# Patient Record
Sex: MALE | Race: WHITE | Employment: FULL TIME | URBAN - METROPOLITAN AREA
[De-identification: names, ages, dates, MRNs, and addresses within clinical notes are randomized per-mention and may not be internally consistent; named-entity substitution may affect disease eponyms.]

---

## 2021-05-24 ENCOUNTER — APPOINTMENT (OUTPATIENT)
Dept: GENERAL RADIOLOGY | Age: 56
End: 2021-05-24
Attending: EMERGENCY MEDICINE
Payer: COMMERCIAL

## 2021-05-24 ENCOUNTER — APPOINTMENT (OUTPATIENT)
Dept: NON INVASIVE DIAGNOSTICS | Age: 56
End: 2021-05-24
Attending: FAMILY MEDICINE
Payer: COMMERCIAL

## 2021-05-24 ENCOUNTER — HOSPITAL ENCOUNTER (OUTPATIENT)
Age: 56
Setting detail: OBSERVATION
Discharge: HOME | End: 2021-05-25
Attending: EMERGENCY MEDICINE | Admitting: FAMILY MEDICINE
Payer: COMMERCIAL

## 2021-05-24 DIAGNOSIS — R09.02 HYPOXIA: ICD-10-CM

## 2021-05-24 DIAGNOSIS — I50.21 ACUTE SYSTOLIC (CONGESTIVE) HEART FAILURE (HCC): Primary | ICD-10-CM

## 2021-05-24 PROBLEM — I50.9 CHF EXACERBATION (HCC): Status: ACTIVE | Noted: 2021-05-24

## 2021-05-24 LAB
ALBUMIN SERPL-MCNC: 3.2 G/DL (ref 3.5–5)
ALBUMIN/GLOB SERPL: 1 {RATIO} (ref 1.1–2.2)
ALP SERPL-CCNC: 66 U/L (ref 45–117)
ALT SERPL-CCNC: 35 U/L (ref 12–78)
ANION GAP SERPL CALC-SCNC: 3 MMOL/L (ref 5–15)
AST SERPL-CCNC: 28 U/L (ref 15–37)
BASOPHILS # BLD: 0.1 K/UL (ref 0–0.1)
BASOPHILS NFR BLD: 1 % (ref 0–1)
BILIRUB SERPL-MCNC: 0.7 MG/DL (ref 0.2–1)
BNP SERPL-MCNC: 1968 PG/ML
BUN SERPL-MCNC: 18 MG/DL (ref 6–20)
BUN/CREAT SERPL: 21 (ref 12–20)
CALCIUM SERPL-MCNC: 8.1 MG/DL (ref 8.5–10.1)
CHLORIDE SERPL-SCNC: 104 MMOL/L (ref 97–108)
CO2 SERPL-SCNC: 33 MMOL/L (ref 21–32)
COMMENT, HOLDF: NORMAL
CREAT SERPL-MCNC: 0.87 MG/DL (ref 0.7–1.3)
DIFFERENTIAL METHOD BLD: ABNORMAL
ECHO AO ROOT DIAM: 3.18 CM
ECHO AV AREA PEAK VELOCITY: 1.92 CM2
ECHO AV AREA/BSA PEAK VELOCITY: 1 CM2/M2
ECHO AV PEAK GRADIENT: 4.18 MMHG
ECHO AV PEAK VELOCITY: 102.27 CM/S
ECHO EST RA PRESSURE: 3 MMHG
ECHO LA AREA 4C: 15.49 CM2
ECHO LA MAJOR AXIS: 2.5 CM
ECHO LA MINOR AXIS: 1.33 CM
ECHO LA VOL 2C: 28.65 ML (ref 18–58)
ECHO LA VOL 4C: 43.53 ML (ref 18–58)
ECHO LA VOL BP: 37.93 ML (ref 18–58)
ECHO LA VOL/BSA BIPLANE: 20.18 ML/M2 (ref 16–28)
ECHO LA VOLUME INDEX A2C: 15.24 ML/M2 (ref 16–28)
ECHO LA VOLUME INDEX A4C: 23.15 ML/M2 (ref 16–28)
ECHO LV E' LATERAL VELOCITY: 7.74 CENTIMETER/SECOND
ECHO LV E' SEPTAL VELOCITY: 7.17 CENTIMETER/SECOND
ECHO LV INTERNAL DIMENSION DIASTOLIC: 3.75 CM (ref 4.2–5.9)
ECHO LV INTERNAL DIMENSION SYSTOLIC: 2.65 CM
ECHO LV IVSD: 0.95 CM (ref 0.6–1)
ECHO LV MASS 2D: 100.5 G (ref 88–224)
ECHO LV MASS INDEX 2D: 53.5 G/M2 (ref 49–115)
ECHO LV POSTERIOR WALL DIASTOLIC: 0.87 CM (ref 0.6–1)
ECHO LVOT DIAM: 1.76 CM
ECHO LVOT PEAK GRADIENT: 2.6 MMHG
ECHO LVOT PEAK VELOCITY: 80.67 CM/S
ECHO MV A VELOCITY: 64.11 CENTIMETER/SECOND
ECHO MV AREA PHT: 3.46 CM2
ECHO MV E DECELERATION TIME (DT): 219.51 MS
ECHO MV E VELOCITY: 44.68 CENTIMETER/SECOND
ECHO MV PRESSURE HALF TIME (PHT): 63.66 MS
ECHO PV PEAK INSTANTANEOUS GRADIENT SYSTOLIC: 1.35 MMHG
ECHO PV REGURGITANT MAX VELOCITY: 58.12 CM/S
ECHO RIGHT VENTRICULAR SYSTOLIC PRESSURE (RVSP): 31.65 MMHG
ECHO RV INTERNAL DIMENSION: 3.39 CM
ECHO RV TAPSE: 1.56 CM (ref 1.5–2)
ECHO TV REGURGITANT MAX VELOCITY: 267.62 CM/S
ECHO TV REGURGITANT PEAK GRADIENT: 28.65 MMHG
EOSINOPHIL # BLD: 0.3 K/UL (ref 0–0.4)
EOSINOPHIL NFR BLD: 4 % (ref 0–7)
ERYTHROCYTE [DISTWIDTH] IN BLOOD BY AUTOMATED COUNT: 13.5 % (ref 11.5–14.5)
GLOBULIN SER CALC-MCNC: 3.3 G/DL (ref 2–4)
GLUCOSE SERPL-MCNC: 138 MG/DL (ref 65–100)
HCT VFR BLD AUTO: 46.8 % (ref 36.6–50.3)
HGB BLD-MCNC: 15.2 G/DL (ref 12.1–17)
IMM GRANULOCYTES # BLD AUTO: 0 K/UL (ref 0–0.04)
IMM GRANULOCYTES NFR BLD AUTO: 0 % (ref 0–0.5)
LA VOL DISK BP: 35.18 ML (ref 18–58)
LYMPHOCYTES # BLD: 0.9 K/UL (ref 0.8–3.5)
LYMPHOCYTES NFR BLD: 12 % (ref 12–49)
MCH RBC QN AUTO: 30.8 PG (ref 26–34)
MCHC RBC AUTO-ENTMCNC: 32.5 G/DL (ref 30–36.5)
MCV RBC AUTO: 94.9 FL (ref 80–99)
MONOCYTES # BLD: 0.5 K/UL (ref 0–1)
MONOCYTES NFR BLD: 7 % (ref 5–13)
NEUTS SEG # BLD: 5.7 K/UL (ref 1.8–8)
NEUTS SEG NFR BLD: 76 % (ref 32–75)
NRBC # BLD: 0 K/UL (ref 0–0.01)
NRBC BLD-RTO: 0 PER 100 WBC
PLATELET # BLD AUTO: 285 K/UL (ref 150–400)
PMV BLD AUTO: 9.3 FL (ref 8.9–12.9)
POTASSIUM SERPL-SCNC: 4.4 MMOL/L (ref 3.5–5.1)
PROT SERPL-MCNC: 6.5 G/DL (ref 6.4–8.2)
RBC # BLD AUTO: 4.93 M/UL (ref 4.1–5.7)
SAMPLES BEING HELD,HOLD: NORMAL
SODIUM SERPL-SCNC: 140 MMOL/L (ref 136–145)
TROPONIN I SERPL-MCNC: <0.05 NG/ML
TROPONIN I SERPL-MCNC: <0.05 NG/ML
WBC # BLD AUTO: 7.4 K/UL (ref 4.1–11.1)

## 2021-05-24 PROCEDURE — 96376 TX/PRO/DX INJ SAME DRUG ADON: CPT

## 2021-05-24 PROCEDURE — 99222 1ST HOSP IP/OBS MODERATE 55: CPT | Performed by: SPECIALIST

## 2021-05-24 PROCEDURE — 65660000000 HC RM CCU STEPDOWN

## 2021-05-24 PROCEDURE — 85025 COMPLETE CBC W/AUTO DIFF WBC: CPT

## 2021-05-24 PROCEDURE — 99285 EMERGENCY DEPT VISIT HI MDM: CPT

## 2021-05-24 PROCEDURE — 84484 ASSAY OF TROPONIN QUANT: CPT

## 2021-05-24 PROCEDURE — 74011250637 HC RX REV CODE- 250/637: Performed by: EMERGENCY MEDICINE

## 2021-05-24 PROCEDURE — 99218 HC RM OBSERVATION: CPT

## 2021-05-24 PROCEDURE — 83880 ASSAY OF NATRIURETIC PEPTIDE: CPT

## 2021-05-24 PROCEDURE — 74011250636 HC RX REV CODE- 250/636: Performed by: EMERGENCY MEDICINE

## 2021-05-24 PROCEDURE — 71046 X-RAY EXAM CHEST 2 VIEWS: CPT

## 2021-05-24 PROCEDURE — 93306 TTE W/DOPPLER COMPLETE: CPT

## 2021-05-24 PROCEDURE — 74011250636 HC RX REV CODE- 250/636: Performed by: NURSE PRACTITIONER

## 2021-05-24 PROCEDURE — 74011250637 HC RX REV CODE- 250/637: Performed by: FAMILY MEDICINE

## 2021-05-24 PROCEDURE — 80053 COMPREHEN METABOLIC PANEL: CPT

## 2021-05-24 PROCEDURE — 93005 ELECTROCARDIOGRAM TRACING: CPT

## 2021-05-24 PROCEDURE — 96374 THER/PROPH/DIAG INJ IV PUSH: CPT

## 2021-05-24 PROCEDURE — 36415 COLL VENOUS BLD VENIPUNCTURE: CPT

## 2021-05-24 RX ORDER — FUROSEMIDE 10 MG/ML
40 INJECTION INTRAMUSCULAR; INTRAVENOUS 2 TIMES DAILY
Status: DISCONTINUED | OUTPATIENT
Start: 2021-05-24 | End: 2021-05-25 | Stop reason: HOSPADM

## 2021-05-24 RX ORDER — ASPIRIN 325 MG
325 TABLET ORAL
Status: COMPLETED | OUTPATIENT
Start: 2021-05-24 | End: 2021-05-24

## 2021-05-24 RX ORDER — ONDANSETRON 2 MG/ML
4 INJECTION INTRAMUSCULAR; INTRAVENOUS
Status: DISCONTINUED | OUTPATIENT
Start: 2021-05-24 | End: 2021-05-25 | Stop reason: HOSPADM

## 2021-05-24 RX ORDER — LISINOPRIL 5 MG/1
10 TABLET ORAL DAILY
Status: DISCONTINUED | OUTPATIENT
Start: 2021-05-25 | End: 2021-05-25 | Stop reason: HOSPADM

## 2021-05-24 RX ORDER — ACETAMINOPHEN 650 MG/1
650 SUPPOSITORY RECTAL
Status: DISCONTINUED | OUTPATIENT
Start: 2021-05-24 | End: 2021-05-25 | Stop reason: HOSPADM

## 2021-05-24 RX ORDER — CARVEDILOL 6.25 MG/1
25 TABLET ORAL 2 TIMES DAILY WITH MEALS
Status: DISCONTINUED | OUTPATIENT
Start: 2021-05-24 | End: 2021-05-25

## 2021-05-24 RX ORDER — SODIUM CHLORIDE 0.9 % (FLUSH) 0.9 %
5-40 SYRINGE (ML) INJECTION AS NEEDED
Status: DISCONTINUED | OUTPATIENT
Start: 2021-05-24 | End: 2021-05-25 | Stop reason: HOSPADM

## 2021-05-24 RX ORDER — PROMETHAZINE HYDROCHLORIDE 25 MG/1
12.5 TABLET ORAL
Status: DISCONTINUED | OUTPATIENT
Start: 2021-05-24 | End: 2021-05-25 | Stop reason: HOSPADM

## 2021-05-24 RX ORDER — FUROSEMIDE 10 MG/ML
40 INJECTION INTRAMUSCULAR; INTRAVENOUS DAILY
Status: DISCONTINUED | OUTPATIENT
Start: 2021-05-25 | End: 2021-05-24

## 2021-05-24 RX ORDER — FUROSEMIDE 10 MG/ML
40 INJECTION INTRAMUSCULAR; INTRAVENOUS
Status: COMPLETED | OUTPATIENT
Start: 2021-05-24 | End: 2021-05-24

## 2021-05-24 RX ORDER — CARVEDILOL 25 MG/1
25 TABLET ORAL 2 TIMES DAILY WITH MEALS
Status: ON HOLD | COMMUNITY
End: 2021-05-25 | Stop reason: SDUPTHER

## 2021-05-24 RX ORDER — ENOXAPARIN SODIUM 100 MG/ML
40 INJECTION SUBCUTANEOUS DAILY
Status: DISCONTINUED | OUTPATIENT
Start: 2021-05-25 | End: 2021-05-25 | Stop reason: HOSPADM

## 2021-05-24 RX ORDER — ERGOCALCIFEROL 1.25 MG/1
50000 CAPSULE ORAL
Status: DISCONTINUED | OUTPATIENT
Start: 2021-05-27 | End: 2021-05-25 | Stop reason: HOSPADM

## 2021-05-24 RX ORDER — ACETAMINOPHEN 325 MG/1
650 TABLET ORAL
Status: DISCONTINUED | OUTPATIENT
Start: 2021-05-24 | End: 2021-05-25 | Stop reason: HOSPADM

## 2021-05-24 RX ORDER — POLYETHYLENE GLYCOL 3350 17 G/17G
17 POWDER, FOR SOLUTION ORAL DAILY PRN
Status: DISCONTINUED | OUTPATIENT
Start: 2021-05-24 | End: 2021-05-25 | Stop reason: HOSPADM

## 2021-05-24 RX ORDER — LISINOPRIL 10 MG/1
10 TABLET ORAL DAILY
COMMUNITY
End: 2021-05-25

## 2021-05-24 RX ORDER — SODIUM CHLORIDE 0.9 % (FLUSH) 0.9 %
5-40 SYRINGE (ML) INJECTION EVERY 8 HOURS
Status: DISCONTINUED | OUTPATIENT
Start: 2021-05-24 | End: 2021-05-25 | Stop reason: HOSPADM

## 2021-05-24 RX ORDER — ERGOCALCIFEROL 1.25 MG/1
50000 CAPSULE ORAL
COMMUNITY
End: 2021-12-22

## 2021-05-24 RX ADMIN — FUROSEMIDE 40 MG: 10 INJECTION, SOLUTION INTRAMUSCULAR; INTRAVENOUS at 10:14

## 2021-05-24 RX ADMIN — FUROSEMIDE 40 MG: 10 INJECTION, SOLUTION INTRAMUSCULAR; INTRAVENOUS at 16:21

## 2021-05-24 RX ADMIN — Medication 10 ML: at 16:23

## 2021-05-24 RX ADMIN — NITROGLYCERIN 1 INCH: 20 OINTMENT TOPICAL at 09:00

## 2021-05-24 RX ADMIN — CARVEDILOL 25 MG: 6.25 TABLET, FILM COATED ORAL at 16:21

## 2021-05-24 RX ADMIN — ASPIRIN 325 MG ORAL TABLET 325 MG: 325 PILL ORAL at 08:59

## 2021-05-24 NOTE — PROGRESS NOTES
5/24/2021  11:40 AM  CM completed assessment w/ pt in person, CM wore mask and goggles at all times. Charted demographics verified, pt lives w/ spouse and 2 foster sons  Yaya Law (LINDA) [phone] in 2 story home, there are 2 entry steps and 13 interior steps to bed/bath. At baseline pt reports to be ambulatory, iADLS, drives, wife can assist.    Reason for Admission: Emergent for  Acute CHF exacerbation, Hypoxia  Pt is a 51 yo  male who presents to [facility] ED c/o Lt sided chest discomfort, fatigue  and SOB since Mid-April  PMHx: CHF                   RUR Score:    N/A at time of admission, Low Risk of Readmission                 Plan for utilizing home health:    No history, pt is not homebound  DME: None, pt does not have home O2    Rx: [employer], pt uses [address], reports no difficulty affording his medications   PCP: First and Last name:  Antonella Cortes MD     Name of Practice:    Are you a current patient: Yes/No: Yes   Approximate date of last visit: 45 days   Can you participate in a virtual visit with your PCP: YES                    Current Advanced Directive/Advance Care Plan: Full Code  HCDM spouse Yaya Law [phone]  Healthcare Decision Maker:   Click here to complete [employer] including selection of the Healthcare Decision Maker Relationship (ie \"Primary\")                             Transition of Care Plan:                    RUR N/A, pt is Low Risk of Readmission  1. Hospital admission for medical management   2. Cardiology consult  3. CM to follow through for treatment/response  4. ACP planning, pt declined to complete on this admission  5. DC when stable to home  6. Outpatient f/u PCP, cardiology  7. Wife Jada Murillo (C) [phone] to transport    Care Management Interventions  PCP Verified by CM:  Yes Shorty Brar MD, last visit 45 days)  Palliative Care Criteria Met (RRAT>21 & CHF Dx)?: No  Mode of Transport at Discharge: Self (spouse)  Physical Therapy Consult: No  Occupational Therapy Consult: No  Speech Therapy Consult: No  Current Support Network: Lives with Spouse, Own Home (pt lives w/ spouse in pvt residence, at baseline pt is ambulatory, iADLS, drives)  Confirm Follow Up Transport: Self  Discharge Location  Discharge Placement: Home with outpatient services  RUTHIE Marquez

## 2021-05-24 NOTE — PROGRESS NOTES
Records received from [address] [place] [address] Drive   Dr Akiko Chase  Echo 2016 ef 40-45%  Ef improved w GDMT  Echo 2017 EF 55%  Normal nuclear stress test  2017    Brother  from CHF in his 45s   Father AA s/p repair at age 75 yo

## 2021-05-24 NOTE — PROGRESS NOTES
1410 TRANSFER - IN REPORT:    Verbal report received from Royal Vargas RN (name) on Samantha Gaines  being received from ED 11 (unit) for routine progression of care      Report consisted of patients Situation, Background, Assessment and   Recommendations(SBAR). Information from the following report(s) SBAR, Kardex, ED Summary, Intake/Output, MAR, Recent Results and Cardiac Rhythm NSR was reviewed with the receiving nurse. Opportunity for questions and clarification was provided. Assessment completed upon patients arrival to unit and care assumed. 1525 Pt arrived to unit. Dual skin performed with Sarika Bird RN and WDL. Pt refused 2L O2 with O2 saturation of 88% on room air. Pt refused to wear hospital gown and performed CHG bath on own. Pt refused urinal, wants to get up to restroom and use hat.     1900 Bedside shift change report given to Braden Frye RN (oncoming nurse) by Micheline Wyatt RN (offgoing nurse). Report included the following information SBAR, Kardex, ED Summary, Intake/Output, MAR, Recent Results and Cardiac Rhythm NSR. This patient was assisted with Intentional Toileting every 2 hours during this shift. Documentation of ambulation and output reflected on Flowsheet.

## 2021-05-24 NOTE — CONSULTS
[facility] [address] [phone]  Mobile [phone]    Cardiology Consult Note    Date of  Admission: 2021  8:31 AM  PCP- MD Andrew Saucedo MD  :  1965   MRN:  [id number]       Reason for consult: CHF  Admission Diagnosis: CHF exacerbation ([facility] [I50.9]    Amber France is a 50 y.o. male admitted for CHF exacerbation ([facility] [I50.9]. Consult requested by Sharmaine Closs, MD       Subjective:   CHF exacerbation [facility]) [I50.9]        Impression Plan/Recommendation   1. Acute CHF  2. Pulmonary edema on xray   3. Hypertension   4. Hx of cardiomyopathy               · PTA ACE/coreg  · Echo, reported echo   · Agree w IV lasix, needs aggressive diuresis    · Records from [facility]  · D/c nitro paste  · Can increase lisinopril if needed   · Serial cardiac enzymes, 1st trop is negative/ ecg NSR non ischemic                Subjective:  Amber France is a 50 y.o. male I am seeing for CHF. Pmhx includes cardiomyopathy and  hypertension. Reports he has not been feeling well, \"wonky\" for the last month. C/o HERRERA/SOB, LE edema and fatigue. Denies chest pain, syncope, orthopnea. His brother had a idiopathic CM- he has been tested by a cardiologist is [facility] and he does not have this. EF 55% (2018), and reported negative stress test in 2016. He has been on a bb and acei for prophylaxis. Family hx: brother - idiopathic cardiomyopathy/CHF-  at age 45%. Social hx: denies tobacco or ETOH. No past medical history on file. No past surgical history on file.      Hospital Medications:  Current Facility-Administered Medications   Medication Dose Route Frequency    sodium chloride (NS) flush 5-40 mL  5-40 mL IntraVENous Q8H    sodium chloride (NS) flush 5-40 mL  5-40 mL IntraVENous PRN    acetaminophen (TYLENOL) tablet 650 mg  650 mg Oral Q6H PRN    Or    acetaminophen (TYLENOL) suppository 650 mg  650 mg Rectal Q6H PRN    polyethylene glycol (MIRALAX) packet 17 g  17 g Oral DAILY PRN    promethazine (PHENERGAN) tablet 12.5 mg  12.5 mg Oral Q6H PRN    Or    ondansetron (ZOFRAN) injection 4 mg  4 mg IntraVENous Q6H PRN    [START ON 5/25/2021] enoxaparin (LOVENOX) injection 40 mg  40 mg SubCUTAneous DAILY    [START ON 5/25/2021] furosemide (LASIX) injection 40 mg  40 mg IntraVENous DAILY    carvediloL (COREG) tablet 25 mg  25 mg Oral BID WITH MEALS    ergocalciferol capsule 50,000 Units  50,000 Units Oral Q7D    [START ON 5/25/2021] lisinopriL (PRINIVIL, ZESTRIL) tablet 10 mg  10 mg Oral DAILY     Current Outpatient Medications   Medication Sig    carvediloL (Coreg) 25 mg tablet Take 25 mg by mouth two (2) times daily (with meals).  lisinopriL (PRINIVIL, ZESTRIL) 10 mg tablet Take 10 mg by mouth daily.  ergocalciferol (ERGOCALCIFEROL) 1,250 mcg (50,000 unit) capsule Take 50,000 Units by mouth every seven (7) days. No current facility-administered medications on file prior to encounter. Current Outpatient Medications on File Prior to Encounter   Medication Sig Dispense Refill    carvediloL (Coreg) 25 mg tablet Take 25 mg by mouth two (2) times daily (with meals).  lisinopriL (PRINIVIL, ZESTRIL) 10 mg tablet Take 10 mg by mouth daily.  ergocalciferol (ERGOCALCIFEROL) 1,250 mcg (50,000 unit) capsule Take 50,000 Units by mouth every seven (7) days. No Known Allergies          Review of Symptoms:  All other systems reviewed are negative except as above. Physical Exam    Visit Vitals  BP (!) 144/113   Pulse 87   Temp 97.9 °F (36.6 °C)   Resp 26   Ht 5' 6\" (1.676 m)   Wt 173 lb 8 oz (78.7 kg)   SpO2 93%   BMI 28.00 kg/m²     General Appearance:  Well developed, well nourished,alert and oriented x 3, and individual in no acute distress.    Ears/Nose/Mouth/Throat:   Hearing grossly normal.Normal oral mucosa,no scleral icterus     Neck: Supple + JVD Chest:   Lungs -uday diminished/distant BS in bases   Cardiovascular:  Regular rate and rhythm, 2/6 Murmur. Abdomen:   Rounded/firm. Extremities: +1 edema bilaterally. Pulses detected, no varicosities   Skin: Warm and dry. No bruising  Neuro  Moves all extermities and neurologically intact                                                       Cardiographics    Telemetry: NSR  ECG: NSR    Cardiac testing      Recent Labs     05/24/21  0855   TROIQ <0.05       Recent Labs     05/24/21  0855      K 4.4   CO2 33*   BUN 18   CREA 0.87   *   WBC 7.4   HGB 15.2   HCT 46.8          I have discussed the diagnosis with the patient and the intended plan as seen in the above orders. Questions were answered concerning future plans. I have discussed medication side effects and warnings with the patient as well. Ramon Handley is in agreement to the plan listed above and wishes to proceed. he  was instructed not to smoke, eat heart healthy diet  and to exercise. Thank you for this consult.     Abebe Gould NP

## 2021-05-24 NOTE — H&P
[address] Adult  Hospitalist Group    History & Physical    Date of service: 2021    Patient name: Gael Castro  MRN: [id number]  YOB: 1965  Age: 50 y.o. Primary care provider:  Antonella Cortes MD     Source of Information: patient, medical records                                Chief complain: Shortness of breath    History of present illness  Gael Castro is a 50 y.o. male who presented with left sided chest discomfort since this morning and generally not feeling well. The chest pain is more pressure like and does not radiate. He has no other complaints. He has been having mild SOB on exertion for the last month or so, which he seems to believe is due to dehydration. He had an echo done in [facility] several years ago and reports and EF of 55% and had a negative stress test around that time as well. His brother  suddenly of idiopathic cardiomyopathy. He seems fed up with giving multiple providers the same story, states he doesn't like going to doctors or hospitals. No past medical history on file. No past surgical history on file. Prior to Admission medications    Medication Sig Start Date End Date Taking? Authorizing Provider   carvediloL (Coreg) 25 mg tablet Take 25 mg by mouth two (2) times daily (with meals). Yes Provider, Historical   lisinopriL (PRINIVIL, ZESTRIL) 10 mg tablet Take 10 mg by mouth daily. Yes Provider, Historical   ergocalciferol (ERGOCALCIFEROL) 1,250 mcg (50,000 unit) capsule Take 50,000 Units by mouth every seven (7) days. Yes Provider, Historical     No Known Allergies   No family history on file. Social history    Social History     Tobacco Use   Smoking Status Not on file       Social History     Substance and Sexual Activity   Alcohol Use Not on file       Code status  Code status discussed with the patient/caregivers.   Full Code    Review of systems    A comprehensive review of systems was negative except for that written in the History of Present Illness. Physical Examination   Visit Vitals  BP (!) 144/113   Pulse 87   Temp 97.9 °F (36.6 °C)   Resp 26   Ht 5' 6\" (1.676 m)   Wt 78.7 kg (173 lb 8 oz)   SpO2 93%   BMI 28.00 kg/m²      O2 Flow Rate (L/min): 2 l/min   O2 Device: None (Room air)    General:  Alert, cooperative, no distress   Head:  Normocephalic, without obvious abnormality, atraumatic   Eyes:  Conjunctivae/corneas clear. PERRL, EOMs intact   Head/Neck: Nares normal. No nasal drainage or sinus tenderness  Lips, mucosa, and tongue normal   Teeth and gums normal  Clear oropharynx  Trachea midline  No palpable adenopathy  No thyroid enlargement, tenderness     Lungs:   Symmetrical chest expansion and respiratory effort  Clear to auscultation bilaterally       Heart:  Regular rhythm   Sounds normal; no murmur, rub or gallop   Abdomen:   Soft, no tenderness  Bowel sounds normal  No masses or hepatosplenomegaly     Back: No CVA tenderness   Extremities: Extremities normal, atraumatic  No cyanosis or edema     Skin: No rashes or ulcers   Musculo-      skeletal: Gait not tested  Normal symmetry, ROM, strength and tone   Neuro: Cranial nerves grossly normal     Psych: Alert, oriented x3  Normal affect, judgement and insight     Data Review    24 Hour Results:  Recent Results (from the past 24 hour(s))   CBC WITH AUTOMATED DIFF    Collection Time: 05/24/21  8:55 AM   Result Value Ref Range    WBC 7.4 4.1 - 11.1 K/uL    RBC 4.93 4.10 - 5.70 M/uL    HGB 15.2 12.1 - 17.0 g/dL    HCT 46.8 36.6 - 50.3 %    MCV 94.9 80.0 - 99.0 FL    MCH 30.8 26.0 - 34.0 PG    MCHC 32.5 30.0 - 36.5 g/dL    RDW 13.5 11.5 - 14.5 %    PLATELET 424 574 - 344 K/uL    MPV 9.3 8.9 - 12.9 FL    NRBC 0.0 0  WBC    ABSOLUTE NRBC 0.00 0.00 - 0.01 K/uL    NEUTROPHILS 76 (H) 32 - 75 %    LYMPHOCYTES 12 12 - 49 %    MONOCYTES 7 5 - 13 %    EOSINOPHILS 4 0 - 7 %    BASOPHILS 1 0 - 1 %    IMMATURE GRANULOCYTES 0 0.0 - 0.5 %    ABS.  NEUTROPHILS 5.7 1.8 - 8.0 K/UL    ABS. LYMPHOCYTES 0.9 0.8 - 3.5 K/UL    ABS. MONOCYTES 0.5 0.0 - 1.0 K/UL    ABS. EOSINOPHILS 0.3 0.0 - 0.4 K/UL    ABS. BASOPHILS 0.1 0.0 - 0.1 K/UL    ABS. IMM. GRANS. 0.0 0.00 - 0.04 K/UL    DF AUTOMATED     METABOLIC PANEL, COMPREHENSIVE    Collection Time: 05/24/21  8:55 AM   Result Value Ref Range    Sodium 140 136 - 145 mmol/L    Potassium 4.4 3.5 - 5.1 mmol/L    Chloride 104 97 - 108 mmol/L    CO2 33 (H) 21 - 32 mmol/L    Anion gap 3 (L) 5 - 15 mmol/L    Glucose 138 (H) 65 - 100 mg/dL    BUN 18 6 - 20 MG/DL    Creatinine 0.87 0.70 - 1.30 MG/DL    BUN/Creatinine ratio 21 (H) 12 - 20      GFR est AA >60 >60 ml/min/1.73m2    GFR est non-AA >60 >60 ml/min/1.73m2    Calcium 8.1 (L) 8.5 - 10.1 MG/DL    Bilirubin, total 0.7 0.2 - 1.0 MG/DL    ALT (SGPT) 35 12 - 78 U/L    AST (SGOT) 28 15 - 37 U/L    Alk. phosphatase 66 45 - 117 U/L    Protein, total 6.5 6.4 - 8.2 g/dL    Albumin 3.2 (L) 3.5 - 5.0 g/dL    Globulin 3.3 2.0 - 4.0 g/dL    A-G Ratio 1.0 (L) 1.1 - 2.2     TROPONIN I    Collection Time: 05/24/21  8:55 AM   Result Value Ref Range    Troponin-I, Qt. <0.05 <0.05 ng/mL   NT-PRO BNP    Collection Time: 05/24/21  8:55 AM   Result Value Ref Range    NT pro-BNP 1,968 (H) <125 PG/ML   SAMPLES BEING HELD    Collection Time: 05/24/21  8:55 AM   Result Value Ref Range    SAMPLES BEING HELD 1RED,1SST     COMMENT        Add-on orders for these samples will be processed based on acceptable specimen integrity and analyte stability, which may vary by analyte. Recent Labs     05/24/21  0855   WBC 7.4   HGB 15.2   HCT 46.8        Recent Labs     05/24/21  0855      K 4.4      CO2 33*   *   BUN 18   CREA 0.87   CA 8.1*   ALB 3.2*   TBILI 0.7   ALT 35       Imaging  XR CHEST PA LAT    Result Date: 5/24/2021  Exam:  2 view chest Indication: Chest pain PA and lateral views demonstrate normal heart size.  There are small bilateral pleural effusions with underlying atelectasis and mild edema. Dextroconvex scoliosis of the thoracic spine is noted with stabilization tania. Small bilateral pleural effusions with underlying atelectasis and mild edema. Assessment and Plan     Acute on chronic CHF exacerbation  -Received a dose of Lasix in the ED  -Mildly hypoxic, requiring 2 L but patient refusing to keep it on  -Check echo and continue daily furosemide, ACE, beta-blocker  -Consult cardiology    Acute hypoxic respiratory failure  -Due to above  -Wean as tolerated to maintain sats above 90. Chest pain  -Trend enzymes  -Cardiology consulted as above    Hypertension  -Continue ACE and beta-blocker    Diet: Cardiac  Activity: As tolerated  DVT prophylaxis: Lovenox  Isolation precautions: None       Signed by:  oJdy Cook MD    May 24, 2021 at 11:08 AM

## 2021-05-24 NOTE — ED NOTES
TRANSFER - OUT REPORT:    Verbal report given to ZACKARY Roca(name) on Sotero Castro  being transferred to 3rd floor(unit) for routine progression of care       Report consisted of patients Situation, Background, Assessment and   Recommendations(SBAR). Information from the following report(s) SBAR, ED Summary, Intake/Output, MAR, Recent Results and Cardiac Rhythm NSR was reviewed with the receiving nurse. Lines:   Peripheral IV 05/24/21 Right Wrist (Active)   Site Assessment Clean, dry, & intact 05/24/21 0853   Phlebitis Assessment 0 05/24/21 0853   Infiltration Assessment 0 05/24/21 0853   Dressing Status Clean, dry, & intact 05/24/21 0853   Dressing Type Transparent 05/24/21 0853   Hub Color/Line Status Pink;Flushed 05/24/21 0853   Action Taken Blood drawn 05/24/21 0362        Opportunity for questions and clarification was provided.       Patient transported with:   Monitor  Registered Nurse   Visit Vitals  BP (!) 121/94   Pulse 92   Temp 98.2 °F (36.8 °C)   Resp 23   Ht 5' 6\" (1.676 m)   Wt 78.5 kg (173 lb)   SpO2 95%   BMI 27.92 kg/m²

## 2021-05-24 NOTE — ED PROVIDER NOTES
Mr. Silvio Ratliff is a 52yo male who presents to the ER with complaints \"I want a feeling in my chest.\"  He said that the symptoms started when he got up at 6:00 this morning. His symptoms have been present but have been improving the morning. He describes it as a tightness in left side of his chest.  He said that it radiated minimally to his right lateral chest.  No radiation to his neck or his arms. He said that he has a history of baseline shortness of breath. He was diagnosed with a cardiomyopathy. He said that his last EF was 55%. He said that over the last month or so, he is getting more short of breath when he exerts himself. He has spoken to his primary care doctor about this and he has seen GI. He denies any lightheadedness or dizziness today. No nausea or vomiting. He denies any other complaints. No past medical history on file. No past surgical history on file. No family history on file. Social History     Socioeconomic History    Marital status: Not on file     Spouse name: Not on file    Number of children: Not on file    Years of education: Not on file    Highest education level: Not on file   Occupational History    Not on file   Tobacco Use    Smoking status: Not on file   Substance and Sexual Activity    Alcohol use: Not on file    Drug use: Not on file    Sexual activity: Not on file   Other Topics Concern    Not on file   Social History Narrative    Not on file     Social Determinants of Health     Financial Resource Strain:     Difficulty of Paying Living Expenses:    Food Insecurity:     Worried About Running Out of Food in the Last Year:     [address] in the Last Year:    Transportation Needs:     Lack of Transportation (Medical):      Lack of Transportation (Non-Medical):    Physical Activity:     Days of Exercise per Week:     Minutes of Exercise per Session:    Stress:     Feeling of Stress :    Social Connections:     Frequency of Communication with Friends and Family:     Frequency of Social Gatherings with Friends and Family:     Attends Religion Services:     Active Member of Clubs or Organizations:     Attends Club or Organization Meetings:     Marital Status:    Intimate Partner Violence:     Fear of Current or Ex-Partner:     Emotionally Abused:     Physically Abused:     Sexually Abused: ALLERGIES: Patient has no known allergies. Review of Systems   Constitutional: Negative for chills and fever. HENT: Negative for rhinorrhea and sore throat. Respiratory: Positive for chest tightness. Negative for cough and shortness of breath. Cardiovascular: Negative for chest pain. Gastrointestinal: Negative for abdominal pain, diarrhea, nausea and vomiting. Genitourinary: Negative for dysuria and hematuria. Musculoskeletal: Negative for arthralgias and myalgias. Skin: Negative for pallor and rash. Neurological: Negative for dizziness, weakness and light-headedness. All other systems reviewed and are negative. Vitals:    05/24/21 0826 05/24/21 0847   BP: (!) 143/106    Pulse: 88    Resp: 18    Temp: 97.9 °F (36.6 °C)    SpO2: 93% 93%   Weight: 78.7 kg (173 lb 8 oz)    Height: 5' 6\" (1.676 m)             Physical Exam     Vital signs reviewed. Nursing notes reviewed.     Const:  No acute distress, well developed, well nourished  Head:  Atraumatic, normocephalic  Eyes:  PERRL, conjunctiva normal, no scleral icterus  Neck:  Supple, trachea midline  Cardiovascular: Regular rate  Resp:  No resp distress, no increased work of breathing  Abd:  Soft, non-tender, non-distended  MSK:  No pedal edema, normal ROM  Neuro:  Alert and oriented x3, no cranial nerve defect  Skin:  Warm, dry, intact  Psych: normal mood and affect, behavior is normal, judgement and thought content is normal          MDM  Number of Diagnoses or Management Options     Amount and/or Complexity of Data Reviewed  Clinical lab tests: ordered and reviewed  Tests in the radiology section of CPT®: ordered and reviewed  Review and summarize past medical records: yes    Patient Progress  Patient progress: stable          Total critical care time spent exclusive of procedures:  35 min. Perfect Serve Consult for Admission  10:15 AM    ED Room Number: NK31/63  Patient Name and age:  Ghada Lab 50 y.o.  male  Working Diagnosis:   1. Acute systolic (congestive) heart failure ([address]    2. Hypoxia        COVID-19 Suspicion:  no  Sepsis present:  no  Reassessment needed: no  Code Status:  Full Code  Readmission: no  Isolation Requirements:  no  Recommended Level of Care:  telemetry  Department:[facility] ED - [phone]      Mr. Alyssia Tovar is a 52yo male who presents to the ER with chest pain and HERRERA. He states that his SOB has been worsening over the last few weeks. Xray shows edema and effusions. He is hypoxic in the ER, and he was placed on 2L NC. Pt.  To be evaluated for admission by the hospitalist.        Procedures

## 2021-05-24 NOTE — ED NOTES
Patient is asleep. O2 saturation dropped to 89%. Patient is placed on 2L of O2 via nasal canula. Dr. Abad Many notified.

## 2021-05-24 NOTE — ED TRIAGE NOTES
Patient arrives with complaint of left sided chest \"discomfort\", fatigue, intermittent SOB. States chest discomfort began this morning. Fatigue and SOB present since mid-April. Denies cough, fever, N/V/D. Hx of cardiomyopathy.

## 2021-05-24 NOTE — PROGRESS NOTES
[facility]: MED RECONCILIATION      Information obtained from: Patient in ER 11      Allergies: Patient has no known allergies. Prior to Admission Medications:     Medication Documentation Review Audit       Reviewed by SAVAGE FreyD (Pharmacist) on 05/24/21 at 1344      Medication Sig Documenting Provider Last Dose Status Taking?   carvediloL (Coreg) 25 mg tablet Take 25 mg by mouth two (2) times daily (with meals). Provider, Historical 5/24/2021 AM Active Yes   ergocalciferol (ERGOCALCIFEROL) 1,250 mcg (50,000 unit) capsule Take 50,000 Units by mouth every seven (7) days. Provider, Historical 5/20/2021 Active Yes   lisinopriL (PRINIVIL, ZESTRIL) 10 mg tablet Take 10 mg by mouth daily.  Provider, Historical 5/24/2021 AM Active Yes                      [facility], PHARMD   Contact: [phone]

## 2021-05-24 NOTE — PROGRESS NOTES
1900 Shift change:     Bedside and Verbal shift change report given to [address] [place] [address] (oncoming nurse) by Tushar Chaidez (offgoing nurse). Report included the following information SBAR, Kardex, Intake/Output, MAR, and Recent Results. Shift Summary: This patient was assisted with Intentional Toileting every 2 hours during this shift. Documentation of ambulation and output reflected on Flowsheet. 0730 Shift change:     Bedside and Verbal shift change report given to Emily Figueroa (oncoming nurse) by [address] [place] [address] (offgoing nurse). Report included the following information SBAR, Kardex, Intake/Output, MAR, and Recent Results.

## 2021-05-25 ENCOUNTER — APPOINTMENT (OUTPATIENT)
Dept: GENERAL RADIOLOGY | Age: 56
End: 2021-05-25
Attending: FAMILY MEDICINE
Payer: COMMERCIAL

## 2021-05-25 ENCOUNTER — APPOINTMENT (OUTPATIENT)
Dept: ULTRASOUND IMAGING | Age: 56
End: 2021-05-25
Attending: NURSE PRACTITIONER
Payer: COMMERCIAL

## 2021-05-25 VITALS
OXYGEN SATURATION: 93 % | HEART RATE: 99 BPM | DIASTOLIC BLOOD PRESSURE: 76 MMHG | RESPIRATION RATE: 18 BRPM | SYSTOLIC BLOOD PRESSURE: 103 MMHG | WEIGHT: 173 LBS | TEMPERATURE: 97.9 F | BODY MASS INDEX: 27.8 KG/M2 | HEIGHT: 66 IN

## 2021-05-25 LAB
ANION GAP SERPL CALC-SCNC: 4 MMOL/L (ref 5–15)
ATRIAL RATE: 82 BPM
BNP SERPL-MCNC: 1393 PG/ML
BUN SERPL-MCNC: 23 MG/DL (ref 6–20)
BUN/CREAT SERPL: 20 (ref 12–20)
CALCIUM SERPL-MCNC: 7.8 MG/DL (ref 8.5–10.1)
CALCULATED P AXIS, ECG09: 39 DEGREES
CALCULATED R AXIS, ECG10: 82 DEGREES
CALCULATED T AXIS, ECG11: 24 DEGREES
CHLORIDE SERPL-SCNC: 96 MMOL/L (ref 97–108)
CO2 SERPL-SCNC: 39 MMOL/L (ref 21–32)
CREAT SERPL-MCNC: 1.14 MG/DL (ref 0.7–1.3)
DIAGNOSIS, 93000: NORMAL
ERYTHROCYTE [DISTWIDTH] IN BLOOD BY AUTOMATED COUNT: 13.3 % (ref 11.5–14.5)
GLUCOSE SERPL-MCNC: 104 MG/DL (ref 65–100)
HCT VFR BLD AUTO: 48.2 % (ref 36.6–50.3)
HGB BLD-MCNC: 15.5 G/DL (ref 12.1–17)
MCH RBC QN AUTO: 31.1 PG (ref 26–34)
MCHC RBC AUTO-ENTMCNC: 32.2 G/DL (ref 30–36.5)
MCV RBC AUTO: 96.6 FL (ref 80–99)
NRBC # BLD: 0 K/UL (ref 0–0.01)
NRBC BLD-RTO: 0 PER 100 WBC
P-R INTERVAL, ECG05: 202 MS
PLATELET # BLD AUTO: 280 K/UL (ref 150–400)
PMV BLD AUTO: 9.7 FL (ref 8.9–12.9)
POTASSIUM SERPL-SCNC: 3.6 MMOL/L (ref 3.5–5.1)
Q-T INTERVAL, ECG07: 372 MS
QRS DURATION, ECG06: 80 MS
QTC CALCULATION (BEZET), ECG08: 434 MS
RBC # BLD AUTO: 4.99 M/UL (ref 4.1–5.7)
SODIUM SERPL-SCNC: 139 MMOL/L (ref 136–145)
TROPONIN I SERPL-MCNC: <0.05 NG/ML
VENTRICULAR RATE, ECG03: 82 BPM
WBC # BLD AUTO: 8.8 K/UL (ref 4.1–11.1)

## 2021-05-25 PROCEDURE — 96375 TX/PRO/DX INJ NEW DRUG ADDON: CPT

## 2021-05-25 PROCEDURE — 76705 ECHO EXAM OF ABDOMEN: CPT

## 2021-05-25 PROCEDURE — 71046 X-RAY EXAM CHEST 2 VIEWS: CPT

## 2021-05-25 PROCEDURE — 36415 COLL VENOUS BLD VENIPUNCTURE: CPT

## 2021-05-25 PROCEDURE — 83880 ASSAY OF NATRIURETIC PEPTIDE: CPT

## 2021-05-25 PROCEDURE — 74011250637 HC RX REV CODE- 250/637: Performed by: NURSE PRACTITIONER

## 2021-05-25 PROCEDURE — 74011250636 HC RX REV CODE- 250/636: Performed by: SPECIALIST

## 2021-05-25 PROCEDURE — 99232 SBSQ HOSP IP/OBS MODERATE 35: CPT | Performed by: SPECIALIST

## 2021-05-25 PROCEDURE — 80048 BASIC METABOLIC PNL TOTAL CA: CPT

## 2021-05-25 PROCEDURE — 96372 THER/PROPH/DIAG INJ SC/IM: CPT

## 2021-05-25 PROCEDURE — 99218 HC RM OBSERVATION: CPT

## 2021-05-25 PROCEDURE — 74011250636 HC RX REV CODE- 250/636: Performed by: FAMILY MEDICINE

## 2021-05-25 PROCEDURE — 85027 COMPLETE CBC AUTOMATED: CPT

## 2021-05-25 PROCEDURE — 84484 ASSAY OF TROPONIN QUANT: CPT

## 2021-05-25 RX ORDER — DOBUTAMINE HYDROCHLORIDE 200 MG/100ML
2.5 INJECTION INTRAVENOUS CONTINUOUS
Status: DISCONTINUED | OUTPATIENT
Start: 2021-05-25 | End: 2021-05-25

## 2021-05-25 RX ORDER — CARVEDILOL 3.12 MG/1
25 TABLET ORAL 2 TIMES DAILY WITH MEALS
Qty: 60 TABLET | Refills: 0 | Status: SHIPPED | OUTPATIENT
Start: 2021-05-25 | End: 2021-12-22

## 2021-05-25 RX ORDER — POTASSIUM CHLORIDE 750 MG/1
40 TABLET, FILM COATED, EXTENDED RELEASE ORAL
Status: COMPLETED | OUTPATIENT
Start: 2021-05-25 | End: 2021-05-25

## 2021-05-25 RX ORDER — CARVEDILOL 6.25 MG/1
12.5 TABLET ORAL 2 TIMES DAILY WITH MEALS
Status: DISCONTINUED | OUTPATIENT
Start: 2021-05-25 | End: 2021-05-25 | Stop reason: HOSPADM

## 2021-05-25 RX ADMIN — DOBUTAMINE HYDROCHLORIDE 2.5 MCG/KG/MIN: 200 INJECTION INTRAVENOUS at 05:51

## 2021-05-25 RX ADMIN — POTASSIUM CHLORIDE 40 MEQ: 750 TABLET, EXTENDED RELEASE ORAL at 15:22

## 2021-05-25 RX ADMIN — ENOXAPARIN SODIUM 40 MG: 40 INJECTION SUBCUTANEOUS at 07:54

## 2021-05-25 RX ADMIN — SODIUM CHLORIDE 250 ML: 9 INJECTION, SOLUTION INTRAVENOUS at 07:36

## 2021-05-25 RX ADMIN — Medication 10 ML: at 05:51

## 2021-05-25 RX ADMIN — Medication 10 ML: at 15:22

## 2021-05-25 NOTE — DISCHARGE INSTRUCTIONS
Patient Education          Resume COREG tomorrow morning at low dose- hold on starting if systolic blood pressure (top number) is less than 110. Monitor BP daily and call office for further instructions if blood pressure is greater than 140 consistently      Avoiding Triggers With Heart Failure: Care Instructions  Your Care Instructions     Triggers are anything that make your heart failure flare up. A flare-up is also called \"sudden heart failure\" or \"acute heart failure. \" When you have a flare-up, fluid builds up in your lungs, and you have problems breathing. You might need to go to the hospital. By watching for changes in your condition and avoiding triggers, you can prevent heart failure flare-ups. Follow-up care is a key part of your treatment and safety. Be sure to make and go to all appointments, and call your doctor if you are having problems. It's also a good idea to know your test results and keep a list of the medicines you take. How can you care for yourself at home? Watch for changes in your weight and condition  · Weigh yourself without clothing at the same time each day. Record your weight. Call your doctor if you have sudden weight gain, such as more than 2 to 3 pounds in a day or 5 pounds in a week. (Your doctor may suggest a different range of weight gain.) A sudden weight gain may mean that your heart failure is getting worse. · Keep a daily record of your symptoms. Write down any changes in how you feel, such as new shortness of breath, cough, or problems eating. Also record if your ankles are more swollen than usual and if you feel more tired than usual. Note anything that you ate or did that could have triggered these changes. Limit sodium  Sodium causes your body to hold on to extra water. This may cause your heart failure symptoms to get worse. People get most of their sodium from processed foods. Fast food and restaurant meals also tend to be very high in sodium.   · Your doctor may suggest that you limit sodium. Your doctor can tell you how much sodium is right for you. This includes limiting sodium in cooked and packaged foods. · Read food labels on cans and food packages. They tell you how much sodium you get in one serving. Check the serving size. If you eat more than one serving, you are getting more sodium. · Be aware that sodium can come in forms other than salt, including monosodium glutamate (MSG), sodium citrate, and sodium bicarbonate (baking soda). MSG is often added to Asian food. You can sometimes ask for food without MSG or salt. · Slowly reducing salt will help you adjust to the taste. Take the salt shaker off the table. · Flavor your food with garlic, lemon juice, onion, vinegar, herbs, and spices instead of salt. Do not use soy sauce, steak sauce, onion salt, garlic salt, mustard, or ketchup on your food, unless it is labeled \"low-sodium\" or \"low-salt. \"  · Make your own salad dressings, sauces, and ketchup without adding salt. · Use fresh or frozen ingredients, instead of canned ones, whenever you can. Choose low-sodium canned goods. · Eat less processed food and food from restaurants, including fast food. Exercise as directed  Moderate, regular exercise is very good for your heart. It improves your blood flow and helps control your weight. But too much exercise can stress your heart and cause a heart failure flare-up. · Check with your doctor before you start an exercise program.  · Walking is an easy way to get exercise. Start out slowly. Gradually increase the length and pace of your walk. Swimming, riding a bike, and using a treadmill are also good forms of exercise. · When you exercise, watch for signs that your heart is working too hard. You are pushing yourself too hard if you cannot talk while you are exercising. If you become short of breath or dizzy or have chest pain, stop, sit down, and rest.  · Do not exercise when you do not feel well.   Take medicines correctly  · Take your medicines exactly as prescribed. Call your doctor if you think you are having a problem with your medicine. · Make a list of all the medicines you take. Include those prescribed to you by other doctors and any over-the-counter medicines, vitamins, or supplements you take. Take this list with you when you go to any doctor. · Take your medicines at the same time every day. It may help you to post a list of all the medicines you take every day and what time of day you take them. · Make taking your medicine as simple as you can. Plan times to take your medicines when you are doing other things, such as eating a meal or getting ready for bed. This will make it easier to remember to take your medicines. · Get organized. Use helpful tools, such as daily or weekly pill containers. When should you call for help? Call 911 if you have symptoms of sudden heart failure such as:    · You have severe trouble breathing.     · You cough up pink, foamy mucus.     · You have a new irregular or rapid heartbeat. Call your doctor now or seek immediate medical care if:    · You have new or increased shortness of breath.     · You are dizzy or lightheaded, or you feel like you may faint.     · You have sudden weight gain, such as more than 2 to 3 pounds in a day or 5 pounds in a week. (Your doctor may suggest a different range of weight gain.)     · You have increased swelling in your legs, ankles, or feet.     · You are suddenly so tired or weak that you cannot do your usual activities. Watch closely for changes in your health, and be sure to contact your doctor if you develop new symptoms. Where can you learn more? Go to http://www.gray.com/  Enter V089 in the search box to learn more about \"Avoiding Triggers With Heart Failure: Care Instructions. \"  Current as of: August 31, 2020               Content Version: 12.8  © 2764-4700 [employer], [employer]    Care instructions adapted under license by [employer] (which disclaims liability or warranty for this information). If you have questions about a medical condition or this instruction, always ask your healthcare professional. [address] any warranty or liability for your use of this information. [facility]/ED Visit Follow-Up Instructions/Information    You may have an in home follow up visit set up with [facility] or may wish to contact [facility] to set-up a visit:    What are we? [facility] is an in-home urgent care service staffed with emergency trained medical teams. We come to your home in a vehicle stocked with medical supplies and technology. An ER physician is always available if needed. When? As a part of your hospital follow-up, an appointment has been/ or can be set up for us to come see you. Usually, this will be 24-72 hours after you leave the hospital or as needed. [facility] is open 7am-9pm, 7 days a week, 365 days a year, including holidays. Why? We know that you cannot always get to your doctor after being in the hospital and that your doctor is not always available when you need them. Once your workup is complete, we'll call in your prescriptions, update your family doctor, and handle billing with your insurance so you can focus on feeling better, faster without leaving home. How much? We accept most major health insurance plans, including Medicaid, Medicare, and Medicare Advantage [facility], [employer], and [employer]. We also accept: credit, debit, health savings account (HSA), health reimbursement account (HRA) and flexible spending account (FSA) payments. [facility]'s prices compare to conventional urgent care facilities, but we bring the care to you. How to reach us? Getting care is easy- use our mobile nessa (Inter[employer]), website ([email or web address]) or call us [phone].

## 2021-05-25 NOTE — PROGRESS NOTES
Provided pastoral care visit to [facility] 5 patient. Did not include sacramental care.     Haider Ennis

## 2021-05-25 NOTE — PROGRESS NOTES
Daily Progress Note: 2021  Patricia Lagos MD    Assessment/Plan:   Acute on chronic CHF exacerbation  -Received a dose of Lasix in the ED  -Echo with EF 50%  -Hold furosemide and  ACE due to hypotension   -beta-blocker with parameters  -Consult cardiology     Acute hypoxic respiratory failure  -Due to above  -Wean as tolerated to maintain sats above 90.     Chest pain  -Trend enzymes  -Cardiology consulted as above     Hypertension now hypotensive  -Hold ACE   -beta-blocker with parameters          Problem List:  Problem List as of 2021 Never Reviewed        Codes Class Noted - Resolved    CHF exacerbation ([facility] [address] ICD-10-CM: I50.9  ICD-9-CM: 428.0  2021 - Present              HPI:   Nii Brown is a 50 y.o. male who presented with left sided chest discomfort since this morning and generally not feeling well. The chest pain is more pressure like and does not radiate. He has no other complaints. He has been having mild SOB on exertion for the last month or so, which he seems to believe is due to dehydration. He had an echo done in [facility] several years ago and reports and EF of 55% and had a negative stress test around that time as well. His brother  suddenly of idiopathic cardiomyopathy. He seems fed up with giving multiple providers the same story, states he doesn't like going to doctors or hospitals. (Dr Matty Alexander)    :BP has been low. He is sitting in chair and is asymptomatic. Was on a Dobutamine drip for a short period of time during the night but d/c as his EF was 50% on ECHO. Receiving bolus. Cards following. Review of Systems:   A comprehensive review of systems was negative except for that written in the HPI.     Objective:   Physical Exam:     Visit Vitals  BP (!) 82/60   Pulse 69   Temp 97.5 °F (36.4 °C)   Resp 20   Ht 5' 6\" (1.676 m)   Wt 173 lb (78.5 kg)   SpO2 98%   BMI 27.92 kg/m²    O2 Flow Rate (L/min): 2 l/min O2 Device: Nasal cannula    Temp (24hrs), Av.8 °F (36.6 °C), Min:97.5 °F (36.4 °C), Max:98.2 °F (36.8 °C)    No intake/output data recorded. 1901 -  0700  In: 240 [P.O.:240]  Out: 3000 [Urine:3000]    General:  Alert, cooperative, no distress, appears stated age. Head:  Normocephalic, without obvious abnormality, atraumatic. Eyes:  Conjunctivae/corneas clear. PERRL, EOMs intact. Nose: Nares normal. Septum midline. Mucosa normal. No drainage or sinus tenderness. Throat: Lips, mucosa, and tongue normal. Teeth and gums normal.   Neck: Supple, symmetrical, trachea midline, no adenopathy, thyroid: no enlargement/tenderness/nodules, no carotid bruit and no JVD. Back:   Symmetric, no curvature. ROM normal. No CVA tenderness. Lungs:   Clear to auscultation bilaterally. Chest wall:  No tenderness or deformity. Heart:  Regular rate and rhythm, S1, S2 normal, no murmur, click, rub or gallop. Abdomen:   Soft, non-tender. Bowel sounds normal. No masses,  No organomegaly. Extremities: Lower extremities with 2+ edema. No calf tenderness or cords. Pulses: 2+ and symmetric all extremities. Skin: Skin color, texture, turgor normal. No rashes or lesions   Neurologic: CNII-XII intact. Alert and oriented X 3. Fine motor of hands and fingers normal.   equal.  No cogwheeling or rigidity. Gait not tested at this time. Sensation grossly normal to touch. Gross motor of extremities normal.       Data Review:   CXR 21  IMPRESSION  Small bilateral pleural effusions with underlying atelectasis and  mild edema. Interpretation Summary- ECHO 21    · LV: Calculated LVEF is 50%. Normal cavity size and wall thickness. Low normal systolic function. · RV: Borderline low systolic function. Comparison Study Information    Prior Study    There is no prior study available for comparison   Echo Findings    Left Ventricle Normal cavity size and wall thickness. The calculated EF is 50%. Low normal systolic function.    Left Atrium Normal cavity size. Right Ventricle Normal cavity size. Borderline low systolic function. Right Atrium Normal cavity size. Aortic Valve Trileaflet valve structure and no stenosis. Mitral Valve Normal valve structure and no regurgitation. Tricuspid Valve Normal valve structure. Trace regurgitation. Pulmonic Valve Pulmonic valve not well visualized. Trace regurgitation. Aorta Normal aortic root. Pericardium No evidence of pericardial effusion. Recent Days:  Recent Labs     05/25/21  0339 05/24/21  0855   WBC 8.8 7.4   HGB 15.5 15.2   HCT 48.2 46.8    285     Recent Labs     05/25/21  0339 05/24/21  0855    140   K 3.6 4.4   CL 96* 104   CO2 39* 33*   * 138*   BUN 23* 18   CREA 1.14 0.87   CA 7.8* 8.1*   ALB  --  3.2*   TBILI  --  0.7   ALT  --  35     No results for input(s): PH, PCO2, PO2, HCO3, FIO2 in the last 72 hours. 24 Hour Results:  Recent Results (from the past 24 hour(s))   CBC WITH AUTOMATED DIFF    Collection Time: 05/24/21  8:55 AM   Result Value Ref Range    WBC 7.4 4.1 - 11.1 K/uL    RBC 4.93 4.10 - 5.70 M/uL    HGB 15.2 12.1 - 17.0 g/dL    HCT 46.8 36.6 - 50.3 %    MCV 94.9 80.0 - 99.0 FL    MCH 30.8 26.0 - 34.0 PG    MCHC 32.5 30.0 - 36.5 g/dL    RDW 13.5 11.5 - 14.5 %    PLATELET 823 368 - 109 K/uL    MPV 9.3 8.9 - 12.9 FL    NRBC 0.0 0  WBC    ABSOLUTE NRBC 0.00 0.00 - 0.01 K/uL    NEUTROPHILS 76 (H) 32 - 75 %    LYMPHOCYTES 12 12 - 49 %    MONOCYTES 7 5 - 13 %    EOSINOPHILS 4 0 - 7 %    BASOPHILS 1 0 - 1 %    IMMATURE GRANULOCYTES 0 0.0 - 0.5 %    ABS. NEUTROPHILS 5.7 1.8 - 8.0 K/UL    ABS. LYMPHOCYTES 0.9 0.8 - 3.5 K/UL    ABS. MONOCYTES 0.5 0.0 - 1.0 K/UL    ABS. EOSINOPHILS 0.3 0.0 - 0.4 K/UL    ABS. BASOPHILS 0.1 0.0 - 0.1 K/UL    ABS. IMM.  GRANS. 0.0 0.00 - 0.04 K/UL    DF AUTOMATED     METABOLIC PANEL, COMPREHENSIVE    Collection Time: 05/24/21  8:55 AM   Result Value Ref Range    Sodium 140 136 - 145 mmol/L    Potassium 4.4 3.5 - 5.1 mmol/L    Chloride 104 97 - 108 mmol/L    CO2 33 (H) 21 - 32 mmol/L    Anion gap 3 (L) 5 - 15 mmol/L    Glucose 138 (H) 65 - 100 mg/dL    BUN 18 6 - 20 MG/DL    Creatinine 0.87 0.70 - 1.30 MG/DL    BUN/Creatinine ratio 21 (H) 12 - 20      GFR est AA >60 >60 ml/min/1.73m2    GFR est non-AA >60 >60 ml/min/1.73m2    Calcium 8.1 (L) 8.5 - 10.1 MG/DL    Bilirubin, total 0.7 0.2 - 1.0 MG/DL    ALT (SGPT) 35 12 - 78 U/L    AST (SGOT) 28 15 - 37 U/L    Alk. phosphatase 66 45 - 117 U/L    Protein, total 6.5 6.4 - 8.2 g/dL    Albumin 3.2 (L) 3.5 - 5.0 g/dL    Globulin 3.3 2.0 - 4.0 g/dL    A-G Ratio 1.0 (L) 1.1 - 2.2     TROPONIN I    Collection Time: 05/24/21  8:55 AM   Result Value Ref Range    Troponin-I, Qt. <0.05 <0.05 ng/mL   NT-PRO BNP    Collection Time: 05/24/21  8:55 AM   Result Value Ref Range    NT pro-BNP 1,968 (H) <125 PG/ML   SAMPLES BEING HELD    Collection Time: 05/24/21  8:55 AM   Result Value Ref Range    SAMPLES BEING HELD 1RED,1SST     COMMENT        Add-on orders for these samples will be processed based on acceptable specimen integrity and analyte stability, which may vary by analyte.    ECHO ADULT COMPLETE    Collection Time: 05/24/21  2:19 PM   Result Value Ref Range    IVSd 0.95 0.60 - 1.00 cm    LVIDd 3.75 (A) 4.20 - 5.90 cm    LVIDs 2.65 cm    LVOT d 1.76 cm    LVPWd 0.87 0.60 - 1.00 cm    LVOT Peak Gradient 2.60 mmHg    LVOT Peak Velocity 80.67 cm/s    RVIDd 3.39 cm    RVSP 31.65 mmHg    Left Atrium Major Axis 2.50 cm    LA Volume 37.93 18.0 - 58.0 mL    LA Area 4C 15.49 cm2    LA Vol 2C 28.65 18.00 - 58.00 mL    LA Vol 4C 43.53 18.00 - 58.00 mL    LA Volume DISK BP 35.18 18.0 - 58.0 mL    Est. RA Pressure 3.00 mmHg    Aortic Valve Area by Continuity of Peak Velocity 1.92 cm2    AoV PG 4.18 mmHg    Aortic Valve Systolic Peak Velocity 607.44 cm/s    MV A Lonnie 64.11 centimeter/second    Mitral Valve E Wave Deceleration Time 219.51 ms    MV E Lonnie 44.68 centimeter/second    LV E' Lateral Velocity 7.74 centimeter/second    LV E' Septal Velocity 7.17 centimeter/second    Mitral Valve Pressure Half-time 63.66 ms    MVA (PHT) 3.46 cm2    Pulmonic Valve Systolic Peak Instantaneous Gradient 1.35 mmHg    Pulmonic Regurgitant End Max Velocity 58.12 cm/s    Tapse 1.56 1.50 - 2.00 cm    Triscuspid Valve Regurgitation Peak Gradient 28.65 mmHg    TR Max Velocity 267.62 cm/s    Ao Root D 3.18 cm    LV Mass .5 88.0 - 224.0 g    LV Mass AL Index 53.5 49.0 - 115.0 g/m2    Left Atrium Minor Axis 1.33 cm    LA Vol Index 20.18 16.00 - 28.00 ml/m2    LA Vol Index 15.24 16.00 - 28.00 ml/m2    LA Vol Index 23.15 16.00 - 28.00 ml/m2    DULCE MARIA/BSA Pk Lonnie 1.0 cm2/m2   TROPONIN I    Collection Time: 05/24/21  3:46 PM   Result Value Ref Range    Troponin-I, Qt. <0.05 <1.74 ng/mL   METABOLIC PANEL, BASIC    Collection Time: 05/25/21  3:39 AM   Result Value Ref Range    Sodium 139 136 - 145 mmol/L    Potassium 3.6 3.5 - 5.1 mmol/L    Chloride 96 (L) 97 - 108 mmol/L    CO2 39 (H) 21 - 32 mmol/L    Anion gap 4 (L) 5 - 15 mmol/L    Glucose 104 (H) 65 - 100 mg/dL    BUN 23 (H) 6 - 20 MG/DL    Creatinine 1.14 0.70 - 1.30 MG/DL    BUN/Creatinine ratio 20 12 - 20      GFR est AA >60 >60 ml/min/1.73m2    GFR est non-AA >60 >60 ml/min/1.73m2    Calcium 7.8 (L) 8.5 - 10.1 MG/DL   CBC W/O DIFF    Collection Time: 05/25/21  3:39 AM   Result Value Ref Range    WBC 8.8 4.1 - 11.1 K/uL    RBC 4.99 4. 10 - 5.70 M/uL    HGB 15.5 12.1 - 17.0 g/dL    HCT 48.2 36.6 - 50.3 %    MCV 96.6 80.0 - 99.0 FL    MCH 31.1 26.0 - 34.0 PG    MCHC 32.2 30.0 - 36.5 g/dL    RDW 13.3 11.5 - 14.5 %    PLATELET 831 647 - 901 K/uL    MPV 9.7 8.9 - 12.9 FL    NRBC 0.0 0  WBC    ABSOLUTE NRBC 0.00 0.00 - 0.01 K/uL   NT-PRO BNP    Collection Time: 05/25/21  3:39 AM   Result Value Ref Range    NT pro-BNP 1,393 (H) <125 PG/ML   TROPONIN I    Collection Time: 05/25/21  3:39 AM   Result Value Ref Range    Troponin-I, Qt. <0.05 <0.05 ng/mL       Medications reviewed  Current Facility-Administered Medications   Medication Dose Route Frequency    sodium chloride 0.9 % bolus infusion 250 mL  250 mL IntraVENous ONCE    carvediloL (COREG) tablet 12.5 mg  12.5 mg Oral BID WITH MEALS    sodium chloride (NS) flush 5-40 mL  5-40 mL IntraVENous Q8H    sodium chloride (NS) flush 5-40 mL  5-40 mL IntraVENous PRN    acetaminophen (TYLENOL) tablet 650 mg  650 mg Oral Q6H PRN    Or    acetaminophen (TYLENOL) suppository 650 mg  650 mg Rectal Q6H PRN    polyethylene glycol (MIRALAX) packet 17 g  17 g Oral DAILY PRN    promethazine (PHENERGAN) tablet 12.5 mg  12.5 mg Oral Q6H PRN    Or    ondansetron (ZOFRAN) injection 4 mg  4 mg IntraVENous Q6H PRN    enoxaparin (LOVENOX) injection 40 mg  40 mg SubCUTAneous DAILY    [START ON 5/27/2021] ergocalciferol capsule 50,000 Units  50,000 Units Oral Q7D    [Held by provider] lisinopriL (PRINIVIL, ZESTRIL) tablet 10 mg  10 mg Oral DAILY    [Held by provider] furosemide (LASIX) injection 40 mg  40 mg IntraVENous BID       Care Plan discussed with: Patient/Family and Nurse    Total time spent with patient and review of records: 30 minutes.     Tyler Simpson MD

## 2021-05-25 NOTE — PROGRESS NOTES
Shift Change:      Bedside and Verbal shift change report given to [address] Dr[address] Dilan Diaz (oncoming nurse) by Karlos Brock (offgoing nurse). Report included the following information SBAR, Kardex and Recent Results. Shift Summary: This patient was assisted with Intentional Toileting every 2 hours during this shift. Documentation of ambulation and output reflected on Flowsheet. 0736: BP 79/53, patient is asymptomatic. 250mL bolus started. Will recheck BP.  0849: BP 97/66, cardiology notified. 1547: patient is okay to DC per cardiology, MD notified. 1650: patient to be discharged. Wife Richie Camacho will pick patient up at 1800.      DISCHARGE AT 1800

## 2021-05-25 NOTE — PROGRESS NOTES
Liver US okay- reviewed with patient  Okay for d/c CV wise  Will plan on resuming coreg tomorrow at 3.125 mg bid, instructed patient to take BP prior to taking and hold if SBP <110  Advised him to follow up w PCP on Thursday prior to going out of town for BP and resumption of meds if needed.    He will follow up will follow up w cardiology when he returns for resumption of meds and setting up Op stress test   Future Appointments   Date Time Provider Dee Candelaria   6/9/2021  1:30 PM Kelsey Sherwood NP [facility] AMB   7/29/2021  3:40 PM Mirian Grimaldo MD [facility] AM[facility]

## 2021-05-25 NOTE — PROGRESS NOTES
Dobutamine drip ordered and started per MD orders    0700-will discontinue dobutamine drip per MD orders- will place order for 250ml bolus

## 2021-05-25 NOTE — PROGRESS NOTES
Spiritual Care Assessment/Progress Note  [address]      NAME: Malcolm Marx      MRN: [id number]  AGE: 50 y.o.  SEX: male  Evangelical Affiliation: Episcopalian   Language: English     5/25/2021     Total Time (in minutes): 5     Spiritual Assessment begun in SF 3 PROG CARE TELE 2 through conversation with:         [x]Patient        [] Family    [] Friend(s)        Reason for Consult: [facility]     Spiritual beliefs: (Please include comment if needed)     [x] Identifies with a patric tradition:   Episcopalian      [] Supported by a patric community:            [] Claims no spiritual orientation:           [] Seeking spiritual identity:                [] Adheres to an individual form of spirituality:           [] Not able to assess:                           Identified resources for coping:      [x] Prayer                               [x] Music                  [] Guided Imagery     [x] Family/friends                 [] Pet visits     [] Devotional reading                         [] Unknown     [] Other:                                             Interventions offered during this visit: (See comments for more details)    Patient Interventions: Affirmation of patric, Communion (Episcopalian), Initial/Spiritual assessment, patient floor, Prayer (actual), Prayer (assurance of)           Plan of Care:     [] Support spiritual and/or cultural needs    [] Support AMD and/or advance care planning process      [] Support grieving process   [] Coordinate Rites and/or Rituals    [] Coordination with community clergy   [x] No spiritual needs identified at this time   [] Detailed Plan of Care below (See Comments)  [] Make referral to Music Therapy  [] Make referral to Pet Therapy     [] Make referral to Addiction services  [] Make referral to [facility]  [] Make referral to Spiritual Care Partner  [] No future visits requested        [] Follow up upon further referrals     Comments: Mr. Oskar Zhu was dressed and sitting in his chair. He is hoping to go home today. He was visited by Fr. Mistry and declined communion. He said he was fine and didn't need anything at this moment.     MARU Conner, RN, ACSW, LCSW   Page:  [phone]

## 2021-05-25 NOTE — PROGRESS NOTES
Problem: Falls - Risk of  Goal: *Absence of Falls  Description: Document Onalee Gum Fall Risk and appropriate interventions in the flowsheet.   Outcome: Progressing Towards Goal  Note: Fall Risk Interventions:  Mobility Interventions: Bed/chair exit alarm, Patient to call before getting OOB         Medication Interventions: Bed/chair exit alarm, Patient to call before getting OOB, Teach patient to arise slowly

## 2021-05-25 NOTE — PROGRESS NOTES
Accessed d/t MEWS 5. Spoke with Primary RN, Jose Elias Lloyd. Patient received lasix today and is asymptomatic. No need to assess or escalation. Will continue to monitor. 0530: Per Primary RN, Patient placed on non-titratable Dobutamine Gtt. No escalation needed at this time.

## 2021-05-25 NOTE — PROGRESS NOTES
3:54 PM    Transition of Care Plan:                    RUR N/A, pt is Low Risk of Readmission  1. Hospital admission for medical management   2. Cardiology consult- outpatient follow up with Dr. Fadumo Rice  3. Outpatient f/u PCP  4. Wife Usha Chen (C) [phone] to transport  5.  CM will follow for dc needs

## 2021-05-25 NOTE — NURSE NAVIGATOR
Chart reviewed by Heart Failure Nurse Navigator. Heart Failure database completed. EF: 50% with low normal systolic function, borderline low RV systolic function    ACEi/ARB/ARNi: Lisinopril 10 mg daily currently held for low BP    BB: Carvedilol 12.5 mg BID currently held for low BP    Aldosterone Antagonist: Not indicated. Obstructive Sleep Apnea Screening:   STOP-BANG score:   Referred to Sleep Medicine:     CRT Not indicated. NYHA Functional Class **. Heart Failure Teach Back in Patient Education. Heart Failure Avoiding Triggers on Discharge Instructions. Cardiologist:  Dr. [employer] consulted. Attempt made to see patient for HF education but patient was not present in room. Written HF Education material left at bedside. Post discharge follow up phone call to be made within 48-72 hours of discharge.

## 2021-05-25 NOTE — PROGRESS NOTES
[facility] [address]  [facility] [employer] [phone]  Mobile [phone]    Cardiology Progree Note    Date of  Admission: 2021  8:31 AM  PCP- MD Archana Watkins MD  :  1965   MRN:  [id number]       Admission Diagnosis: CHF exacerbation ([facility] [address] [I50.9]    Malcolm Marx is a 50 y.o. male admitted for CHF exacerbation ([facility] [address] [I50.9]. Consult requested by Samir Jane MD       Subjective:   CHF exacerbation ([facility] [address] [I50.9]         Impression Plan/Recommendation   1. Acute CHF  2. Pulmonary edema on xray   3. Hypertension   4. Hx of cardiomyopathy                      · Holding diuretics- had a great response  - 3L, but now hypotensive. S/p 250 cc fluid bolus  · Resume medications as tolerated. · Still w LE edema and distended abdomen, BS better/on RA  · PTA ACE/coreg- holding d/t hypotension. · Echo shows lvef unchanged 50%  · Records from [facility] reviewed   · Serial cardiac enzymes negative        Will arrange follow up w Dr Yojana Varma                   Subjective:  Malcolm Marx is a 50 y.o. male. No complaints of denies palpitations, irregular heart beat, SOB, chest pain. No lightheadedness and dizziness. No past medical history on file. No past surgical history on file. Hospital Medications:    No current facility-administered medications on file prior to encounter. Current Outpatient Medications on File Prior to Encounter   Medication Sig Dispense Refill    carvediloL (Coreg) 25 mg tablet Take 25 mg by mouth two (2) times daily (with meals).  lisinopriL (PRINIVIL, ZESTRIL) 10 mg tablet Take 10 mg by mouth daily.  ergocalciferol (ERGOCALCIFEROL) 1,250 mcg (50,000 unit) capsule Take 50,000 Units by mouth every seven (7) days. No Known Allergies          Review of Symptoms:  All other systems reviewed are negative except as above.     Physical Exam    Visit Vitals  BP 97/66   Pulse 79   Temp 97.6 °F (36.4 °C)   Resp 18   Ht 5' 6\" (1.676 m)   Wt 173 lb (78.5 kg)   SpO2 98%   BMI 27.92 kg/m²     General Appearance:  Well developed, well nourished,alert and oriented x 3, and individual in no acute distress. Ears/Nose/Mouth/Throat:   Hearing grossly normal.Normal oral mucosa,no scleral icterus     Neck: Supple + JVD    Chest:   Lungs clear to auscultation bilaterally,no rales rhonchi or wheezing   Cardiovascular:  Regular rate and rhythm, no Murmur. Abdomen:   Rounded, firm. Extremities: +1-trace edema bilaterally. Pulses detected, no varicosities   Skin: Warm and dry. No bruising  Neuro  Moves all extermities and neurologically intact                                                       Cardiographics    Telemetry: NSR      Cardiac testing      Recent Labs     05/25/21  0339   TROIQ <0.05       Recent Labs     05/25/21  0339 05/24/21  0855    140   K 3.6 4.4   CO2 39* 33*   BUN 23* 18   CREA 1.14 0.87   * 138*   WBC 8.8 7.4   HGB 15.5 15.2   HCT 48.2 46.8    285       I have discussed the diagnosis with the patient and the intended plan as seen in the above orders. Questions were answered concerning future plans. I have discussed medication side effects and warnings with the patient as well. Adia Khanna is in agreement to the plan listed above and wishes to proceed.

## 2021-05-25 NOTE — DISCHARGE SUMMARY
Physician Discharge Summary     Patient ID:    Reji Ghosh  [id number]  88 y.o.  1965  Mirta Sunshine MD    Admit date: 5/24/2021  Discharge date and time: 5/25/2021  Admission Diagnoses: CHF exacerbation ([address] [I50.9]  Discharge Medications:   Current Discharge Medication List      CONTINUE these medications which have CHANGED    Details   carvediloL (Coreg) 3.125 mg tablet Take 8 Tablets by mouth two (2) times daily (with meals). Qty: 60 Tablet, Refills: 0         CONTINUE these medications which have NOT CHANGED    Details   ergocalciferol (ERGOCALCIFEROL) 1,250 mcg (50,000 unit) capsule Take 50,000 Units by mouth every seven (7) days. STOP taking these medications       lisinopriL (PRINIVIL, ZESTRIL) 10 mg tablet Comments:   Reason for Stopping: Follow up Care:    1. Mitra Sunshine MD with in 1 weeks  2. specialists as directed. Diet:  Cardiac Diet  Disposition:  Home.   Advanced Directive:  Discharge Exam:  [See today's progress note.]  CONSULTATIONS: Cardiology    Significant Diagnostic Studies:   Recent Labs     05/25/21  0339 05/24/21  0855   WBC 8.8 7.4   HGB 15.5 15.2   HCT 48.2 46.8    285     Recent Labs     05/25/21  0339 05/24/21  0855    140   K 3.6 4.4   CL 96* 104   CO2 39* 33*   BUN 23* 18   CREA 1.14 0.87   * 138*   CA 7.8* 8.1*     Recent Labs     05/24/21  0855   ALT 35   AP 66   TBILI 0.7   TP 6.5   ALB 3.2*   GLOB 3.3       HOSPITAL COURSE & TREATMENT RENDERED:   Acute on chronic CHF exacerbation  -Received a dose of Lasix in the ED  -Echo with EF 50%  -Hold furosemide and  ACE due to hypotension   -beta-blocker with parameters  -Consult cardiology     Acute hypoxic respiratory failure  -Due to above  -Wean as tolerated to maintain sats above 90.     Chest pain  -Trend enzymes  -Cardiology consulted as above     Hypertension now hypotensive  -Hold ACE   -beta-blocker with parameters                 HPI:   Genia mendez 55 y.o. male who presented with left sided chest discomfort since this morning and generally not feeling well. The chest pain is more pressure like and does not radiate. He has no other complaints. He has been having mild SOB on exertion for the last month or so, which he seems to believe is due to dehydration. He had an echo done in [facility] several years ago and reports and EF of 55% and had a negative stress test around that time as well. His brother  suddenly of idiopathic cardiomyopathy. He seems fed up with giving multiple providers the same story, states he doesn't like going to doctors or hospitals. (Dr Mckenna Dia)     :BP has been low. He is sitting in chair and is asymptomatic. Was on a Dobutamine drip for a short period of time during the night but d/c as his EF was 50% on ECHO. Receiving bolus. Cards following. Review of Systems:   A comprehensive review of systems was negative except for that written in the HPI.     Objective:   Physical Exam:      Visit Vitals  BP (!) 82/60   Pulse 69   Temp 97.5 °F (36.4 °C)   Resp 20   Ht 5' 6\" (1.676 m)   Wt 173 lb (78.5 kg)   SpO2 98%   BMI 27.92 kg/m²    O2 Flow Rate (L/min): 2 l/min O2 Device: Nasal cannula     Temp (24hrs), Av.8 °F (36.6 °C), Min:97.5 °F (36.4 °C), Max:98.2 °F (36.8 °C)    No intake/output data recorded.  1901 -  0700  In: 240 [P.O.:240]  Out: 3000 [Urine:3000]     General:  Alert, cooperative, no distress, appears stated age. Head:  Normocephalic, without obvious abnormality, atraumatic. Eyes:  Conjunctivae/corneas clear. PERRL, EOMs intact. Nose: Nares normal. Septum midline. Mucosa normal. No drainage or sinus tenderness. Throat: Lips, mucosa, and tongue normal. Teeth and gums normal.   Neck: Supple, symmetrical, trachea midline, no adenopathy, thyroid: no enlargement/tenderness/nodules, no carotid bruit and no JVD. Back:   Symmetric, no curvature. ROM normal. No CVA tenderness.    Lungs:   Clear to auscultation bilaterally. Chest wall:  No tenderness or deformity. Heart:  Regular rate and rhythm, S1, S2 normal, no murmur, click, rub or gallop. Abdomen:   Soft, non-tender. Bowel sounds normal. No masses,  No organomegaly. Extremities: Lower extremities with 2+ edema. No calf tenderness or cords. Pulses: 2+ and symmetric all extremities. Skin: Skin color, texture, turgor normal. No rashes or lesions   Neurologic: CNII-XII intact. Alert and oriented X 3. Fine motor of hands and fingers normal.   equal.  No cogwheeling or rigidity. Gait not tested at this time. Sensation grossly normal to touch. Gross motor of extremities normal.        Data Review:   CXR 5/24/21  IMPRESSION  Small bilateral pleural effusions with underlying atelectasis and  mild edema.     Interpretation Summary- ECHO 5/24/21     · LV: Calculated LVEF is 50%. Normal cavity size and wall thickness. Low normal systolic function. · RV: Borderline low systolic function.      Comparison Study Information     Prior Study     There is no prior study available for comparison   Echo Findings     Left Ventricle Normal cavity size and wall thickness. The calculated EF is 50%. Low normal systolic function. Left Atrium Normal cavity size. Right Ventricle Normal cavity size. Borderline low systolic function. Right Atrium Normal cavity size. Aortic Valve Trileaflet valve structure and no stenosis. Mitral Valve Normal valve structure and no regurgitation. Tricuspid Valve Normal valve structure. Trace regurgitation. Pulmonic Valve Pulmonic valve not well visualized. Trace regurgitation. Aorta Normal aortic root.    Pericardium No evidence of pericardial effusion.                Signed:  Neha Plascencia MD  5/25/2021  4:28 PM

## 2021-05-25 NOTE — PROGRESS NOTES
6889- patient BP 70/51- notified Dr. De Guzman Sessions- will recheck BP in one hour- will start dobutamine drip if BP is lower. Patient is asymptomatic. Will also hold all diuretics for now.

## 2021-05-27 NOTE — PROGRESS NOTES
Physician Progress Note      Wendy Whitley  CSN #:                  [id number]  :                       1965  ADMIT DATE:       2021 8:31 AM  DISCH DATE:        2021 6:15 PM  RESPONDING  PROVIDER #:        Good Bangura MD          [facility]    This patient admitted for \"Acute CHF\". If possible, can you please further specify the type of CHF and  please document in progress notes and discharge summary further specificity regarding the type and acuity of CHF:      The medical record reflects the following:  Risk Factors:  Known CHF< HTN  known hx. of cardiomyopathy,  Clinical Indicators: Pulm edema on CXR, Ech LVEF is 50%. Low normal systolic function. Borderline low systolic function. BNP 7217,  systolic bp as low as 68-98Q  Treatment: Cards, Echo, IV Lasix, needs aggressive diuresis Increase lisionpril if needed. Cardiac enzymes. Thank you  Sarah HENDRICKSN,RN, [address], [facility]  [phone]  Options provided:  -- Acute on Chronic Systolic CHF/HFrEF  -- Acute on Chronic Diastolic CHF/HFpEF  -- Acute on Chronic Systolic and Diastolic CHF  -- Acute Systolic CHF/HFrEF  -- Acute Diastolic CHF/HFpEF  -- Acute Systolic and Diastolic CHF  -- Other - I will add my own diagnosis  -- Disagree - Not applicable / Not valid  -- Disagree - Clinically unable to determine / Unknown  -- Refer to Clinical Documentation Reviewer    PROVIDER RESPONSE TEXT:    This patient is in acute on chronic systolic CHF/HFrEF.     Query created by: Maurice Petty on 2021 2:40 PM      Electronically signed by:  Good Bangura MD 2021 8:53 AM

## 2021-05-28 ENCOUNTER — TELEPHONE (OUTPATIENT)
Dept: CASE MANAGEMENT | Age: 56
End: 2021-05-28

## 2021-05-28 NOTE — TELEPHONE ENCOUNTER
Telephone call made to patient for purpose of care transition. He and his wife were both on phone. Confirmed medications with him as well as follow up appointments. He saw his PCP in follow up yesterday. He had no questions related to discharge. He confirms he has follow up information for Cardiology and specialists as appropriate.

## 2021-12-22 ENCOUNTER — APPOINTMENT (OUTPATIENT)
Dept: GENERAL RADIOLOGY | Age: 56
DRG: 286 | End: 2021-12-22
Attending: INTERNAL MEDICINE
Payer: COMMERCIAL

## 2021-12-22 ENCOUNTER — APPOINTMENT (OUTPATIENT)
Dept: CT IMAGING | Age: 56
DRG: 286 | End: 2021-12-22
Attending: EMERGENCY MEDICINE
Payer: COMMERCIAL

## 2021-12-22 ENCOUNTER — APPOINTMENT (OUTPATIENT)
Dept: GENERAL RADIOLOGY | Age: 56
DRG: 286 | End: 2021-12-22
Attending: EMERGENCY MEDICINE
Payer: COMMERCIAL

## 2021-12-22 ENCOUNTER — APPOINTMENT (OUTPATIENT)
Dept: VASCULAR SURGERY | Age: 56
DRG: 286 | End: 2021-12-22
Attending: INTERNAL MEDICINE
Payer: COMMERCIAL

## 2021-12-22 ENCOUNTER — HOSPITAL ENCOUNTER (INPATIENT)
Age: 56
LOS: 8 days | Discharge: HOME | DRG: 286 | End: 2021-12-30
Attending: EMERGENCY MEDICINE | Admitting: INTERNAL MEDICINE
Payer: COMMERCIAL

## 2021-12-22 DIAGNOSIS — J96.01 ACUTE RESPIRATORY FAILURE WITH HYPOXIA (HCC): Primary | ICD-10-CM

## 2021-12-22 DIAGNOSIS — I42.9 CARDIOMYOPATHY (HCC): ICD-10-CM

## 2021-12-22 DIAGNOSIS — I51.9 RIGHT VENTRICULAR DYSFUNCTION: ICD-10-CM

## 2021-12-22 DIAGNOSIS — R09.02 HYPOXIA: ICD-10-CM

## 2021-12-22 DIAGNOSIS — I50.9 ACUTE CONGESTIVE HEART FAILURE, UNSPECIFIED HEART FAILURE TYPE (HCC): ICD-10-CM

## 2021-12-22 LAB
ALBUMIN SERPL-MCNC: 3.1 G/DL (ref 3.5–5)
ALBUMIN/GLOB SERPL: 0.9 {RATIO} (ref 1.1–2.2)
ALP SERPL-CCNC: 69 U/L (ref 45–117)
ALT SERPL-CCNC: 38 U/L (ref 12–78)
ANION GAP SERPL CALC-SCNC: 4 MMOL/L (ref 5–15)
ARTERIAL PATENCY WRIST A: YES
AST SERPL-CCNC: 26 U/L (ref 15–37)
BASE EXCESS BLDA CALC-SCNC: 5.6 MMOL/L
BASOPHILS # BLD: 0 K/UL (ref 0–0.1)
BASOPHILS NFR BLD: 0 % (ref 0–1)
BDY SITE: ABNORMAL
BILIRUB SERPL-MCNC: 0.8 MG/DL (ref 0.2–1)
BNP SERPL-MCNC: 2844 PG/ML
BUN SERPL-MCNC: 25 MG/DL (ref 6–20)
BUN/CREAT SERPL: 21 (ref 12–20)
CALCIUM SERPL-MCNC: 8.1 MG/DL (ref 8.5–10.1)
CHLORIDE SERPL-SCNC: 99 MMOL/L (ref 97–108)
CO2 SERPL-SCNC: 38 MMOL/L (ref 21–32)
COMMENT, HOLDF: NORMAL
COVID-19 RAPID TEST, COVR: NOT DETECTED
CREAT SERPL-MCNC: 1.18 MG/DL (ref 0.7–1.3)
DIFFERENTIAL METHOD BLD: ABNORMAL
EOSINOPHIL # BLD: 0.1 K/UL (ref 0–0.4)
EOSINOPHIL NFR BLD: 1 % (ref 0–7)
ERYTHROCYTE [DISTWIDTH] IN BLOOD BY AUTOMATED COUNT: 14.7 % (ref 11.5–14.5)
FLUAV AG NPH QL IA: NEGATIVE
FLUBV AG NOSE QL IA: NEGATIVE
GLOBULIN SER CALC-MCNC: 3.5 G/DL (ref 2–4)
GLUCOSE SERPL-MCNC: 136 MG/DL (ref 65–100)
HCO3 BLDA-SCNC: 36 MMOL/L (ref 22–26)
HCT VFR BLD AUTO: 53.4 % (ref 36.6–50.3)
HGB BLD-MCNC: 17 G/DL (ref 12.1–17)
IMM GRANULOCYTES # BLD AUTO: 0 K/UL (ref 0–0.04)
IMM GRANULOCYTES NFR BLD AUTO: 0 % (ref 0–0.5)
LYMPHOCYTES # BLD: 1 K/UL (ref 0.8–3.5)
LYMPHOCYTES NFR BLD: 10 % (ref 12–49)
MCH RBC QN AUTO: 31.7 PG (ref 26–34)
MCHC RBC AUTO-ENTMCNC: 31.8 G/DL (ref 30–36.5)
MCV RBC AUTO: 99.4 FL (ref 80–99)
MONOCYTES # BLD: 0.6 K/UL (ref 0–1)
MONOCYTES NFR BLD: 6 % (ref 5–13)
NEUTS SEG # BLD: 7.9 K/UL (ref 1.8–8)
NEUTS SEG NFR BLD: 83 % (ref 32–75)
NRBC # BLD: 0 K/UL (ref 0–0.01)
NRBC BLD-RTO: 0 PER 100 WBC
PCO2 BLDA: 81 MMHG (ref 35–45)
PH BLDA: 7.26 [PH] (ref 7.35–7.45)
PLATELET # BLD AUTO: 279 K/UL (ref 150–400)
PMV BLD AUTO: 9.4 FL (ref 8.9–12.9)
PO2 BLDA: 49 MMHG (ref 80–100)
POTASSIUM SERPL-SCNC: 4.1 MMOL/L (ref 3.5–5.1)
PROCALCITONIN SERPL-MCNC: <0.05 NG/ML
PROT SERPL-MCNC: 6.6 G/DL (ref 6.4–8.2)
RBC # BLD AUTO: 5.37 M/UL (ref 4.1–5.7)
SAMPLES BEING HELD,HOLD: NORMAL
SAO2 % BLD: 77 % (ref 92–97)
SAO2% DEVICE SAO2% SENSOR NAME: ABNORMAL
SERVICE CMNT-IMP: ABNORMAL
SODIUM SERPL-SCNC: 141 MMOL/L (ref 136–145)
SOURCE, COVRS: NORMAL
SPECIMEN SITE: ABNORMAL
TROPONIN-HIGH SENSITIVITY: 11 NG/L (ref 0–76)
TROPONIN-HIGH SENSITIVITY: 9 NG/L (ref 0–76)
WBC # BLD AUTO: 9.7 K/UL (ref 4.1–11.1)

## 2021-12-22 PROCEDURE — 36415 COLL VENOUS BLD VENIPUNCTURE: CPT

## 2021-12-22 PROCEDURE — 84484 ASSAY OF TROPONIN QUANT: CPT

## 2021-12-22 PROCEDURE — 71275 CT ANGIOGRAPHY CHEST: CPT

## 2021-12-22 PROCEDURE — 71045 X-RAY EXAM CHEST 1 VIEW: CPT

## 2021-12-22 PROCEDURE — 87635 SARS-COV-2 COVID-19 AMP PRB: CPT

## 2021-12-22 PROCEDURE — 74011000250 HC RX REV CODE- 250: Performed by: NURSE PRACTITIONER

## 2021-12-22 PROCEDURE — 36600 WITHDRAWAL OF ARTERIAL BLOOD: CPT

## 2021-12-22 PROCEDURE — 74018 RADEX ABDOMEN 1 VIEW: CPT

## 2021-12-22 PROCEDURE — APPSS45 APP SPLIT SHARED TIME 31-45 MINUTES: Performed by: NURSE PRACTITIONER

## 2021-12-22 PROCEDURE — 93005 ELECTROCARDIOGRAM TRACING: CPT

## 2021-12-22 PROCEDURE — 82803 BLOOD GASES ANY COMBINATION: CPT

## 2021-12-22 PROCEDURE — 85025 COMPLETE CBC W/AUTO DIFF WBC: CPT

## 2021-12-22 PROCEDURE — 80053 COMPREHEN METABOLIC PANEL: CPT

## 2021-12-22 PROCEDURE — 83880 ASSAY OF NATRIURETIC PEPTIDE: CPT

## 2021-12-22 PROCEDURE — 74011250637 HC RX REV CODE- 250/637: Performed by: INTERNAL MEDICINE

## 2021-12-22 PROCEDURE — 99223 1ST HOSP IP/OBS HIGH 75: CPT | Performed by: STUDENT IN AN ORGANIZED HEALTH CARE EDUCATION/TRAINING PROGRAM

## 2021-12-22 PROCEDURE — 74011000636 HC RX REV CODE- 636: Performed by: RADIOLOGY

## 2021-12-22 PROCEDURE — 87804 INFLUENZA ASSAY W/OPTIC: CPT

## 2021-12-22 PROCEDURE — 84145 PROCALCITONIN (PCT): CPT

## 2021-12-22 PROCEDURE — 99283 EMERGENCY DEPT VISIT LOW MDM: CPT

## 2021-12-22 PROCEDURE — 93970 EXTREMITY STUDY: CPT

## 2021-12-22 PROCEDURE — 65270000029 HC RM PRIVATE

## 2021-12-22 RX ORDER — LISINOPRIL 5 MG/1
5 TABLET ORAL DAILY
Status: DISCONTINUED | OUTPATIENT
Start: 2021-12-23 | End: 2021-12-25

## 2021-12-22 RX ORDER — ENOXAPARIN SODIUM 100 MG/ML
40 INJECTION SUBCUTANEOUS DAILY
Status: DISCONTINUED | OUTPATIENT
Start: 2021-12-23 | End: 2021-12-30 | Stop reason: HOSPADM

## 2021-12-22 RX ORDER — POLYETHYLENE GLYCOL 3350 17 G/17G
17 POWDER, FOR SOLUTION ORAL DAILY PRN
Status: DISCONTINUED | OUTPATIENT
Start: 2021-12-22 | End: 2021-12-30 | Stop reason: HOSPADM

## 2021-12-22 RX ORDER — ONDANSETRON 4 MG/1
4 TABLET, ORALLY DISINTEGRATING ORAL
Status: DISCONTINUED | OUTPATIENT
Start: 2021-12-22 | End: 2021-12-30 | Stop reason: HOSPADM

## 2021-12-22 RX ORDER — THERA TABS 400 MCG
1 TAB ORAL DAILY
Status: ON HOLD | COMMUNITY
End: 2022-06-17

## 2021-12-22 RX ORDER — ACETAMINOPHEN 650 MG/1
650 SUPPOSITORY RECTAL
Status: DISCONTINUED | OUTPATIENT
Start: 2021-12-22 | End: 2021-12-30 | Stop reason: HOSPADM

## 2021-12-22 RX ORDER — ONDANSETRON 2 MG/ML
4 INJECTION INTRAMUSCULAR; INTRAVENOUS
Status: DISCONTINUED | OUTPATIENT
Start: 2021-12-22 | End: 2021-12-30 | Stop reason: HOSPADM

## 2021-12-22 RX ORDER — LISINOPRIL 5 MG/1
5 TABLET ORAL DAILY
COMMUNITY
End: 2021-12-30

## 2021-12-22 RX ORDER — SODIUM CHLORIDE 0.9 % (FLUSH) 0.9 %
5-40 SYRINGE (ML) INJECTION EVERY 8 HOURS
Status: DISCONTINUED | OUTPATIENT
Start: 2021-12-22 | End: 2021-12-30 | Stop reason: HOSPADM

## 2021-12-22 RX ORDER — ASCORBIC ACID 500 MG
250 TABLET ORAL DAILY
COMMUNITY

## 2021-12-22 RX ORDER — CARVEDILOL 25 MG/1
25 TABLET ORAL 2 TIMES DAILY WITH MEALS
COMMUNITY
End: 2021-12-30

## 2021-12-22 RX ORDER — ACETAMINOPHEN 325 MG/1
650 TABLET ORAL
Status: DISCONTINUED | OUTPATIENT
Start: 2021-12-22 | End: 2021-12-30 | Stop reason: HOSPADM

## 2021-12-22 RX ORDER — CARVEDILOL 6.25 MG/1
25 TABLET ORAL 2 TIMES DAILY WITH MEALS
Status: DISCONTINUED | OUTPATIENT
Start: 2021-12-22 | End: 2021-12-27

## 2021-12-22 RX ORDER — FUROSEMIDE 20 MG/1
20 TABLET ORAL
COMMUNITY
End: 2022-01-20 | Stop reason: SDUPTHER

## 2021-12-22 RX ORDER — BUMETANIDE 0.25 MG/ML
1 INJECTION INTRAMUSCULAR; INTRAVENOUS ONCE
Status: COMPLETED | OUTPATIENT
Start: 2021-12-22 | End: 2021-12-22

## 2021-12-22 RX ORDER — UREA 10 %
100 LOTION (ML) TOPICAL DAILY
COMMUNITY
End: 2022-03-01

## 2021-12-22 RX ORDER — SODIUM CHLORIDE 0.9 % (FLUSH) 0.9 %
5-40 SYRINGE (ML) INJECTION AS NEEDED
Status: DISCONTINUED | OUTPATIENT
Start: 2021-12-22 | End: 2021-12-30 | Stop reason: HOSPADM

## 2021-12-22 RX ADMIN — IOPAMIDOL 80 ML: 755 INJECTION, SOLUTION INTRAVENOUS at 14:42

## 2021-12-22 RX ADMIN — Medication 10 ML: at 22:15

## 2021-12-22 RX ADMIN — BUMETANIDE 1 MG: 0.25 INJECTION INTRAMUSCULAR; INTRAVENOUS at 16:47

## 2021-12-22 RX ADMIN — CARVEDILOL 25 MG: 12.5 TABLET, FILM COATED ORAL at 17:57

## 2021-12-22 NOTE — ED PROVIDER NOTES
Mr. Raisa Hester is a 52yo male who presents to the ER with complaints of fatigue and shortness of breath. He reports that he has a history of congestive heart failure. He has felt bad for the last 3 to 4 days. He went to see his primary care doctor today because his oxygen saturations at home more than normal.  His oxygen saturations normally range between 85 to 95% at home. His oxygen saturations were in the mid 70s last 4 days. He went to his primary care doctor's office and his oxygen saturations there were in the low 70s. Therefore, he was sent to the ER. He denies runny nose, sore throat, or cough. He said that he has been vaccinated for Covid. He has not received the booster. His last vaccination was over the summer. He denies any chest pain or abdominal pain. No changes with his urine or bowel movements. He denies any other complaints. No past medical history on file. No past surgical history on file. No family history on file. Social History     Socioeconomic History    Marital status:      Spouse name: Not on file    Number of children: Not on file    Years of education: Not on file    Highest education level: Not on file   Occupational History    Not on file   Tobacco Use    Smoking status: Not on file    Smokeless tobacco: Not on file   Substance and Sexual Activity    Alcohol use: Not on file    Drug use: Not on file    Sexual activity: Not on file   Other Topics Concern    Not on file   Social History Narrative    Not on file     Social Determinants of Health     Financial Resource Strain:     Difficulty of Paying Living Expenses: Not on file   Food Insecurity:     Worried About Running Out of Food in the Last Year: Not on file    Margarito of Food in the Last Year: Not on file   Transportation Needs:     Lack of Transportation (Medical): Not on file    Lack of Transportation (Non-Medical):  Not on file   Physical Activity:     Days of Exercise per Week: Not on file    Minutes of Exercise per Session: Not on file   Stress:     Feeling of Stress : Not on file   Social Connections:     Frequency of Communication with Friends and Family: Not on file    Frequency of Social Gatherings with Friends and Family: Not on file    Attends Shinto Services: Not on file    Active Member of Clubs or Organizations: Not on file    Attends Club or Organization Meetings: Not on file    Marital Status: Not on file   Intimate Partner Violence:     Fear of Current or Ex-Partner: Not on file    Emotionally Abused: Not on file    Physically Abused: Not on file    Sexually Abused: Not on file   Housing Stability:     Unable to Pay for Housing in the Last Year: Not on file    Number of [place] in the Last Year: Not on file    Unstable Housing in the Last Year: Not on file         ALLERGIES: Patient has no known allergies. Review of Systems   Constitutional: Positive for fatigue. Negative for chills and fever. HENT: Negative for rhinorrhea and sore throat. Respiratory: Positive for shortness of breath. Negative for cough. Cardiovascular: Negative for chest pain. Gastrointestinal: Negative for abdominal pain, diarrhea, nausea and vomiting. Genitourinary: Negative for dysuria and hematuria. Musculoskeletal: Negative for arthralgias and myalgias. Skin: Negative for pallor and rash. Neurological: Negative for dizziness, weakness and light-headedness. All other systems reviewed and are negative. Vitals:    12/22/21 1134   BP: 137/79   Pulse: 91   Resp: 20   Temp: 97.3 °F (36.3 °C)   SpO2: (!) 69%   Weight: 80.3 kg (177 lb)   Height: 5' 7\" (1.702 m)            Physical Exam     Vital signs reviewed. Nursing notes reviewed.     Const:  No acute distress, well developed, well nourished  Head:  Atraumatic, normocephalic  Eyes:  PERRL, conjunctiva normal, no scleral icterus  Neck:  Supple, trachea midline  Cardiovascular: Regular rate  Resp: Mild resp distress, mildly increased work of breathing  Abd:  Soft, non-tender, non-distended, no rebound, no guarding  MSK: Bilateral nonpitting pedal edema, normal ROM  Neuro:  Alert and oriented x3, no cranial nerve defect  Skin:  Warm, dry, intact  Psych: normal mood and affect, behavior is normal, judgement and thought content is normal          MDM  Number of Diagnoses or Management Options     Amount and/or Complexity of Data Reviewed  Clinical lab tests: ordered and reviewed  Tests in the radiology section of CPT®: ordered and reviewed  Review and summarize past medical records: yes    Patient Progress  Patient progress: stable         Procedures      Perfect Serve Consult for Admission  3:08 PM    ED Room Number: ER16/16  Patient Name and age:  Mika Lundberg 49 y.o.  male  Working Diagnosis:   1. Acute respiratory failure with hypoxia ([facility] [address]    2. Acute congestive heart failure, unspecified heart failure type ([facility] [address]        COVID-19 Suspicion:  no  Sepsis present:  no  Reassessment needed: no  Code Status:  Full Code  Readmission: no  Isolation Requirements:  no  Recommended Level of Care:  telemetry  Department:[facility] ED - [phone]  Other: sats in the 70s on room air, 90s on NC. Mr. Dory Mccain is a 50yo male who presents to the ER with complaints of SOB with oxygen sats in the 60s. Pt. With an elevated BNP but no avert edema on xray or CT. No PE.  Pt's sats improve with supplemental oxygen. Pt. To be evaluated for admission by the hospitalist.      Total critical care time spent exclusive of procedures:  35 min.

## 2021-12-22 NOTE — Clinical Note
Pt states he \"has no idea\" what we are doing for him today. Dr Ryne Schwartz phoned for informed consent.

## 2021-12-22 NOTE — PROGRESS NOTES
12/22/2021  4:13 PM  Case management note      Reason for Admission:  Acute respiratory failure    Patient came to ED for SOB and fatigue x 2 weeks. Patient is , drives and independent with ADL's. Patient has history of CHF. [facility] [address]  Follows a Dr. Edin Jeffrey cardiology in [place]. RUR Score:         low            Plan for utilizing home health:     Patient is independent and will most likely not qualify for home health services     PCP: First and Last name:  Indira Vasquez MD     Name of Practice:    Are you a current patient: Yes/No: yes   Approximate date of last visit: 12/22/2021   Can you participate in a virtual visit with your PCP:                     Current Advanced Directive/Advance Care Plan: Full Code      Healthcare Decision Maker:   Melly Gore spouse                              Transition of Care Plan:                1. Home with family assistance  2. PCP follow up        3. AD planning  4. CM to follow for discharge needs    Care Management Interventions  PCP Verified by CM:  Yes (Dr. Johnny Culver)  Support Systems: Spouse/Significant Other  Confirm Follow Up Transport: Family  The Plan for Transition of Care is Related to the Following Treatment Goals : acute respiratory failure  Discharge Location  Discharge Placement: Home with family assistance  Shahramrey Martingiacomo

## 2021-12-22 NOTE — Clinical Note
Right internal jugular vein. Accessed successfully. Micropuncture needle used. Using ultrasound guidance.  Number of attempts =  1.

## 2021-12-22 NOTE — Clinical Note
TRANSFER - OUT REPORT:     Verbal report given to: Maicol Mitchell. Report consisted of patient's Situation, Background, Assessment and   Recommendations(SBAR). Opportunity for questions and clarification was provided. Patient transported with a Registered Nurse. Patient transported to: [address] [place] [address], [facility].

## 2021-12-22 NOTE — ED TRIAGE NOTES
Patient presents with 2 weeks of shortness of breath-    Patient denies having a Covid-19 test recently- denies cough or fevers

## 2021-12-22 NOTE — PROGRESS NOTES
[facility]: MED RECONCILIATION    Comments/Recommendations:   Patient alert and oriented for medication reconciliation and knowledgeable regarding medications. Patient took AM medications today prior to hospital arrival.  Confirmed NKDA and [facility] [address] Medications added:     Lisinopril  Furosemide  Vitamin C  MVI  Vitamin B12    Medications removed:    Ergocalciferol     Medications adjusted:    none    Information obtained from: patient, Rx query    Allergies: Patient has no known allergies. Prior to Admission Medications:     Medication Documentation Review Audit       Reviewed by Yahaira Dean (Pharmacist) on 12/22/21 at 1547      Medication Sig Documenting Provider Last Dose Status Taking?   ascorbic acid, vitamin C, (Vitamin C) 500 mg tablet Take 500 mg by mouth daily. Provider, Historical 12/22/2021 AM Active Yes   carvediloL (COREG) 25 mg tablet Take 25 mg by mouth two (2) times daily (with meals). Provider, Historical 12/22/2021 AM Active Yes   cyanocobalamin (Vitamin B-12) 100 mcg tablet Take 100 mcg by mouth daily. Provider, Historical 12/22/2021 AM Active Yes   furosemide (Lasix) 20 mg tablet Take 20 mg by mouth daily as needed. Provider, Historical 12/22/2021 AM Active Yes           Med Note (YAHAIRA WISE   Wed Dec 22, 2021  3:47 PM) Takes most days   lisinopriL (PRINIVIL, ZESTRIL) 5 mg tablet Take 5 mg by mouth daily. Provider, Historical 12/22/2021 AM Active Yes   therapeutic multivitamin (THERAGRAN) tablet Take 1 Tablet by mouth daily.  Provider, Historical 12/22/2021 AM Active Yes                    Thank you,   Yahaira Wise, PharmD, BCPS   Contact: [phone]

## 2021-12-22 NOTE — PROGRESS NOTES
Patient stated that he was sent for low oxygen, I greeted the patient at the registration desk to assess his Oxygen. His oxygen levels ranged between 69% and 74%. I will place patient on 2L of oxygen via NC to make comfortable.

## 2021-12-22 NOTE — Clinical Note
TRANSFER - IN REPORT:     [facility] Report consisted of patient's Situation, Background, Assessment and   Recommendations(SBAR). Opportunity for questions and clarification was provided. Assessment completed upon patient's arrival to unit and care assumed. Patient transported with a Registered Nurse.

## 2021-12-22 NOTE — PROGRESS NOTES
Cardiology Initial Care Encounter    Patient: Rodrigue James MRN: [id number]     YOB: 1965  Age: 49 y.o. Sex: male      Admit Date: 12/22/2021       Assessment/Plan     1. Acute hypoxic resp failure: supplemental oxygen, ABG pending    2 A/C HF mildly reduced EF: start Bumex 1 mg IV now, ECHO. Resume coreg/ACE-I. NPO at midnight for stress test if resp status is improved. Daily weigghts, I.O.   3. HTN: resume OP meds   4. previous hx cardiomyopathy 2016 EF 40-45%         ATTENDING CARDIOLOGIST  The patient was personally examined and chart reviewed. All the elements of history and examination were personally performed and I agree with the plan as listed by advanced practice provider. Treatment plan was addressed with the patient. Subjective:  Progressive exertional dyspnea with hypoxia    Blood pressure 107/85, pulse 91, temperature 97.3 °F (36.3 °C), resp. rate 20, height 5' 7\" (1.702 m), weight 177 lb (80.3 kg), SpO2 93 %. Normal rate, regular rhythm, S1/S2  Lungs clear      A/P:  Acute on chronic hypoxic/hypercapnic respiratory failure  Biventricular dysfunction, right greater than left  Hx of Klippel-Feil Syndrome    Personally reviewed echocardiogram.  He has biventricular dysfunction right greater than left. Reports having severe hypotension after aggressive diuresis in the spring 2021. At this time I would not aggressively diurese. Would withhold any further diuresis at this time. Recommend pulmonary evaluation for his respiratory failure. Long-term he would benefit from cardiac MRI of his possible with his prior thoracic fusion rods. In the interim recommend repeat echocardiogram w/ bubble to study to exclude ASD/VSD as etiology of cyanosis and hypoxia. May need RHC w/ shunt run.       [x]    High complexity decision making was performed  [x]    Patient is at high-risk of decompensation with multiple organ involvement    Nakul Ramires, [address] [place] [address], DO  [facility] [address] [place] [address], [address] [place] [address], [id number] [place] [address] [place]                                              Office [phone],RNL [phone]             ELLIE Mensah is a 49 y.o.   male  with PMH of HF r EF, EF improved after GDMT, previously seen by Dr Moises Coffman in [place]. He was seen by [facility] in[facility] in May this year but never followed up as OP for stress test. Says he take salsix as need at home. Baselin weight 165#. Denies edema, Was found to be SOB at PCP office today and sent to the ED. The patient denies chest pain, dependent edema, diaphoresis, HERRERA, orthopnea, palpitations, PND,  claudication or syncope. No bleeding. The patient has been referred to cardiology for on going management of HF P EF. Review of Symptoms:  Constitutional: positive for fatigue  ENT: negative   Respiratory: positive for dyspnea on exertion  Gastrointestinal: negative for reflux symptoms  Genitourinary: no dysuria, hematuria, frequency   Musculoskeletal:negative for back pain  Neurological: positive for memory problems  Other systems reviewed and negative except as above. Previous cardiac hx  [facility]   Dr Moises Coffman  Echo 2016 ef 40-45%  Ef improved w GDMT  Echo 2017 EF 55%  Normal nuclear stress test  2017     Brother  from CHF in his 45s   Father AA s/p repair at age 75 yo     21    ECHO ADULT COMPLETE 2021    Interpretation Summary  · LV: Calculated LVEF is 50%. Normal cavity size and wall thickness. Low normal systolic function. · RV: Borderline low systolic function. Signed by: Sven Dick MD on 2021  2:39 PM        Risk factors:     Social History     Tobacco Use    Smoking status: Not on file    Smokeless tobacco: Not on file   Substance Use Topics    Alcohol use: Not on file     No family history on file.     Current Facility-Administered Medications   Medication Dose Route Frequency    sodium chloride (NS) flush 5-40 mL  5-40 mL IntraVENous Q8H    sodium chloride (NS) flush 5-40 mL  5-40 mL IntraVENous PRN    acetaminophen (TYLENOL) tablet 650 mg  650 mg Oral Q6H PRN    Or    acetaminophen (TYLENOL) suppository 650 mg  650 mg Rectal Q6H PRN    polyethylene glycol (MIRALAX) packet 17 g  17 g Oral DAILY PRN    ondansetron (ZOFRAN ODT) tablet 4 mg  4 mg Oral Q8H PRN    Or    ondansetron (ZOFRAN) injection 4 mg  4 mg IntraVENous Q6H PRN    [START ON 12/23/2021] enoxaparin (LOVENOX) injection 40 mg  40 mg SubCUTAneous DAILY    bumetanide (BUMEX) injection 1 mg  1 mg IntraVENous ONCE     Current Outpatient Medications   Medication Sig    carvediloL (COREG) 25 mg tablet Take 25 mg by mouth two (2) times daily (with meals).  furosemide (Lasix) 20 mg tablet Take 20 mg by mouth daily as needed.  lisinopriL (PRINIVIL, ZESTRIL) 5 mg tablet Take 5 mg by mouth daily.  ascorbic acid, vitamin C, (Vitamin C) 500 mg tablet Take 500 mg by mouth daily.  therapeutic multivitamin (THERAGRAN) tablet Take 1 Tablet by mouth daily.  cyanocobalamin (Vitamin B-12) 100 mcg tablet Take 100 mcg by mouth daily. Objective:     Vitals:    12/22/21 1134 12/22/21 1334 12/22/21 1556   BP: 137/79 111/85    Pulse: 91     Resp: 20     Temp: 97.3 °F (36.3 °C)     SpO2: (!) 69% 92% (!) 89%   Weight: 80.3 kg (177 lb)     Height: 5' 7\" (1.702 m)          Intake and Output:  Current Shift: No intake/output data recorded. Last three shifts: No intake/output data recorded. Gen: Well-developed, well-nourished, in no acute distress  Neck: Supple,No JVD, No Carotid Bruit,   Resp: No accessory muscle use, Clear breath sounds, diminished bases. Card: Regular Rate,  Rythm,  Normal S1, S2, No murmurs, rubs or gallop.    Abd:  Soft, non-tender, non-distended,BS+,   MSK: No cyanosis  Skin: No rashes    Neuro: moving all four extremities , follows commands appropriately  Psych:  Fair nsight, oriented to person, place , alert, Nml Affect  LE: 2+ LE  edema    EKG:   EKG Results     None            TELEMETRY: SR     Lab/Data Review:  pBNP 2844  Troponin 9   COVID rapid not detected      Signed By: Sherry Ibrahim NP     December 22, 2021

## 2021-12-22 NOTE — H&P
[facility]  [address] [facility], [address] [place] [address]  [phone]    Admission History and Physical      NAME:  Jenna Gamez   :   1965   MRN:  [id number]     PCP:  Tirso Solomon MD     Date/Time of service:  2021          Subjective:     CHIEF COMPLAINT: Dyspnea    HISTORY OF PRESENT ILLNESS:     Mr. Janna Aguilar is a 49 y.o. male with a past medical history of hypertension, CHF who is admitted with acute hypoxic respiratory failure. Mr. Janna Aguilar states that for the past few months he has had some shortness of breath; however, over the past few weeks he has had increased shortness of breath. He states he went to his PCP today due to concerns for low oxygen saturations at home; his PCP sent him to the ER. In the emergency room, his oxygen saturations were found to be in the mid 70s. He denies any associated chest pain, syncope, fevers or cough. He has some lower extremity swelling. He is unable to provide any relieving or exacerbating factors for his dyspnea. No Known Allergies    Prior to Admission medications    Medication Sig Start Date End Date Taking? Authorizing Provider   carvediloL (COREG) 25 mg tablet Take 25 mg by mouth two (2) times daily (with meals). Yes Provider, Historical   furosemide (Lasix) 20 mg tablet Take 20 mg by mouth daily as needed. Yes Provider, Historical   lisinopriL (PRINIVIL, ZESTRIL) 5 mg tablet Take 5 mg by mouth daily. Yes Provider, Historical   ascorbic acid, vitamin C, (Vitamin C) 500 mg tablet Take 500 mg by mouth daily. Yes Provider, Historical   therapeutic multivitamin (THERAGRAN) tablet Take 1 Tablet by mouth daily. Yes Provider, Historical   cyanocobalamin (Vitamin B-12) 100 mcg tablet Take 100 mcg by mouth daily.    Yes Provider, Historical       Past Medical History:   Diagnosis Date    Heart failure ([address]     Hypertension         Past Surgical History:   Procedure Laterality Date    HX ORTHOPAEDIC Social History     Tobacco Use    Smoking status: Never Smoker    Smokeless tobacco: Never Used   Substance Use Topics    Alcohol use: Yes     Comment: socially         Family History   Problem Relation Age of Onset    Heart Disease Brother         Review of Systems:  Per HPI    Gen:  Weight gain, weight loss, fever, chills, fatigue  Eyes:  Visual changes, pain,   ENT:  rhinorrhea, sore throat   CVS:  Palpitations, chest pain, dizziness, syncope, edema, orthopnea  Pulm:  Cough, dyspnea, sputum, hemoptysis, wheezing  GI:  Abdominal pain, nausea, emesis, diarrhea, constipation, GERD, melena  :  Hematuria, incontinence, dysuria  MS:  Pain, weakness, swelling, arthritis  Skin:  Rash, erythema, wound  Psych:   Insomnia, depression, anxiety, suicidal ideation  Endo:  Heat intolerance, cold intolerance, polyuria  Hem:  Enlarged nodes, bruising, bleeding, night sweats  Renal:   change in urine, flank pain  Neuro:  Numbness, tingling, tremors          Objective:      VITALS:    Vital signs reviewed; most recent are:    Visit Vitals  /85   Pulse 91   Temp 97.3 °F (36.3 °C)   Resp 20   Ht 5' 7\" (1.702 m)   Wt 80.3 kg (177 lb)   SpO2 93%   BMI 27.72 kg/m²     SpO2 Readings from Last 6 Encounters:   12/22/21 93%   05/25/21 93%    O2 Flow Rate (L/min): 2 l/min   No intake or output data in the 24 hours ending 12/22/21 1726     Exam:     Physical Exam:    Gen:  Well-developed, well-nourished, in no acute distress  HEENT:  Pink conjunctivae, PERRL, hearing intact to voice  Resp:  No accessory muscle use, clear breath sounds without wheezes rales or rhonchi  Card:  RRR, No murmurs, normal S1, S2, b/l peripheral edema  Abd:  Soft, non-tender, non-distended, normoactive bowel sounds are present  Musc:  No cyanosis or clubbing  Skin:  No rashes or ulcers, skin turgor is good  Neuro:  Cranial nerves 3-12 are grossly intact,  follows commands appropriately  Psych:  Oriented to person, place, and time, Alert with good insight      Labs:    Recent Labs     12/22/21  1312   WBC 9.7   HGB 17.0   HCT 53.4*        Recent Labs     12/22/21  1312      K 4.1   CL 99   CO2 38*   *   BUN 25*   CREA 1.18   CA 8.1*   ALB 3.1*   TBILI 0.8   ALT 38     No results found for: GLUCPOC  Recent Labs     12/22/21  1626   PH 7.26*   PCO2 81*   PO2 49*   HCO3 36*     No results for input(s): INR, INREXT, INREXT in the last 72 hours. Radiology and EKG reviewed:   CTA chest unremarkable for PE. Old Records reviewed in [facility]       Assessment/Plan:           Acute respiratory failure with hypoxia (HCC) (12/22/2021)/ dyspnea: Suspect due to acute on chronic heart failure. CTA chest negative for PE. Check respiratory viral panel. Check ABG. Check UDS. Currently on nasal cannula; wean as tolerated. Acute on chronic heart failure ([facility] (12/22/2021)/ Hx of cardiomyopathy: Unclear trigger. proBNP elevated to 844. trend troponins. Obtain echo. IV diuretics as tolerated. Consult cardiology. LE swelling: suspect due to heart failure. Check LE dopplers. Hypertension: Continue home Coreg and lisinopril.        Risk of deterioration: high      Total time spent with patient: 48 Minutes **I personally saw and examined the patient during this time period**                 Care Plan discussed with: Patient and Nursing Staff    Discussed:  Code Status and Care Plan    Prophylaxis:  Lovenox    Probable Disposition:  Home w/Family           ___________________________________________________    Attending Physician: Elise Zabala DO

## 2021-12-23 ENCOUNTER — APPOINTMENT (OUTPATIENT)
Dept: NON INVASIVE DIAGNOSTICS | Age: 56
DRG: 286 | End: 2021-12-23
Attending: STUDENT IN AN ORGANIZED HEALTH CARE EDUCATION/TRAINING PROGRAM
Payer: COMMERCIAL

## 2021-12-23 LAB
ALBUMIN SERPL-MCNC: 3 G/DL (ref 3.5–5)
ALBUMIN/GLOB SERPL: 0.9 {RATIO} (ref 1.1–2.2)
ALP SERPL-CCNC: 69 U/L (ref 45–117)
ALT SERPL-CCNC: 37 U/L (ref 12–78)
AMPHET UR QL SCN: NEGATIVE
ANION GAP SERPL CALC-SCNC: 3 MMOL/L (ref 5–15)
ARTERIAL PATENCY WRIST A: POSITIVE
AST SERPL-CCNC: 26 U/L (ref 15–37)
ATRIAL RATE: 91 BPM
B PERT DNA SPEC QL NAA+PROBE: NOT DETECTED
BARBITURATES UR QL SCN: NEGATIVE
BASE EXCESS BLD CALC-SCNC: 9.6 MMOL/L
BASOPHILS # BLD: 0 K/UL (ref 0–0.1)
BASOPHILS NFR BLD: 0 % (ref 0–1)
BDY SITE: ABNORMAL
BENZODIAZ UR QL: NEGATIVE
BILIRUB SERPL-MCNC: 0.7 MG/DL (ref 0.2–1)
BORDETELLA PARAPERTUSSIS PCR, BORPAR: NOT DETECTED
BUN SERPL-MCNC: 28 MG/DL (ref 6–20)
BUN/CREAT SERPL: 25 (ref 12–20)
C PNEUM DNA SPEC QL NAA+PROBE: NOT DETECTED
CALCIUM SERPL-MCNC: 7.8 MG/DL (ref 8.5–10.1)
CALCULATED P AXIS, ECG09: 55 DEGREES
CALCULATED R AXIS, ECG10: 111 DEGREES
CALCULATED T AXIS, ECG11: 16 DEGREES
CANNABINOIDS UR QL SCN: NEGATIVE
CHLORIDE SERPL-SCNC: 100 MMOL/L (ref 97–108)
CO2 SERPL-SCNC: 38 MMOL/L (ref 21–32)
COCAINE UR QL SCN: NEGATIVE
CREAT SERPL-MCNC: 1.12 MG/DL (ref 0.7–1.3)
DIAGNOSIS, 93000: NORMAL
DIFFERENTIAL METHOD BLD: ABNORMAL
DRUG SCRN COMMENT,DRGCM: NORMAL
ECHO AO ASC DIAM: 2.4 CM
ECHO AO ASCENDING AORTA INDEX: 1.25 CM/M2
ECHO AV AREA PEAK VELOCITY: 1.9 CM2
ECHO AV AREA PEAK VELOCITY: 2.1 CM2
ECHO AV AREA PEAK VELOCITY: 2.4 CM2
ECHO AV PEAK GRADIENT: 3 MMHG
ECHO AV PEAK VELOCITY: 0.9 M/S
ECHO AV VELOCITY RATIO: 0.67
ECHO LA DIAMETER INDEX: 1.41 CM/M2
ECHO LA DIAMETER: 2.7 CM
ECHO LA VOL 2C: 17 ML (ref 18–58)
ECHO LA VOL 4C: 16 ML (ref 18–58)
ECHO LA VOL BP: 18 ML (ref 18–58)
ECHO LA VOL BP: 18 ML (ref 18–58)
ECHO LA VOLUME AREA LENGTH: 21 ML
ECHO LA VOLUME INDEX A2C: 9 ML/M2 (ref 16–34)
ECHO LA VOLUME INDEX A4C: 8 ML/M2 (ref 16–34)
ECHO LA VOLUME INDEX AREA LENGTH: 11 ML/M2 (ref 16–34)
ECHO LV E' LATERAL VELOCITY: 7 CM/S
ECHO LV E' SEPTAL VELOCITY: 5 CM/S
ECHO LV FRACTIONAL SHORTENING: 26 % (ref 28–44)
ECHO LV INTERNAL DIMENSION DIASTOLE INDEX: 1.77 CM/M2
ECHO LV INTERNAL DIMENSION DIASTOLIC: 3.4 CM (ref 4.2–5.9)
ECHO LV INTERNAL DIMENSION SYSTOLIC INDEX: 1.3 CM/M2
ECHO LV INTERNAL DIMENSION SYSTOLIC: 2.5 CM
ECHO LV IVSD: 0.8 CM (ref 0.6–1)
ECHO LV MASS 2D: 71.9 G (ref 88–224)
ECHO LV MASS INDEX 2D: 37.4 G/M2 (ref 49–115)
ECHO LV POSTERIOR WALL DIASTOLIC: 0.8 CM (ref 0.6–1)
ECHO LV RELATIVE WALL THICKNESS RATIO: 0.47
ECHO LVOT AREA: 2.8 CM2
ECHO LVOT DIAM: 1.9 CM
ECHO LVOT PEAK GRADIENT: 2 MMHG
ECHO LVOT PEAK VELOCITY: 0.6 M/S
ECHO MV A VELOCITY: 0.46 M/S
ECHO MV E DECELERATION TIME (DT): 380.2 MS
ECHO MV E VELOCITY: 0.36 M/S
ECHO MV E/A RATIO: 0.78
ECHO MV E/E' LATERAL: 5.14
ECHO MV E/E' RATIO (AVERAGED): 6.17
ECHO MV E/E' SEPTAL: 7.2
ECHO PV MAX VELOCITY: 0.6 M/S
ECHO PV PEAK GRADIENT: 1 MMHG
ECHO RV FREE WALL PEAK S': 9 CM/S
ECHO RV INTERNAL DIMENSION: 5.1 CM
ECHO RV TAPSE: 1.2 CM (ref 1.5–2)
ECHO TV REGURGITANT MAX VELOCITY: 3.3 M/S
ECHO TV REGURGITANT PEAK GRADIENT: 44 MMHG
EOSINOPHIL # BLD: 0.1 K/UL (ref 0–0.4)
EOSINOPHIL NFR BLD: 1 % (ref 0–7)
ERYTHROCYTE [DISTWIDTH] IN BLOOD BY AUTOMATED COUNT: 14.8 % (ref 11.5–14.5)
FLUAV H1 2009 PAND RNA SPEC QL NAA+PROBE: NOT DETECTED
FLUAV H1 RNA SPEC QL NAA+PROBE: NOT DETECTED
FLUAV H3 RNA SPEC QL NAA+PROBE: NOT DETECTED
FLUAV SUBTYP SPEC NAA+PROBE: NOT DETECTED
FLUBV RNA SPEC QL NAA+PROBE: NOT DETECTED
GAS FLOW.O2 O2 DELIVERY SYS: ABNORMAL L/MIN
GLOBULIN SER CALC-MCNC: 3.4 G/DL (ref 2–4)
GLUCOSE SERPL-MCNC: 130 MG/DL (ref 65–100)
HADV DNA SPEC QL NAA+PROBE: NOT DETECTED
HCO3 BLD-SCNC: 44.3 MMOL/L (ref 22–26)
HCOV 229E RNA SPEC QL NAA+PROBE: NOT DETECTED
HCOV HKU1 RNA SPEC QL NAA+PROBE: NOT DETECTED
HCOV NL63 RNA SPEC QL NAA+PROBE: NOT DETECTED
HCOV OC43 RNA SPEC QL NAA+PROBE: NOT DETECTED
HCT VFR BLD AUTO: 57.3 % (ref 36.6–50.3)
HGB BLD-MCNC: 17.8 G/DL (ref 12.1–17)
HMPV RNA SPEC QL NAA+PROBE: NOT DETECTED
HPIV1 RNA SPEC QL NAA+PROBE: NOT DETECTED
HPIV2 RNA SPEC QL NAA+PROBE: NOT DETECTED
HPIV3 RNA SPEC QL NAA+PROBE: NOT DETECTED
HPIV4 RNA SPEC QL NAA+PROBE: NOT DETECTED
IMM GRANULOCYTES # BLD AUTO: 0.1 K/UL (ref 0–0.04)
IMM GRANULOCYTES NFR BLD AUTO: 0 % (ref 0–0.5)
LYMPHOCYTES # BLD: 0.9 K/UL (ref 0.8–3.5)
LYMPHOCYTES NFR BLD: 8 % (ref 12–49)
M PNEUMO DNA SPEC QL NAA+PROBE: NOT DETECTED
MCH RBC QN AUTO: 31.3 PG (ref 26–34)
MCHC RBC AUTO-ENTMCNC: 31.1 G/DL (ref 30–36.5)
MCV RBC AUTO: 100.7 FL (ref 80–99)
METHADONE UR QL: NEGATIVE
MONOCYTES # BLD: 0.7 K/UL (ref 0–1)
MONOCYTES NFR BLD: 6 % (ref 5–13)
NEUTS SEG # BLD: 10.1 K/UL (ref 1.8–8)
NEUTS SEG NFR BLD: 85 % (ref 32–75)
NRBC # BLD: 0 K/UL (ref 0–0.01)
NRBC BLD-RTO: 0 PER 100 WBC
O2/TOTAL GAS SETTING VFR VENT: 6 %
OPIATES UR QL: NEGATIVE
P-R INTERVAL, ECG05: 198 MS
PCO2 BLD: 110.9 MMHG (ref 35–45)
PCP UR QL: NEGATIVE
PH BLD: 7.21 [PH] (ref 7.35–7.45)
PLATELET # BLD AUTO: 272 K/UL (ref 150–400)
PMV BLD AUTO: 9.7 FL (ref 8.9–12.9)
PO2 BLD: 60 MMHG (ref 80–100)
POTASSIUM SERPL-SCNC: 4 MMOL/L (ref 3.5–5.1)
PROT SERPL-MCNC: 6.4 G/DL (ref 6.4–8.2)
Q-T INTERVAL, ECG07: 376 MS
QRS DURATION, ECG06: 86 MS
QTC CALCULATION (BEZET), ECG08: 462 MS
RBC # BLD AUTO: 5.69 M/UL (ref 4.1–5.7)
RSV RNA SPEC QL NAA+PROBE: NOT DETECTED
RV+EV RNA SPEC QL NAA+PROBE: NOT DETECTED
SAO2 % BLD: 81.7 % (ref 92–97)
SARS-COV-2 PCR, COVPCR: NOT DETECTED
SERVICE CMNT-IMP: ABNORMAL
SODIUM SERPL-SCNC: 141 MMOL/L (ref 136–145)
SPECIMEN TYPE: ABNORMAL
TROPONIN-HIGH SENSITIVITY: 11 NG/L (ref 0–76)
VENTRICULAR RATE, ECG03: 91 BPM
WBC # BLD AUTO: 12 K/UL (ref 4.1–11.1)

## 2021-12-23 PROCEDURE — 82803 BLOOD GASES ANY COMBINATION: CPT

## 2021-12-23 PROCEDURE — 97165 OT EVAL LOW COMPLEX 30 MIN: CPT | Performed by: OCCUPATIONAL THERAPIST

## 2021-12-23 PROCEDURE — 84484 ASSAY OF TROPONIN QUANT: CPT

## 2021-12-23 PROCEDURE — 36415 COLL VENOUS BLD VENIPUNCTURE: CPT

## 2021-12-23 PROCEDURE — 0202U NFCT DS 22 TRGT SARS-COV-2: CPT

## 2021-12-23 PROCEDURE — 74011250636 HC RX REV CODE- 250/636: Performed by: INTERNAL MEDICINE

## 2021-12-23 PROCEDURE — 65270000029 HC RM PRIVATE

## 2021-12-23 PROCEDURE — 36600 WITHDRAWAL OF ARTERIAL BLOOD: CPT

## 2021-12-23 PROCEDURE — 74011250637 HC RX REV CODE- 250/637: Performed by: INTERNAL MEDICINE

## 2021-12-23 PROCEDURE — 85025 COMPLETE CBC W/AUTO DIFF WBC: CPT

## 2021-12-23 PROCEDURE — 5A09557 ASSISTANCE WITH RESPIRATORY VENTILATION, GREATER THAN 96 CONSECUTIVE HOURS, CONTINUOUS POSITIVE AIRWAY PRESSURE: ICD-10-PCS | Performed by: INTERNAL MEDICINE

## 2021-12-23 PROCEDURE — APPSS30 APP SPLIT SHARED TIME 16-30 MINUTES: Performed by: NURSE PRACTITIONER

## 2021-12-23 PROCEDURE — 93306 TTE W/DOPPLER COMPLETE: CPT | Performed by: SPECIALIST

## 2021-12-23 PROCEDURE — 80307 DRUG TEST PRSMV CHEM ANLYZR: CPT

## 2021-12-23 PROCEDURE — 77010033678 HC OXYGEN DAILY

## 2021-12-23 PROCEDURE — 80053 COMPREHEN METABOLIC PANEL: CPT

## 2021-12-23 PROCEDURE — 99232 SBSQ HOSP IP/OBS MODERATE 35: CPT | Performed by: SPECIALIST

## 2021-12-23 PROCEDURE — 97162 PT EVAL MOD COMPLEX 30 MIN: CPT

## 2021-12-23 PROCEDURE — 94660 CPAP INITIATION&MGMT: CPT

## 2021-12-23 PROCEDURE — 93306 TTE W/DOPPLER COMPLETE: CPT

## 2021-12-23 PROCEDURE — 77030027138 HC INCENT SPIROMETER -A

## 2021-12-23 RX ORDER — LORAZEPAM 2 MG/ML
0.5 INJECTION INTRAMUSCULAR
Status: DISCONTINUED | OUTPATIENT
Start: 2021-12-23 | End: 2021-12-30 | Stop reason: HOSPADM

## 2021-12-23 RX ADMIN — Medication 10 ML: at 05:30

## 2021-12-23 RX ADMIN — CARVEDILOL 25 MG: 12.5 TABLET, FILM COATED ORAL at 08:06

## 2021-12-23 RX ADMIN — Medication 10 ML: at 14:00

## 2021-12-23 RX ADMIN — Medication 10 ML: at 22:39

## 2021-12-23 RX ADMIN — ENOXAPARIN SODIUM 40 MG: 100 INJECTION SUBCUTANEOUS at 08:06

## 2021-12-23 RX ADMIN — LISINOPRIL 5 MG: 5 TABLET ORAL at 08:06

## 2021-12-23 NOTE — ED NOTES
Bedside and Verbal shift change report given to Meño Mancilla RN (oncoming nurse) by ZACKARY Romero (offgoing nurse). Report included the following information SBAR, Kardex, ED Summary, Intake/Output, MAR and Recent Results.

## 2021-12-23 NOTE — ROUTINE PROCESS
Mri will not be done tonight. Spoke to nurse, mri tech who does cardiac exams is not here. Possibility it could get done tomorrow, but most likely will be done early next week.

## 2021-12-23 NOTE — PROGRESS NOTES
CARDIOLOGY PROGRESS NOTE        [address] [facility], [place], [address] [facility]  Phone [phone]; Fax [phone]          2021 9:11 AM       Admit Date:           2021  Admit Diagnosis:  Acute respiratory failure with hypoxia ([facility] [J96.01]  :          1965   MRN:          [id number]        Assessment/Plan  1. acute hypoxic resp failure: pulmonary to consult. 2.  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 Reports having severe hypotension after aggressive diuresis in the spring 2021. Hold diuresis. Long-term he would benefit from cardiac MRI of his possible with his prior thoracic fusion rods. In the interim recommend repeat echocardiogram w/ bubble to study to exclude ASD/VSD as etiology of cyanosis and hypoxia. ECHO with bubble study today. May need RHC w/ shunt run. 3.  Hx klippel- Feil Syndrome:                         We discussed the expected course, resolution and complications of the diagnosis(es) in detail. Medication risks, benefits, costs, interactions, and alternatives were discussed as indicated. No intake/output data recorded. Last 3 Recorded Weights in this Encounter    21 1134   Weight: 80.3 kg (177 lb)         No intake/output data recorded. SUBJECTIVE               Jamey Ogren reports dyspnea.       Current Facility-Administered Medications   Medication Dose Route Frequency    sodium chloride (NS) flush 5-40 mL  5-40 mL IntraVENous Q8H    sodium chloride (NS) flush 5-40 mL  5-40 mL IntraVENous PRN    acetaminophen (TYLENOL) tablet 650 mg  650 mg Oral Q6H PRN    Or    acetaminophen (TYLENOL) suppository 650 mg  650 mg Rectal Q6H PRN    polyethylene glycol (MIRALAX) packet 17 g  17 g Oral DAILY PRN    ondansetron (ZOFRAN ODT) tablet 4 mg  4 mg Oral Q8H PRN    Or    ondansetron (ZOFRAN) injection 4 mg  4 mg IntraVENous Q6H PRN    enoxaparin (LOVENOX) injection 40 mg  40 mg SubCUTAneous DAILY    carvediloL (COREG) tablet 25 mg 25 mg Oral BID WITH MEALS    lisinopriL (PRINIVIL, ZESTRIL) tablet 5 mg  5 mg Oral DAILY     Current Outpatient Medications   Medication Sig    carvediloL (COREG) 25 mg tablet Take 25 mg by mouth two (2) times daily (with meals).  furosemide (Lasix) 20 mg tablet Take 20 mg by mouth daily as needed.  lisinopriL (PRINIVIL, ZESTRIL) 5 mg tablet Take 5 mg by mouth daily.  ascorbic acid, vitamin C, (Vitamin C) 500 mg tablet Take 500 mg by mouth daily.  therapeutic multivitamin (THERAGRAN) tablet Take 1 Tablet by mouth daily.  cyanocobalamin (Vitamin B-12) 100 mcg tablet Take 100 mcg by mouth daily. OBJECTIVE             No intake or output data in the 24 hours ending 21 0911    Review of Systems - History obtained from the patient AS PER  HPI        PHYSICAL EXAM        Visit Vitals  /85   Pulse 95   Temp 98.1 °F (36.7 °C)   Resp 24   Ht 5' 7\" (1.702 m)   Wt 80.3 kg (177 lb)   SpO2 94%   BMI 27.72 kg/m²       Gen: Well-developed, well-nourished, in no acute distress  alert and oriented x 3  HEENT:  Pink conjunctivae, Hearing grossly normal.  No scleral icterus or conjunctival, moist mucous membranes  Neck: Supple,No JVD  Resp: No accessory muscle use, diminished bases   Card: Regular Rate, Rythm,Normal S1, S2, No murmurs, rubs or gallop.    MSK: No cyanosis or clubbing, good capillary refill  Skin: No rashes or ulcers, no bruising  Neuro:  Moving all four extremities, no focal deficit, follows commands appropriately  Psych:  Good insight, oriented to person, place and time, alert, Nml Affect  LE: + 1 LE edema       DATA REVIEW      Records received from [facility]  Echo 2016 ef 40-45%  Ef improved w CESIAT  Echo 2017 EF 55%  Normal nuclear stress test  2017     Brother  from CHF in his 45s   Father AA s/p repair at age 75 yo    Cardiac monitor: SR         Laboratory and Imaging have been reviewed by me and are notable for  No results for input(s): CPK, CKMB, TROIQ in the last 72 hours.   Recent Labs     12/23/21  0127 12/22/21  1312    141   K 4.0 4.1   CO2 38* 38*   BUN 28* 25*   CREA 1.12 1.18   * 136*   WBC 12.0* 9.7   HGB 17.8* 17.0   HCT 57.3* 53.4*    279             Juaquin Retana, NP

## 2021-12-23 NOTE — ED NOTES
Bedside and Verbal shift change report given to Monica Raygoza RN (oncoming nurse) by Jorge Smith RN (offgoing nurse).  Report included the following information SBAR, Kardex, ED Summary, Intake/Output, MAR and Recent Results. '

## 2021-12-23 NOTE — PROGRESS NOTES
TRANSFER - IN REPORT:    Verbal report received from [facility]) on Flora Scale  being received from ED(unit) for routine progression of care      Report consisted of patients Situation, Background, Assessment and   Recommendations(SBAR). Information from the following report(s) SBAR, Kardex, Accordion and Cardiac Rhythm NSR was reviewed with the receiving nurse. Opportunity for questions and clarification was provided. Assessment completed upon patients arrival to unit and care assumed. 1800: Given verbal orders by MD to hold Coreg until removal of BIPAP    This patient was assisted with Intentional Toileting every 2 hours during this shift as appropriate. Documentation of ambulation and output reflected on Flowsheet as appropriate. Purposeful hourly rounding was completed using AIDET and 5Ps. Outcomes of PHR documented as they occurred. Bed alarm in use as appropriate. Dual Suction and ambubag in place.

## 2021-12-23 NOTE — PROGRESS NOTES
Bedside and Verbal shift change report given to Fernando Parisi RN (oncoming nurse) by Corie Mendes. Frann Paget RN (offgoing nurse). Report included the following information SBAR, Kardex, Intake/Output, MAR, Recent Results and Cardiac Rhythm  .

## 2021-12-23 NOTE — PROGRESS NOTES
Problem: Mobility Impaired (Adult and Pediatric)  Goal: *Acute Goals and Plan of Care (Insert Text)  Description: FUNCTIONAL STATUS PRIOR TO ADMISSION: Patient was independent and active without use of DME.    HOME SUPPORT PRIOR TO ADMISSION: The patient lived with spouse and teenage children but did not require assist.    Physical Therapy Goals  Initiated 12/23/2021  All goals with intention of spO2 >88% on least restrictive supplemental oxygen or room air. 1.  Patient will move from supine to sit and sit to supine  in bed with independence within 7 day(s). 2.  Patient will transfer from bed to chair and chair to bed with independence using the least restrictive device within 7 day(s). 3.  Patient will perform sit to stand with independence within 7 day(s). 4.  Patient will ambulate with independence for 75 feet with the least restrictive device within 7 day(s). 5. Patient will demonstrate good safety awareness with all ambulation and transfers within 7 day(s)      Outcome: Not Met   PHYSICAL THERAPY EVALUATION  Patient: Flora Saini (42 y.o. male)  Date: 12/23/2021  Primary Diagnosis: Acute respiratory failure with hypoxia ([address] [J96.01]        Precautions:   Fall      ASSESSMENT  Based on the objective data described below, the patient presents with decreased safety awareness, ataxic gait, increased supplemental oxygen requirements including desaturation with activity, and functional mobility that is mildly reduced from his baseline in the setting of hospital admission for acute respiratory failure with hypoxia. Patient has ruled out from COVID-19 infection. PTA patient reports independence with ADLs and ambulation; denies history of falls. Patient with impulsive behavior during therapy evaluation, CGAx2 for supine > seated edge of bed transfer to prevent appearance of fall. Attempted to review pursed lip breathing techniques with patient stating \"I know I know\" and refusing to demonstrate.  Patient with poor pleth reading, improved by moving sensor to earlobe, patient saturating between 90-95% at edge of bed with good pleth on 6L midflow. Patient ambulates 25ft in room with close SBA and no AD. Patient with ataxic gait, widened base of support and truncal flexion. PT will continue to follow as patient allows with reduced frequency. Patient agreeable to this plan. Current Level of Function Impacting Discharge (mobility/balance): SBA    Functional Outcome Measure: The patient scored Total: 65/100 on the Barthel Index outcome measure which is indicative of being minimally independent in basic self-care. Other factors to consider for discharge: none additional     Patient will benefit from skilled therapy intervention to address the above noted impairments. PLAN :  Recommendations and Planned Interventions: transfer training, gait training, therapeutic exercises, patient and family training/education, and therapeutic activities      Frequency/Duration: Patient will be followed by physical therapy:  3 times a week to address goals. Recommendation for discharge: (in order for the patient to meet his/her long term goals)  No skilled physical therapy/ follow up rehabilitation needs identified at this time. This discharge recommendation:  Has not yet been discussed the attending provider and/or case management    IF patient discharges home will need the following DME: to be determined (TBD)         SUBJECTIVE:   Patient stated I know I know.  re: attempting to educate regarding PLBing technique    OBJECTIVE DATA SUMMARY:   HISTORY:    Past Medical History:   Diagnosis Date    Heart failure ([address]     Hypertension      Past Surgical History:   Procedure Laterality Date    HX ORTHOPAEDIC         Personal factors and/or comorbidities impacting plan of care: as above    Home Situation  Home Environment: Private residence  # Steps to Enter: 0  One/Two Story Residence: Two story  Living Alone: No  Support Systems: Spouse/Significant Other,Child(rupa) (teenagers)  Patient Expects to be Discharged to[de-identified] House  Current DME Used/Available at Home: Joby Chavez, straight,Walker, rolling,Shower chair,Grab bars  Tub or Shower Type: Shower    EXAMINATION/PRESENTATION/DECISION MAKING:   Critical Behavior:  Neurologic State: Alert  Orientation Level: Oriented X4  Cognition: Follows commands,Poor safety awareness  Safety/Judgement: Decreased awareness of need for assistance,Lack of insight into deficits,Awareness of environment  Hearing: Auditory  Auditory Impairment: None  Skin:  all observed intact  Edema: none apparent   Range Of Motion:  AROM: Within functional limits                       Strength:    Strength: Within functional limits                    Tone & Sensation:   Tone: Normal                              Coordination:  Coordination: Within functional limits  Vision:   Acuity:  (keeping eyes closed initially)  Functional Mobility:  Bed Mobility:     Supine to Sit: Modified independent  Sit to Supine: Modified independent  Scooting: Modified independent  Transfers:  Sit to Stand: Modified independent  Stand to Sit: Modified independent        Bed to Chair: Supervision              Balance:   Sitting: Intact  Standing: Impaired  Standing - Static: Good  Standing - Dynamic : Fair  Ambulation/Gait Training:  Distance (ft): 25 Feet (ft)     Ambulation - Level of Assistance: Stand-by assistance        Gait Abnormalities: Ataxic;Path deviations        Base of Support: Widened     Speed/Alma: Accelerated  Step Length: Left lengthened;Right lengthened             Functional Measure:  Barthel Index:    Bathin  Bladder: 10  Bowels: 10  Groomin  Dressing: 10  Feeding: 10  Mobility: 0  Stairs: 0  Toilet Use: 5  Transfer (Bed to Chair and Back): 15  Total: 65/100       The Barthel ADL Index: Guidelines  1. The index should be used as a record of what a patient does, not as a record of what a patient could do.   2. The main aim is to establish degree of independence from any help, physical or verbal, however minor and for whatever reason. 3. The need for supervision renders the patient not independent. 4. A patient's performance should be established using the best available evidence. Asking the patient, friends/relatives and nurses are the usual sources, but direct observation and common sense are also important. However direct testing is not needed. 5. Usually the patient's performance over the preceding 24-48 hours is important, but occasionally longer periods will be relevant. 6. Middle categories imply that the patient supplies over 50 per cent of the effort. 7. Use of aids to be independent is allowed. Score Interpretation (from [facility] 83)    Independent   60-79 Minimally independent   40-59 Partially dependent   20-39 Very dependent   <20 Totally dependent     -Usama Pimentel., Barthel, DRuthW. (1965). Functional evaluation: the Barthel Index. [address] [facility] ([address] (1997). The Barthel activities of daily living index: self-reporting versus actual performance in the old (> or = 75 years). Journal [address] 45(7), 14 [facility], J.LATISHA, Chiara Fletcher., [facility]. (1999). Measuring the change in disability after inpatient rehabilitation; comparison of the responsiveness of the Barthel Index and Functional [place] Measure. Journal of Neurology, Neurosurgery, and Psychiatry, 66(4), 220-521. Harley Olivera N.RAE.A, JANNET Batista.ANDREY, & Parmjit Long M.A. (2004) Assessment of post-stroke quality of life in cost-effectiveness studies: The usefulness of the Barthel Index and the EuroQoL-5D.  Quality of Life Research, 15, 831-11        Physical Therapy Evaluation Charge Determination   History Examination Presentation Decision-Making   MEDIUM  Complexity : 1-2 comorbidities / personal factors will impact the outcome/ POC  MEDIUM Complexity : 3 Standardized tests and measures addressing body structure, function, activity limitation and / or participation in recreation  MEDIUM Complexity : Evolving with changing characteristics  MEDIUM Complexity : FOTO score of 26-74      Based on the above components, the patient evaluation is determined to be of the following complexity level: MEDIUM    Pain Rating:  Patient without reports of pain during therapy    Activity Tolerance:   Fair, desaturates with exertion and requires oxygen, and requires rest breaks    After treatment patient left in no apparent distress:   Call bell within reach and patient seated at edge of bed per his insistence     COMMUNICATION/EDUCATION:   The patients plan of care was discussed with: Occupational therapist and Registered nurse. Fall prevention education was provided and the patient/caregiver indicated understanding. and Patient/family have participated as able in goal setting and plan of care.     Thank you for this referral.  Ziggy Noble PT, DPT   Time Calculation: 15 mins

## 2021-12-23 NOTE — PROGRESS NOTES
Patient unhooked self and ambulated to BR. Difficult to get patient back to room related to frustration and wants to use the bathroom. Oxygen at 69% on RA once back to room. Patient was oriented. Did talk about leaving but now sitting in bed calm. Charge nurse and MD notified. Patient back on 10 L mid flow. O2 @ 94%    Bedside commode in room. Patient refuses to use. Continues to get up and remove oxygen. ECHO at bedside.

## 2021-12-23 NOTE — NURSE NAVIGATOR
Chart reviewed by Heart Failure Nurse Navigator. Heart Failure database completed. EF:  Current Echo is pending. Last Echo 5/24/21 with EF 50%. ACEi/ARB/ARNi: Lisinopril 5 mg daily. BB: Coreg 25 mg BID. Aldosterone Antagonist: **    Obstructive Sleep Apnea Screening:   STOP-BANG score:   Referred to Sleep Medicine:     CRT **. NYHA Functional Class **. Heart Failure Teach Back in Patient Education. Heart Failure Avoiding Triggers on Discharge Instructions. Cardiologist: Dr He Monaco consulted upon admission. Patient did not follow up with Cardiology after last admission May 2021. Post discharge follow up phone call to be made within 48-72 hours of discharge.

## 2021-12-23 NOTE — PROGRESS NOTES
OCCUPATIONAL THERAPY EVALUATION/DISCHARGE  Patient: Patricia Turcios (78 y.o. male)  Date: 12/23/2021  Primary Diagnosis: Acute respiratory failure with hypoxia (H[facility]) [J96.01]       Precautions: fall       ASSESSMENT  Based on the objective data described below, the patient presents with decreased insight to current deficits, attempted to instruct in pursed lip breathing to increase sats > 89% and appeared to be holding breath/did not follow instruction. Received on 7 liters mid flow. Able to dress independently, mobility seems unsteady however likely at his baseline. At this time no further OT needs however as he is resistant to therapy/instructoin. Current Level of Function (ADLs/self-care): supervision to independent    Functional Outcome Measure: The patient scored 65/100 on the Barthel Index outcome measure which is indicative of minimally independent. Other factors to consider for discharge: ? Oxygen needs     PLAN :  Recommend with staff: Recommend with nursing patient to complete as able: OOB to chair 3x/day, ADLs with supervision and mobilizing to bedside commode for toileting. Thank you for your assistance. Recommendation for discharge: (in order for the patient to meet his/her long term goals)  No skilled occupational therapy/ follow up rehabilitation needs identified at this time.     This discharge recommendation:  Has not yet been discussed the attending provider and/or case management    IF patient discharges home will need the following DME: none       SUBJECTIVE:   Patient stated: nodded head when instructed on pursed lip breathing and appeared angry    OBJECTIVE DATA SUMMARY:   HISTORY:   Past Medical History:   Diagnosis Date    Heart failure ([address]     Hypertension      Past Surgical History:   Procedure Laterality Date    HX ORTHOPAEDIC         Prior Level of Function/Environment/Context: independent  Expanded or extensive additional review of patient history:   Home Situation  Home Environment: Private residence  # Steps to Enter: 0  One/Two Story Residence: Two story  Living Alone: No  Support Systems: Spouse/Significant Other,Child(rupa) (teenagers)  Patient Expects to be Discharged to[de-identified] House  Current DME Used/Available at Home: Cane, straight,Walker, rolling,Shower chair,Grab bars  Tub or Shower Type: Shower    Hand dominance:     EXAMINATION OF PERFORMANCE DEFICITS:  Cognitive/Behavioral Status:  Neurologic State: Alert  Orientation Level: Oriented X4  Cognition: Follows commands;Poor safety awareness  Perception: Appears intact  Perseveration: No perseveration noted  Safety/Judgement: Decreased awareness of need for assistance; Lack of insight into deficits; Awareness of environment    Skin: intact, noted scar from back sx    Edema: none    Hearing: Auditory  Auditory Impairment: None    Vision/Perceptual:                           Acuity:  (keeping eyes closed initially)         Range of Motion:  AROM: Within functional limits                         Strength:  Strength: Within functional limits                Coordination:  Coordination: Within functional limits  Fine Motor Skills-Upper: Left Intact; Right Intact    Gross Motor Skills-Upper: Left Intact; Right Intact    Tone & Sensation:  Tone: Normal                         Balance:  Sitting: Intact  Standing: Impaired  Standing - Static: Good  Standing - Dynamic : Fair    Functional Mobility and Transfers for ADLs:  Bed Mobility:  Supine to Sit: Modified independent  Sit to Supine: Modified independent  Scooting: Modified independent    Transfers:  Sit to Stand: Modified independent  Stand to Sit: Modified independent  Bed to Chair: Supervision  Bathroom Mobility: Supervision/set up  Toilet Transfer : Supervision    ADL Assessment:  Feeding: Independent    Oral Facial Hygiene/Grooming: Modified Independent         Upper Body Dressing: Independent    Lower Body Dressing: Modified independent    Toileting: Independent ADL Intervention and task modifications:     Educated on role of OT    Instructed on pursed lip breathing and patient nodded head in agreement, appeared to be holding breath versus exhaling     Patient instructed and indicated understanding the benefits of maintaining activity tolerance, functional mobility, and independence with self care tasks during acute stay  to ensure safe return home and to baseline. Encouraged patient to increase frequency and duration OOB, be out of bed for all meals, perform daily ADLs (as approved by RN/MD regarding bathing etc), and performing functional mobility to/from bathroom as oxygen tubing allows       Cognitive Retraining  Safety/Judgement: Decreased awareness of need for assistance; Lack of insight into deficits; Awareness of environment       Functional Measure:    Barthel Index:  Bathin  Bladder: 10  Bowels: 10  Groomin  Dressing: 10  Feeding: 10  Mobility: 0  Stairs: 0  Toilet Use: 5  Transfer (Bed to Chair and Back): 15  Total: 65/100      The Barthel ADL Index: Guidelines  1. The index should be used as a record of what a patient does, not as a record of what a patient could do. 2. The main aim is to establish degree of independence from any help, physical or verbal, however minor and for whatever reason. 3. The need for supervision renders the patient not independent. 4. A patient's performance should be established using the best available evidence. Asking the patient, friends/relatives and nurses are the usual sources, but direct observation and common sense are also important. However direct testing is not needed. 5. Usually the patient's performance over the preceding 24-48 hours is important, but occasionally longer periods will be relevant. 6. Middle categories imply that the patient supplies over 50 per cent of the effort. 7. Use of aids to be independent is allowed.     Score Interpretation (from [address] [facility] 83)    Independent   60-79 Minimally independent   40-59 Partially dependent   20-39 Very dependent   <20 Totally dependent     -Kerri Pimentel, Barthel, D.W. ([id number]). Functional evaluation: the Barthel Index. [address] [facility] ([address] (1997). The Barthel activities of daily living index: self-reporting versus actual performance in the old (> or = 75 years). Journal of [address] [place] [address] 45(7), 14 [facility], JCARLOS, Rissa Guy., Eric Antonio. (1999). Measuring the change in disability after inpatient rehabilitation; comparison of the responsiveness of the Barthel Index and Functional [place] Measure. Journal of Neurology, Neurosurgery, and Psychiatry, 66(4), 373-495. LILLIAM Shearer, MARYANN Batista, & Elyssa Cornejo M.A. (2004) Assessment of post-stroke quality of life in cost-effectiveness studies: The usefulness of the Barthel Index and the EuroQoL-5D. Quality of Life Research, 15, 842-74         Occupational Therapy Evaluation Charge Determination   History Examination Decision-Making   LOW Complexity : Brief history review  LOW Complexity : 1-3 performance deficits relating to physical, cognitive , or psychosocial skils that result in activity limitations and / or participation restrictions  MEDIUM Complexity : Patient may present with comorbidities that affect occupational performnce. Miniml to moderate modification of tasks or assistance (eg, physical or verbal ) with assesment(s) is necessary to enable patient to complete evaluation       Based on the above components, the patient evaluation is determined to be of the following complexity level: LOW   Pain Rating:  No complaint    Activity Tolerance:   Poor    After treatment patient left in no apparent distress:    Call bell within reach and seated edge of stretcher    COMMUNICATION/EDUCATION:   The patients plan of care was discussed with: Physical therapist and Registered nurse.      Thank you for this referral.  Carmelina Conway, OTR/L  Time Calculation: 13 mins

## 2021-12-23 NOTE — H&P
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 He notes worsening SOB over the past several months. He denies any significant change in mild LE edema. Denies change in weight. He has never smoked. Denies h/o lung disease. Denies cough or wheezing. Denies fevers. Does note intermittent chills. Pt states that he does have some snoring, but denies apnea. He does awaken refreshed, but does endorse some daytime somnolence. Past Medical History:   Diagnosis Date    Heart failure ([facility] [address]     Hypertension       Past Surgical History:   Procedure Laterality Date    HX ORTHOPAEDIC        Prior to Admission medications    Medication Sig Start Date End Date Taking? Authorizing Provider   carvediloL (COREG) 25 mg tablet Take 25 mg by mouth two (2) times daily (with meals). Yes Provider, Historical   furosemide (Lasix) 20 mg tablet Take 20 mg by mouth daily as needed. Yes Provider, Historical   lisinopriL (PRINIVIL, ZESTRIL) 5 mg tablet Take 5 mg by mouth daily. Yes Provider, Historical   ascorbic acid, vitamin C, (Vitamin C) 500 mg tablet Take 500 mg by mouth daily. Yes Provider, Historical   therapeutic multivitamin (THERAGRAN) tablet Take 1 Tablet by mouth daily. Yes Provider, Historical   cyanocobalamin (Vitamin B-12) 100 mcg tablet Take 100 mcg by mouth daily. Yes Provider, Historical     No Known Allergies   Social History     Tobacco Use    Smoking status: Never Smoker    Smokeless tobacco: Never Used   Substance Use Topics    Alcohol use: Yes     Comment: socially       Family History   Problem Relation Age of Onset    Heart Disease Brother         Review of Systems   Constitutional: Positive for chills. Negative for activity change, appetite change, fever and unexpected weight change.    HENT: Negative for congestion. Respiratory: Positive for shortness of breath. Negative for apnea, cough, chest tightness and wheezing. Cardiovascular: Positive for leg swelling. Negative for chest pain. Gastrointestinal: Negative for abdominal distention. Endocrine: Positive for cold intolerance. Genitourinary: Negative for difficulty urinating. Musculoskeletal: Positive for back pain. Allergic/Immunologic: Negative for environmental allergies. Neurological: Negative for dizziness. Hematological: Negative for adenopathy. Psychiatric/Behavioral: Negative for agitation. Objective:     Blood pressure 94/75, pulse 83, temperature 98 °F (36.7 °C), resp. rate 22, height 5' 7\" (1.702 m), weight 80.3 kg (177 lb 0.5 oz), SpO2 91 %. Temp (24hrs), Av.9 °F (36.6 °C), Min:97.7 °F (36.5 °C), Max:98.1 °F (36.7 °C)         07 -  1900  In: 425 [P.O.:425]  Out: -   No intake/output data recorded. Physical Exam  Constitutional:       Appearance: Normal appearance. He is normal weight. HENT:      Head: Normocephalic and atraumatic. Right Ear: External ear normal.      Left Ear: External ear normal.      Nose: Nose normal.      Mouth/Throat:      Mouth: Mucous membranes are dry. Eyes:      Extraocular Movements: Extraocular movements intact. Cardiovascular:      Rate and Rhythm: Normal rate and regular rhythm. Heart sounds: Normal heart sounds. Pulmonary:      Effort: Pulmonary effort is normal.      Breath sounds: Normal breath sounds. Comments: 6LNC  Musculoskeletal:      Cervical back: No tenderness. Right lower leg: Edema present. Left lower leg: Edema present. Skin:     General: Skin is warm and dry. Neurological:      Mental Status: He is alert.       Comments: Awake and alert   Psychiatric:         Mood and Affect: Mood normal.         Behavior: Behavior normal.       Data Review:   Recent Results (from the past 24 hour(s))   INFLUENZA A+B VIRAL AGS    Collection Time: 12/22/21  3:33 PM   Result Value Ref Range    Influenza A Antigen Negative NEG      Influenza B Antigen Negative NEG     BLOOD GAS, ARTERIAL    Collection Time: 12/22/21  4:26 PM   Result Value Ref Range    pH 7.26 (L) 7.35 - 7.45      PCO2 81 (H) 35 - 45 mmHg    PO2 49 (LL) 80 - 100 mmHg    O2 SAT 77 (L) 92 - 97 %    BICARBONATE 36 (H) 22 - 26 mmol/L    BASE EXCESS 5.6 mmol/L    O2 METHOD ROOM AIR      Sample source ARTERIAL      SITE LEFT RADIAL      LILLIAM'S TEST YES      Critical value read back Called to [address] [facility] on 12/22/2021 at 16:31    EKG, 12 LEAD, INITIAL    Collection Time: 12/22/21  6:11 PM   Result Value Ref Range    Ventricular Rate 91 BPM    Atrial Rate 91 BPM    P-R Interval 198 ms    QRS Duration 86 ms    Q-T Interval 376 ms    QTC Calculation (Bezet) 462 ms    Calculated P Axis 55 degrees    Calculated R Axis 111 degrees    Calculated T Axis 16 degrees    Diagnosis       Normal sinus rhythm  Biatrial enlargement  Left posterior fascicular block  Abnormal ECG  When compared with ECG of 24-MAY-2021 08:14,  No significant change was found  Confirmed by Giselle Valencia MD, NICOLASA ([id number]) on 12/23/2021 10:44:39 AM     TROPONIN-HIGH SENSITIVITY    Collection Time: 12/22/21  6:17 PM   Result Value Ref Range    Troponin-High Sensitivity 11 0 - 76 ng/L   CBC WITH AUTOMATED DIFF    Collection Time: 12/23/21  1:27 AM   Result Value Ref Range    WBC 12.0 (H) 4.1 - 11.1 K/uL    RBC 5.69 4.10 - 5.70 M/uL    HGB 17.8 (H) 12.1 - 17.0 g/dL    HCT 57.3 (H) 36.6 - 50.3 %    .7 (H) 80.0 - 99.0 FL    MCH 31.3 26.0 - 34.0 PG    MCHC 31.1 30.0 - 36.5 g/dL    RDW 14.8 (H) 11.5 - 14.5 %    PLATELET 073 927 - 544 K/uL    MPV 9.7 8.9 - 12.9 FL    NRBC 0.0 0  WBC    ABSOLUTE NRBC 0.00 0.00 - 0.01 K/uL    NEUTROPHILS 85 (H) 32 - 75 %    LYMPHOCYTES 8 (L) 12 - 49 %    MONOCYTES 6 5 - 13 %    EOSINOPHILS 1 0 - 7 %    BASOPHILS 0 0 - 1 %    IMMATURE GRANULOCYTES 0 0.0 - 0.5 %    ABS.  NEUTROPHILS 10.1 (H) 1.8 - 8.0 K/UL    ABS. LYMPHOCYTES 0.9 0.8 - 3.5 K/UL    ABS. MONOCYTES 0.7 0.0 - 1.0 K/UL    ABS. EOSINOPHILS 0.1 0.0 - 0.4 K/UL    ABS. BASOPHILS 0.0 0.0 - 0.1 K/UL    ABS. IMM. GRANS. 0.1 (H) 0.00 - 0.04 K/UL    DF AUTOMATED     METABOLIC PANEL, COMPREHENSIVE    Collection Time: 12/23/21  1:27 AM   Result Value Ref Range    Sodium 141 136 - 145 mmol/L    Potassium 4.0 3.5 - 5.1 mmol/L    Chloride 100 97 - 108 mmol/L    CO2 38 (H) 21 - 32 mmol/L    Anion gap 3 (L) 5 - 15 mmol/L    Glucose 130 (H) 65 - 100 mg/dL    BUN 28 (H) 6 - 20 MG/DL    Creatinine 1.12 0.70 - 1.30 MG/DL    BUN/Creatinine ratio 25 (H) 12 - 20      GFR est AA >60 >60 ml/min/1.73m2    GFR est non-AA >60 >60 ml/min/1.73m2    Calcium 7.8 (L) 8.5 - 10.1 MG/DL    Bilirubin, total 0.7 0.2 - 1.0 MG/DL    ALT (SGPT) 37 12 - 78 U/L    AST (SGOT) 26 15 - 37 U/L    Alk.  phosphatase 69 45 - 117 U/L    Protein, total 6.4 6.4 - 8.2 g/dL    Albumin 3.0 (L) 3.5 - 5.0 g/dL    Globulin 3.4 2.0 - 4.0 g/dL    A-G Ratio 0.9 (L) 1.1 - 2.2     RESPIRATORY VIRUS PANEL W/COVID-19, PCR    Collection Time: 12/23/21  6:26 AM    Specimen: Nasopharyngeal   Result Value Ref Range    Adenovirus Not detected NOTD      Coronavirus 229E Not detected NOTD      Coronavirus HKU1 Not detected NOTD      Coronavirus CVNL63 Not detected NOTD      Coronavirus OC43 Not detected NOTD      SARS-CoV-2, PCR Not detected NOTD      Metapneumovirus Not detected NOTD      Rhinovirus and Enterovirus Not detected NOTD      Influenza A Not detected NOTD      Influenza A, subtype H1 Not detected NOTD      Influenza A, subtype H3 Not detected NOTD      INFLUENZA A H1N1 PCR Not detected NOTD      Influenza B Not detected NOTD      Parainfluenza 1 Not detected NOTD      Parainfluenza 2 Not detected NOTD      Parainfluenza 3 Not detected NOTD      Parainfluenza virus 4 Not detected NOTD      RSV by PCR Not detected NOTD      B. parapertussis, PCR Not detected NOTD      Bordetella pertussis - PCR Not detected NOTD Chlamydophila pneumoniae DNA, QL, PCR Not detected NOTD      Mycoplasma pneumoniae DNA, QL, PCR Not detected NOTD     TROPONIN-HIGH SENSITIVITY    Collection Time: 12/23/21  8:04 AM   Result Value Ref Range    Troponin-High Sensitivity 11 0 - 76 ng/L   DRUG SCREEN, URINE    Collection Time: 12/23/21  8:05 AM   Result Value Ref Range    AMPHETAMINES Negative NEG      BARBITURATES Negative NEG      BENZODIAZEPINES Negative NEG      COCAINE Negative NEG      METHADONE Negative NEG      OPIATES Negative NEG      PCP(PHENCYCLIDINE) Negative NEG      THC (TH-CANNABINOL) Negative NEG      Drug screen comment (NOTE)    ECHO ADULT COMPLETE    Collection Time: 12/23/21 10:50 AM   Result Value Ref Range    IVSd 0.8 0.6 - 1.0 cm    LVIDd 3.4 (A) 4.2 - 5.9 cm    LVIDs 2.5 cm    LVOT Diameter 1.9 cm    LVPWd 0.8 0.6 - 1.0 cm    LVOT Peak Gradient 2 mmHg    LVOT Peak Velocity 0.6 m/s    RVIDd 5.1 cm    RV Free Wall Peak S' 9 cm/s    LA Diameter 2.7 cm    LA Volume A/L 21 mL    LA Volume 2C 17 (A) 18 - 58 mL    LA Volume 4C 16 (A) 18 - 58 mL    LA Volume BP 18 18 - 58 mL    LA Volume BP 18 18 - 58 mL    AV Area by Peak Velocity 2.1 cm2    AV Area by Peak Velocity 1.9 cm2    AV Area by Peak Velocity 2.4 cm2    AV Peak Gradient 3 mmHg    AV Peak Velocity 0.9 m/s    MV A Velocity 0.46 m/s    MV E Wave Deceleration Time 380.2 ms    MV E Velocity 0.36 m/s    LV E' Lateral Velocity 7 cm/s    LV E' Septal Velocity 5 cm/s    PV Peak Gradient 1 mmHg    PV Max Velocity 0.6 m/s    TAPSE 1.2 (A) 1.5 - 2.0 cm    TR Peak Gradient 44 mmHg    TR Max Velocity 3.30 m/s    Ascending Aorta 2.4 cm    Fractional Shortening 2D 26 28 - 44 %    LVIDd Index 1.77 cm/m2    LVIDs Index 1.30 cm/m2    LV RWT Ratio 0.47     LV Mass 2D 71.9 (A) 88 - 224 g    LV Mass 2D Index 37.4 (A) 49 - 115 g/m2    MV E/A 0.78     E/E' Ratio (Averaged) 6.17     E/E' Lateral 5.14     E/E' Septal 7.20     LA Volume Index A/L 11 16 - 34 mL/m2    LVOT Area 2.8 cm2    LA Volume Index 2C 9 (A) 16 - 34 mL/m2    LA Volume Index 4C 8 (A) 16 - 34 mL/m2    LA Size Index 1.41 cm/m2    Ascending Aorta Index 1.25 cm/m2    AV Velocity Ratio 0.67      CTA chest: FINDINGS:  THYROID: No nodule. MEDIASTINUM: No mass or lymphadenopathy. RACHEL: No mass or lymphadenopathy. THORACIC AORTA: No dissection or aneurysm. PULMONARY ARTERIES: Normal in caliber. The pulmonary arteries are well enhanced. No evidence of pulmonary embolus. TRACHEA/BRONCHI: Patent. ESOPHAGUS: No wall thickening or dilatation. HEART: Normal in size. PLEURA: Trace right pleural effusion. No left pleural effusion. LUNGS: There is mild bibasilar atelectasis. No nodule, mass, or airspace  disease. INCIDENTALLY IMAGED UPPER ABDOMEN: No focal abnormality. BONES: There is moderate dextroconvex scoliosis of the thoracic spine. A  Greco tania is in place.     IMPRESSION  1. Negative for pulmonary embolus. 2. Trace right pleural effusion with mild bibasilar atelectasis. .      Assessment:   Acute/Chronic respiratory failure with hypoxia and hypercapnia  Klippel-Feil syndrome s/p extensive back surgery  Mild Volume OL  H/o CHF  Suspected GABY      Plan:   Wean supplemental for goal sats >90%  Repeat ABG now  TTE with bubble pending   Further diuresis per cards  Suspect Klippel-Feil syndrome with underlying spinal deformities contributing to resp failure  Would likely benefit from NIPPV  OOB/IS  Recommend outpt PFTs/PSG    Thank you for consult. Will cont to follow.

## 2021-12-23 NOTE — PROGRESS NOTES
Charles [facility]  [address] [facility], [address] [place] [address]  [phone]      Medical Progress Note      NAME: Garret Gamboa   :  1965  MRM:  [id number]    Date/Time of service: 2021         Subjective:     Chief Complaint:  Patient was personally seen and examined by me during this time period. Chart reviewed. F/u acute resp failure. Increasing NC requirements. Continues to endorse dyspnea, no chest pain. Non compliant with o2 and bipap. Objective:       Vitals:       Last 24hrs VS reviewed since prior progress note.  Most recent are:    Visit Vitals  /85   Pulse 95   Temp 98.1 °F (36.7 °C)   Resp 24   Ht 5' 7\" (1.702 m)   Wt 80.3 kg (177 lb)   SpO2 94%   BMI 27.72 kg/m²     SpO2 Readings from Last 6 Encounters:   21 94%   21 93%    O2 Flow Rate (L/min): 10 l/min   No intake or output data in the 24 hours ending 21 0824     Exam:     Physical Exam:    Gen:  Well-developed, well-nourished, in no acute distress  HEENT:  Pink conjunctivae, PERRL, hearing intact to voice  Resp:  No accessory muscle use, clear breath sounds without wheezes rales or rhonchi  Card:  RRR, No murmurs, normal S1, S2, b/l peripheral edema  Abd:  Soft, non-tender, non-distended, normoactive bowel sounds are present  Musc:  No cyanosis or clubbing  Skin:  No rashes or ulcers, skin turgor is good  Neuro:  Cranial nerves 3-12 are grossly intact,  follows commands appropriately  Psych:  Oriented to person, place, and time, Alert with good insight      Medications Reviewed: (see below)    Lab Data Reviewed: (see below)    ______________________________________________________________________    Medications:     Current Facility-Administered Medications   Medication Dose Route Frequency    sodium chloride (NS) flush 5-40 mL  5-40 mL IntraVENous Q8H    sodium chloride (NS) flush 5-40 mL  5-40 mL IntraVENous PRN    acetaminophen (TYLENOL) tablet 650 mg  650 mg Oral Q6H PRN Or    acetaminophen (TYLENOL) suppository 650 mg  650 mg Rectal Q6H PRN    polyethylene glycol (MIRALAX) packet 17 g  17 g Oral DAILY PRN    ondansetron (ZOFRAN ODT) tablet 4 mg  4 mg Oral Q8H PRN    Or    ondansetron (ZOFRAN) injection 4 mg  4 mg IntraVENous Q6H PRN    enoxaparin (LOVENOX) injection 40 mg  40 mg SubCUTAneous DAILY    carvediloL (COREG) tablet 25 mg  25 mg Oral BID WITH MEALS    lisinopriL (PRINIVIL, ZESTRIL) tablet 5 mg  5 mg Oral DAILY     Current Outpatient Medications   Medication Sig    carvediloL (COREG) 25 mg tablet Take 25 mg by mouth two (2) times daily (with meals).  furosemide (Lasix) 20 mg tablet Take 20 mg by mouth daily as needed.  lisinopriL (PRINIVIL, ZESTRIL) 5 mg tablet Take 5 mg by mouth daily.  ascorbic acid, vitamin C, (Vitamin C) 500 mg tablet Take 500 mg by mouth daily.  therapeutic multivitamin (THERAGRAN) tablet Take 1 Tablet by mouth daily.  cyanocobalamin (Vitamin B-12) 100 mcg tablet Take 100 mcg by mouth daily. Lab Review:     Recent Labs     12/23/21  0127 12/22/21  1312   WBC 12.0* 9.7   HGB 17.8* 17.0   HCT 57.3* 53.4*    279     Recent Labs     12/23/21  0127 12/22/21  1312    141   K 4.0 4.1    99   CO2 38* 38*   * 136*   BUN 28* 25*   CREA 1.12 1.18   CA 7.8* 8.1*   ALB 3.0* 3.1*   TBILI 0.7 0.8   ALT 37 38     No results found for: GLUCPOC       Assessment / Plan:       Acute respiratory failure with hypoxia and hypercapnia (HCC) (12/22/2021)/ dyspnea: Unclear etiology cardiac verus pulmonary. CTA chest negative for PE. Respiratory viral panel. 12/22 ABG with hypoxia and hypercapnia; repeat pending. neg UDS. Currently on nasal cannula; wean as tolerated.      Acute on chronic heart failure ([address] (12/22/2021)/ Hx of cardiomyopathy: Unclear trigger. proBNP elevated to 844 but ct w/ trace effusions. troponins ok. echo w/ concern for shunt; obtian cardiac MRI. Holding further  IV diuretics.   Cardiology following.     LE swelling: suspect due to heart failure. Neg LE dopplers.      Hypertension: Continue home Coreg and lisinopril.     Total time spent with patient: 32 Minutes **I personally saw and examined the patient during this time period**                 Care Plan discussed with: Patient and Nursing Staff    Discussed:  Care Plan    Prophylaxis:  Lovenox    Disposition:  Home w/Family           ___________________________________________________    Attending Physician: Colby Booth DO

## 2021-12-24 LAB
ALBUMIN SERPL-MCNC: 2.8 G/DL (ref 3.5–5)
ALBUMIN/GLOB SERPL: 0.8 {RATIO} (ref 1.1–2.2)
ALP SERPL-CCNC: 67 U/L (ref 45–117)
ALT SERPL-CCNC: 34 U/L (ref 12–78)
ANION GAP SERPL CALC-SCNC: 1 MMOL/L (ref 5–15)
AST SERPL-CCNC: 19 U/L (ref 15–37)
BASOPHILS # BLD: 0 K/UL (ref 0–0.1)
BASOPHILS NFR BLD: 0 % (ref 0–1)
BILIRUB SERPL-MCNC: 1 MG/DL (ref 0.2–1)
BUN SERPL-MCNC: 29 MG/DL (ref 6–20)
BUN/CREAT SERPL: 32 (ref 12–20)
CALCIUM SERPL-MCNC: 8.1 MG/DL (ref 8.5–10.1)
CHLORIDE SERPL-SCNC: 96 MMOL/L (ref 97–108)
CO2 SERPL-SCNC: 42 MMOL/L (ref 21–32)
CREAT SERPL-MCNC: 0.91 MG/DL (ref 0.7–1.3)
DIFFERENTIAL METHOD BLD: ABNORMAL
EOSINOPHIL # BLD: 0.1 K/UL (ref 0–0.4)
EOSINOPHIL NFR BLD: 1 % (ref 0–7)
ERYTHROCYTE [DISTWIDTH] IN BLOOD BY AUTOMATED COUNT: 14.1 % (ref 11.5–14.5)
GLOBULIN SER CALC-MCNC: 3.3 G/DL (ref 2–4)
GLUCOSE SERPL-MCNC: 144 MG/DL (ref 65–100)
HCT VFR BLD AUTO: 53.1 % (ref 36.6–50.3)
HGB BLD-MCNC: 16.5 G/DL (ref 12.1–17)
IMM GRANULOCYTES # BLD AUTO: 0.1 K/UL (ref 0–0.04)
IMM GRANULOCYTES NFR BLD AUTO: 1 % (ref 0–0.5)
LYMPHOCYTES # BLD: 0.8 K/UL (ref 0.8–3.5)
LYMPHOCYTES NFR BLD: 8 % (ref 12–49)
MCH RBC QN AUTO: 31.9 PG (ref 26–34)
MCHC RBC AUTO-ENTMCNC: 31.1 G/DL (ref 30–36.5)
MCV RBC AUTO: 102.5 FL (ref 80–99)
MONOCYTES # BLD: 0.6 K/UL (ref 0–1)
MONOCYTES NFR BLD: 6 % (ref 5–13)
NEUTS SEG # BLD: 8.7 K/UL (ref 1.8–8)
NEUTS SEG NFR BLD: 84 % (ref 32–75)
NRBC # BLD: 0 K/UL (ref 0–0.01)
NRBC BLD-RTO: 0 PER 100 WBC
PLATELET # BLD AUTO: 239 K/UL (ref 150–400)
PMV BLD AUTO: 9.6 FL (ref 8.9–12.9)
POTASSIUM SERPL-SCNC: 4.4 MMOL/L (ref 3.5–5.1)
PROT SERPL-MCNC: 6.1 G/DL (ref 6.4–8.2)
RBC # BLD AUTO: 5.18 M/UL (ref 4.1–5.7)
RBC MORPH BLD: ABNORMAL
SODIUM SERPL-SCNC: 139 MMOL/L (ref 136–145)
WBC # BLD AUTO: 10.3 K/UL (ref 4.1–11.1)

## 2021-12-24 PROCEDURE — 65270000029 HC RM PRIVATE

## 2021-12-24 PROCEDURE — 74011250637 HC RX REV CODE- 250/637: Performed by: INTERNAL MEDICINE

## 2021-12-24 PROCEDURE — 85025 COMPLETE CBC W/AUTO DIFF WBC: CPT

## 2021-12-24 PROCEDURE — 36415 COLL VENOUS BLD VENIPUNCTURE: CPT

## 2021-12-24 PROCEDURE — 74011250636 HC RX REV CODE- 250/636: Performed by: INTERNAL MEDICINE

## 2021-12-24 PROCEDURE — 80053 COMPREHEN METABOLIC PANEL: CPT

## 2021-12-24 PROCEDURE — 77010033678 HC OXYGEN DAILY

## 2021-12-24 PROCEDURE — 99232 SBSQ HOSP IP/OBS MODERATE 35: CPT | Performed by: SPECIALIST

## 2021-12-24 PROCEDURE — 94660 CPAP INITIATION&MGMT: CPT

## 2021-12-24 PROCEDURE — APPSS30 APP SPLIT SHARED TIME 16-30 MINUTES: Performed by: NURSE PRACTITIONER

## 2021-12-24 RX ADMIN — Medication 10 ML: at 05:13

## 2021-12-24 RX ADMIN — CARVEDILOL 25 MG: 12.5 TABLET, FILM COATED ORAL at 17:47

## 2021-12-24 RX ADMIN — ENOXAPARIN SODIUM 40 MG: 100 INJECTION SUBCUTANEOUS at 09:11

## 2021-12-24 RX ADMIN — Medication 10 ML: at 17:47

## 2021-12-24 NOTE — PROGRESS NOTES
CARDIOLOGY PROGRESS NOTE        [address] [facility], [place], [address] [facility]  Phone [phone]; Fax [phone]          2021 9:11 AM       Admit Date:           2021  Admit Diagnosis:  Acute respiratory failure with hypoxia ([address] [J96.01]  :          1965   MRN:          [id number]        Assessment/Plan  1. Acute hypoxic resp failure: pulmonary following, now on CPAP FiO2 50% for hypercapnia   2. 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 Reports having severe hypotension after aggressive diuresis in the spring 2021. Holding diuresis. Long-term he would benefit from cardiac MRI of his possible with his prior thoracic fusion rods, will not be able to perform inpatient today - will have to set up next week or as outpatient (Dr. Meseret Waite not available). ECHO w/ bubble study revealing possible R-L shunt, study inadequate. May need RHC w/ shunt run. 3.  Hx klippel- Feil Syndrome                        We discussed the expected course, resolution and complications of the diagnosis(es) in detail. Medication risks, benefits, costs, interactions, and alternatives were discussed as indicated. No intake/output data recorded. Last 3 Recorded Weights in this Encounter    21 1134 21 1003 21 0438   Weight: 80.3 kg (177 lb) 80.3 kg (177 lb 0.5 oz) 80.5 kg (177 lb 6.4 oz)          1901 -  0700  In: 625 [P.O.:625]  Out: -     SUBJECTIVE               Candie Colunga reports breathing improved, continuously trying to remove his CPAP mask.  Does not have good insight into his disease process       Current Facility-Administered Medications   Medication Dose Route Frequency    LORazepam (ATIVAN) injection 0.5 mg  0.5 mg IntraVENous BID PRN    sodium chloride (NS) flush 5-40 mL  5-40 mL IntraVENous Q8H    sodium chloride (NS) flush 5-40 mL  5-40 mL IntraVENous PRN    acetaminophen (TYLENOL) tablet 650 mg  650 mg Oral Q6H PRN    Or    acetaminophen (TYLENOL) suppository 650 mg  650 mg Rectal Q6H PRN    polyethylene glycol (MIRALAX) packet 17 g  17 g Oral DAILY PRN    ondansetron (ZOFRAN ODT) tablet 4 mg  4 mg Oral Q8H PRN    Or    ondansetron (ZOFRAN) injection 4 mg  4 mg IntraVENous Q6H PRN    enoxaparin (LOVENOX) injection 40 mg  40 mg SubCUTAneous DAILY    carvediloL (COREG) tablet 25 mg  25 mg Oral BID WITH MEALS    lisinopriL (PRINIVIL, ZESTRIL) tablet 5 mg  5 mg Oral DAILY      OBJECTIVE               Intake/Output Summary (Last 24 hours) at 2021 0746  Last data filed at 2021 3241  Gross per 24 hour   Intake 625 ml   Output --   Net 625 ml       Review of Systems - History obtained from the patient AS PER  HPI        PHYSICAL EXAM        Visit Vitals  BP 95/78 (BP 1 Location: Right upper arm, BP Patient Position: At rest)   Pulse 78   Temp 98.1 °F (36.7 °C)   Resp 18   Ht 5' 7\" (1.702 m)   Wt 80.5 kg (177 lb 6.4 oz)   SpO2 98%   BMI 27.78 kg/m²       Gen: Well-developed, well-nourished, in no acute distress  alert and oriented x 3  HEENT:  Pink conjunctivae, Hearing grossly normal.  No scleral icterus or conjunctival, moist mucous membranes  Neck: Supple,No JVD  Resp: No accessory muscle use, diminished bases   Card: Regular Rate, Rythm,Normal S1, S2, No murmurs, rubs or gallop.    MSK: No cyanosis or clubbing, good capillary refill  Skin: No rashes or ulcers, no bruising  Neuro:  Moving all four extremities, no focal deficit, follows commands appropriately  Psych:  Poor insight, oriented to person, place and time, alert, Nml Affect  LE: + 1 LE edema       DATA REVIEW      Records received from [facility]  Echo 2016 ef 40-45%  Ef improved w GDMT  Echo 2017 EF 55%  Normal nuclear stress test  2017     Brother  from CHF in his 45s   Father AA s/p repair at age 75 yo    Cardiac monitor: SR         Laboratory and Imaging have been reviewed by me and are notable for  No results for input(s): CPK, CKMB, TROIQ in the last 67 hours.   Recent Labs     12/24/21  0025 12/23/21  0127 12/22/21  1312    141 141   K 4.4 4.0 4.1   CO2 42* 38* 38*   BUN 29* 28* 25*   CREA 0.91 1.12 1.18   * 130* 136*   WBC 10.3 12.0* 9.7   HGB 16.5 17.8* 17.0   HCT 53.1* 57.3* 53.4*    272 279             Terry Hickey, NP

## 2021-12-24 NOTE — ROUTINE PROCESS
Bedside and Verbal shift change report given to Sonya Damon (oncoming nurse) by Sloane Jeong (offgoing nurse). Report included the following information SBAR, Kardex, ED Summary, Procedure Summary, Intake/Output, MAR, Recent Results and Cardiac Rhythm NSR.

## 2021-12-24 NOTE — H&P
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 He notes worsening SOB over the past several months. He denies any significant change in mild LE edema. Denies change in weight. He has never smoked. Denies h/o lung disease. Denies cough or wheezing. Denies fevers. Does note intermittent chills. Pt states that he does have some snoring, but denies apnea. He does awaken refreshed, but does endorse some daytime somnolence. 12/24: No change in breathing. Currently on BIPAP and does not feel that it is helpful. Past Medical History:   Diagnosis Date    Heart failure ([facility] [address]     Hypertension       Past Surgical History:   Procedure Laterality Date    HX ORTHOPAEDIC        Prior to Admission medications    Medication Sig Start Date End Date Taking? Authorizing Provider   carvediloL (COREG) 25 mg tablet Take 25 mg by mouth two (2) times daily (with meals). Yes Provider, Historical   furosemide (Lasix) 20 mg tablet Take 20 mg by mouth daily as needed. Yes Provider, Historical   lisinopriL (PRINIVIL, ZESTRIL) 5 mg tablet Take 5 mg by mouth daily. Yes Provider, Historical   ascorbic acid, vitamin C, (Vitamin C) 500 mg tablet Take 500 mg by mouth daily. Yes Provider, Historical   therapeutic multivitamin (THERAGRAN) tablet Take 1 Tablet by mouth daily. Yes Provider, Historical   cyanocobalamin (Vitamin B-12) 100 mcg tablet Take 100 mcg by mouth daily. Yes Provider, Historical     No Known Allergies   Social History     Tobacco Use    Smoking status: Never Smoker    Smokeless tobacco: Never Used   Substance Use Topics    Alcohol use: Yes     Comment: socially       Family History   Problem Relation Age of Onset    Heart Disease Brother         Review of Systems   Constitutional: Positive for chills.  Negative for activity change, appetite change, fever and unexpected weight change. HENT: Negative for congestion. Respiratory: Positive for shortness of breath. Negative for apnea, cough, chest tightness and wheezing. Cardiovascular: Positive for leg swelling. Negative for chest pain. Gastrointestinal: Negative for abdominal distention. Endocrine: Positive for cold intolerance. Genitourinary: Negative for difficulty urinating. Musculoskeletal: Positive for back pain. Allergic/Immunologic: Negative for environmental allergies. Neurological: Negative for dizziness. Hematological: Negative for adenopathy. Psychiatric/Behavioral: Negative for agitation. Objective:     Blood pressure 118/85, pulse 83, temperature 96.8 °F (36 °C), resp. rate 18, height 5' 7\" (1.702 m), weight 80.5 kg (177 lb 6.4 oz), SpO2 98 %. Temp (24hrs), Av.9 °F (36.6 °C), Min:96.8 °F (36 °C), Max:98.9 °F (37.2 °C)        No intake/output data recorded.  1901 -  0700  In: 625 [P.O.:625]  Out: -     Physical Exam  Constitutional:       Appearance: Normal appearance. He is normal weight. HENT:      Head: Normocephalic and atraumatic. Right Ear: External ear normal.      Left Ear: External ear normal.      Nose: Nose normal.      Mouth/Throat:      Mouth: Mucous membranes are dry. Eyes:      Extraocular Movements: Extraocular movements intact. Cardiovascular:      Rate and Rhythm: Normal rate and regular rhythm. Heart sounds: Normal heart sounds. Pulmonary:      Effort: Pulmonary effort is normal.      Breath sounds: Normal breath sounds. Comments: 6LNC  Musculoskeletal:      Cervical back: No tenderness. Right lower leg: Edema present. Left lower leg: Edema present. Skin:     General: Skin is warm and dry. Neurological:      Mental Status: He is alert.       Comments: Awake and alert   Psychiatric:         Mood and Affect: Mood normal.         Behavior: Behavior normal.       Data Review: Recent Results (from the past 24 hour(s))   POC G3 - PUL    Collection Time: 12/23/21  3:35 PM   Result Value Ref Range    FIO2 (POC) 6 %    pH (POC) 7.21 (LL) 7.35 - 7.45      pCO2 (POC) 110.9 (H) 35.0 - 45.0 MMHG    pO2 (POC) 60 (L) 80 - 100 MMHG    HCO3 (POC) 44.3 (H) 22 - 26 MMOL/L    sO2 (POC) 81.7 (L) 92 - 97 %    Base excess (POC) 9.6 mmol/L    Site LEFT RADIAL      Device: NASAL CANNULA      Allens test (POC) Positive      Specimen type (POC) ARTERIAL      Critical value read back AKIN LOPEZ    CBC WITH AUTOMATED DIFF    Collection Time: 12/24/21 12:25 AM   Result Value Ref Range    WBC 10.3 4.1 - 11.1 K/uL    RBC 5.18 4.10 - 5.70 M/uL    HGB 16.5 12.1 - 17.0 g/dL    HCT 53.1 (H) 36.6 - 50.3 %    .5 (H) 80.0 - 99.0 FL    MCH 31.9 26.0 - 34.0 PG    MCHC 31.1 30.0 - 36.5 g/dL    RDW 14.1 11.5 - 14.5 %    PLATELET 293 170 - 840 K/uL    MPV 9.6 8.9 - 12.9 FL    NRBC 0.0 0  WBC    ABSOLUTE NRBC 0.00 0.00 - 0.01 K/uL    NEUTROPHILS 84 (H) 32 - 75 %    LYMPHOCYTES 8 (L) 12 - 49 %    MONOCYTES 6 5 - 13 %    EOSINOPHILS 1 0 - 7 %    BASOPHILS 0 0 - 1 %    IMMATURE GRANULOCYTES 1 (H) 0.0 - 0.5 %    ABS. NEUTROPHILS 8.7 (H) 1.8 - 8.0 K/UL    ABS. LYMPHOCYTES 0.8 0.8 - 3.5 K/UL    ABS. MONOCYTES 0.6 0.0 - 1.0 K/UL    ABS. EOSINOPHILS 0.1 0.0 - 0.4 K/UL    ABS. BASOPHILS 0.0 0.0 - 0.1 K/UL    ABS. IMM.  GRANS. 0.1 (H) 0.00 - 0.04 K/UL    DF SMEAR SCANNED      RBC COMMENTS NORMOCYTIC, NORMOCHROMIC     METABOLIC PANEL, COMPREHENSIVE    Collection Time: 12/24/21 12:25 AM   Result Value Ref Range    Sodium 139 136 - 145 mmol/L    Potassium 4.4 3.5 - 5.1 mmol/L    Chloride 96 (L) 97 - 108 mmol/L    CO2 42 (HH) 21 - 32 mmol/L    Anion gap 1 (L) 5 - 15 mmol/L    Glucose 144 (H) 65 - 100 mg/dL    BUN 29 (H) 6 - 20 MG/DL    Creatinine 0.91 0.70 - 1.30 MG/DL    BUN/Creatinine ratio 32 (H) 12 - 20      GFR est AA >60 >60 ml/min/1.73m2    GFR est non-AA >60 >60 ml/min/1.73m2    Calcium 8.1 (L) 8.5 - 10.1 MG/DL Bilirubin, total 1.0 0.2 - 1.0 MG/DL    ALT (SGPT) 34 12 - 78 U/L    AST (SGOT) 19 15 - 37 U/L    Alk. phosphatase 67 45 - 117 U/L    Protein, total 6.1 (L) 6.4 - 8.2 g/dL    Albumin 2.8 (L) 3.5 - 5.0 g/dL    Globulin 3.3 2.0 - 4.0 g/dL    A-G Ratio 0.8 (L) 1.1 - 2.2       CTA chest: FINDINGS:  THYROID: No nodule. MEDIASTINUM: No mass or lymphadenopathy. RACHEL: No mass or lymphadenopathy. THORACIC AORTA: No dissection or aneurysm. PULMONARY ARTERIES: Normal in caliber. The pulmonary arteries are well enhanced. No evidence of pulmonary embolus. TRACHEA/BRONCHI: Patent. ESOPHAGUS: No wall thickening or dilatation. HEART: Normal in size. PLEURA: Trace right pleural effusion. No left pleural effusion. LUNGS: There is mild bibasilar atelectasis. No nodule, mass, or airspace  disease. INCIDENTALLY IMAGED UPPER ABDOMEN: No focal abnormality. BONES: There is moderate dextroconvex scoliosis of the thoracic spine. A  Greco tania is in place.     IMPRESSION  1. Negative for pulmonary embolus. 2. Trace right pleural effusion with mild bibasilar atelectasis. .      Assessment:   Acute/Chronic respiratory failure with hypoxia and hypercapnia  Klippel-Feil syndrome s/p extensive back surgery  Mild Volume OL  H/o CHF  ? PFO on TTE  Suspected GABY      Plan:   Wean supplemental for goal sats >90%  Trial trilogy  Cardiac MRI pending   Further diuresis per cards  Suspect Klippel-Feil syndrome with underlying spinal deformities contributing to resp failure  OOB/IS  Recommend outpt PFTs/PSG

## 2021-12-24 NOTE — ROUTINE PROCESS
0130  Received critical lab result for C02: 43  Notified Dr. Sendy Lyman, no new orders received. Patient is on Bipap, notified Respiratory Therapy of critical lab value, no changes needed to bipap settings.

## 2021-12-24 NOTE — PROGRESS NOTES
Charles Jerry [facility]  [address] [facility], [address] [place] [address]  [phone]      Medical Progress Note      NAME: Nicolas Waters   :  1965  MRM:  [id number]    Date/Time of service: 2021         Subjective:     Chief Complaint:  Patient was personally seen and examined by me during this time period. Chart reviewed. F/u acute resp failure. On alternating bipap and NC. Denies dyspnea, no chest pain. Seems to be more compliant today. Objective:       Vitals:       Last 24hrs VS reviewed since prior progress note.  Most recent are:    Visit Vitals  /85 (BP 1 Location: Right upper arm, BP Patient Position: At rest)   Pulse 83   Temp 96.8 °F (36 °C)   Resp 18   Ht 5' 7\" (1.702 m)   Wt 80.5 kg (177 lb 6.4 oz)   SpO2 98%   BMI 27.78 kg/m²     SpO2 Readings from Last 6 Encounters:   21 98%   21 93%    O2 Flow Rate (L/min): 6 l/min       Intake/Output Summary (Last 24 hours) at 2021 1235  Last data filed at 2021 1449  Gross per 24 hour   Intake 325 ml   Output --   Net 325 ml        Exam:     Physical Exam:    Gen:  Well-developed, well-nourished, in no acute distress  HEENT:  Pink conjunctivae, PERRL, hearing intact to voice  Resp:  No accessory muscle use, clear breath sounds without wheezes rales or rhonchi  Card:  RRR, No murmurs, normal S1, S2, b/l peripheral edema  Abd:  Soft, non-tender, non-distended, normoactive bowel sounds are present  Musc:  No cyanosis or clubbing  Skin:  No rashes or ulcers, skin turgor is good  Neuro:  Cranial nerves 3-12 are grossly intact,  follows commands appropriately  Psych:  Oriented to person, place, and time, Alert with good insight      Medications Reviewed: (see below)    Lab Data Reviewed: (see below)    ______________________________________________________________________    Medications:     Current Facility-Administered Medications   Medication Dose Route Frequency    LORazepam (ATIVAN) injection 0.5 mg 0.5 mg IntraVENous BID PRN    sodium chloride (NS) flush 5-40 mL  5-40 mL IntraVENous Q8H    sodium chloride (NS) flush 5-40 mL  5-40 mL IntraVENous PRN    acetaminophen (TYLENOL) tablet 650 mg  650 mg Oral Q6H PRN    Or    acetaminophen (TYLENOL) suppository 650 mg  650 mg Rectal Q6H PRN    polyethylene glycol (MIRALAX) packet 17 g  17 g Oral DAILY PRN    ondansetron (ZOFRAN ODT) tablet 4 mg  4 mg Oral Q8H PRN    Or    ondansetron (ZOFRAN) injection 4 mg  4 mg IntraVENous Q6H PRN    enoxaparin (LOVENOX) injection 40 mg  40 mg SubCUTAneous DAILY    carvediloL (COREG) tablet 25 mg  25 mg Oral BID WITH MEALS    lisinopriL (PRINIVIL, ZESTRIL) tablet 5 mg  5 mg Oral DAILY          Lab Review:     Recent Labs     12/24/21  0025 12/23/21  0127 12/22/21  1312   WBC 10.3 12.0* 9.7   HGB 16.5 17.8* 17.0   HCT 53.1* 57.3* 53.4*    272 279     Recent Labs     12/24/21  0025 12/23/21  0127 12/22/21  1312    141 141   K 4.4 4.0 4.1   CL 96* 100 99   CO2 42* 38* 38*   * 130* 136*   BUN 29* 28* 25*   CREA 0.91 1.12 1.18   CA 8.1* 7.8* 8.1*   ALB 2.8* 3.0* 3.1*   TBILI 1.0 0.7 0.8   ALT 34 37 38     No results found for: GLUCPOC       Assessment / Plan:     Acute respiratory failure with hypoxia and hypercapnia (HCC) (12/22/2021)/ dyspnea: Unclear etiology cardiac verus pulmonary; 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 CTA chest negative for PE. Respiratory viral panel neg.  12/22 ABG with hypoxia and hypercapnia. neg UDS. Bipap; NC alertnating - plan for trial trilegy. Daily ABGS. Pulm following.       Acute on chronic heart failure ([address] (12/22/2021)/ Hx of cardiomyopathy: Unclear trigger. proBNP elevated to 844 but ct w/ trace effusions. troponins ok. echo w/ concern for shunt; obtian cardiac MRI when able. Holding further  IV diuretics. May need Right heart cath. Cardiology following.     LE swelling: suspect due to heart failure. Chas Vo.      Hypertension: Continue home Coreg and lisinopril.     Total time spent with patient: 32 Minutes **I personally saw and examined the patient during this time period**                 Care Plan discussed with: Patient and Nursing Staff    Discussed:  Care Plan    Prophylaxis:  Lovenox    Disposition:  Home w/Family           ___________________________________________________    Attending Physician: Kenya Foote DO

## 2021-12-24 NOTE — PROGRESS NOTES
0700: Bedside and Verbal shift change report given to Fern Gonzalez (oncoming nurse) by Sarbjit Escalrea (offgoing nurse). Report included the following information SBAR, ED Summary, Accordion and Cardiac Rhythm NSR.    1420: Attempted to page Pulmonology regarding patient's BIPAP order. 1603: Paged Pulmonology, second attempt. 1630: spoke with pulmonology. MD gave verbal orders for patient to use BIPAP when sleeping or when showing symptoms of High CO2. This patient was assisted with Intentional Toileting every 2 hours during this shift as appropriate. Documentation of ambulation and output reflected on Flowsheet as appropriate. Purposeful hourly rounding was completed using AIDET and 5Ps. Outcomes of PHR documented as they occurred. Bed alarm in use as appropriate. Dual Suction and ambubag in place.

## 2021-12-25 LAB
ALBUMIN SERPL-MCNC: 2.9 G/DL (ref 3.5–5)
ALBUMIN/GLOB SERPL: 0.9 {RATIO} (ref 1.1–2.2)
ALP SERPL-CCNC: 63 U/L (ref 45–117)
ALT SERPL-CCNC: 30 U/L (ref 12–78)
ANION GAP SERPL CALC-SCNC: ABNORMAL MMOL/L (ref 5–15)
AST SERPL-CCNC: 18 U/L (ref 15–37)
BASE EXCESS BLD CALC-SCNC: 12.8 MMOL/L
BASOPHILS # BLD: 0 K/UL (ref 0–0.1)
BASOPHILS NFR BLD: 0 % (ref 0–1)
BILIRUB SERPL-MCNC: 1 MG/DL (ref 0.2–1)
BUN SERPL-MCNC: 26 MG/DL (ref 6–20)
BUN/CREAT SERPL: 35 (ref 12–20)
CALCIUM SERPL-MCNC: 8.4 MG/DL (ref 8.5–10.1)
CHLORIDE SERPL-SCNC: 98 MMOL/L (ref 97–108)
CO2 SERPL-SCNC: 41 MMOL/L (ref 21–32)
CREAT SERPL-MCNC: 0.74 MG/DL (ref 0.7–1.3)
DIFFERENTIAL METHOD BLD: ABNORMAL
EOSINOPHIL # BLD: 0.1 K/UL (ref 0–0.4)
EOSINOPHIL NFR BLD: 2 % (ref 0–7)
ERYTHROCYTE [DISTWIDTH] IN BLOOD BY AUTOMATED COUNT: 14.1 % (ref 11.5–14.5)
GLOBULIN SER CALC-MCNC: 3.4 G/DL (ref 2–4)
GLUCOSE SERPL-MCNC: 111 MG/DL (ref 65–100)
HCO3 BLD-SCNC: 46.5 MMOL/L (ref 22–26)
HCT VFR BLD AUTO: 51 % (ref 36.6–50.3)
HGB BLD-MCNC: 16 G/DL (ref 12.1–17)
IMM GRANULOCYTES # BLD AUTO: 0 K/UL (ref 0–0.04)
IMM GRANULOCYTES NFR BLD AUTO: 0 % (ref 0–0.5)
LYMPHOCYTES # BLD: 0.8 K/UL (ref 0.8–3.5)
LYMPHOCYTES NFR BLD: 10 % (ref 12–49)
MCH RBC QN AUTO: 31.8 PG (ref 26–34)
MCHC RBC AUTO-ENTMCNC: 31.4 G/DL (ref 30–36.5)
MCV RBC AUTO: 101.4 FL (ref 80–99)
MONOCYTES # BLD: 0.7 K/UL (ref 0–1)
MONOCYTES NFR BLD: 8 % (ref 5–13)
NEUTS SEG # BLD: 6.4 K/UL (ref 1.8–8)
NEUTS SEG NFR BLD: 80 % (ref 32–75)
NRBC # BLD: 0 K/UL (ref 0–0.01)
NRBC BLD-RTO: 0 PER 100 WBC
O2/TOTAL GAS SETTING VFR VENT: 6 %
PCO2 BLD: 102.2 MMHG (ref 35–45)
PH BLD: 7.27 [PH] (ref 7.35–7.45)
PLATELET # BLD AUTO: 221 K/UL (ref 150–400)
PMV BLD AUTO: 9.8 FL (ref 8.9–12.9)
PO2 BLD: 85 MMHG (ref 80–100)
POTASSIUM SERPL-SCNC: 4.9 MMOL/L (ref 3.5–5.1)
PROT SERPL-MCNC: 6.3 G/DL (ref 6.4–8.2)
RBC # BLD AUTO: 5.03 M/UL (ref 4.1–5.7)
SAO2 % BLD: 93.5 % (ref 92–97)
SODIUM SERPL-SCNC: 138 MMOL/L (ref 136–145)
SPECIMEN TYPE: ABNORMAL
WBC # BLD AUTO: 8.1 K/UL (ref 4.1–11.1)

## 2021-12-25 PROCEDURE — 80053 COMPREHEN METABOLIC PANEL: CPT

## 2021-12-25 PROCEDURE — 99232 SBSQ HOSP IP/OBS MODERATE 35: CPT | Performed by: INTERNAL MEDICINE

## 2021-12-25 PROCEDURE — 82803 BLOOD GASES ANY COMBINATION: CPT

## 2021-12-25 PROCEDURE — 94760 N-INVAS EAR/PLS OXIMETRY 1: CPT

## 2021-12-25 PROCEDURE — 74011250636 HC RX REV CODE- 250/636: Performed by: INTERNAL MEDICINE

## 2021-12-25 PROCEDURE — 65270000029 HC RM PRIVATE

## 2021-12-25 PROCEDURE — 36415 COLL VENOUS BLD VENIPUNCTURE: CPT

## 2021-12-25 PROCEDURE — 94660 CPAP INITIATION&MGMT: CPT

## 2021-12-25 PROCEDURE — 74011250637 HC RX REV CODE- 250/637: Performed by: INTERNAL MEDICINE

## 2021-12-25 PROCEDURE — 77010033678 HC OXYGEN DAILY

## 2021-12-25 PROCEDURE — 85025 COMPLETE CBC W/AUTO DIFF WBC: CPT

## 2021-12-25 RX ORDER — LISINOPRIL 5 MG/1
2.5 TABLET ORAL DAILY
Status: DISCONTINUED | OUTPATIENT
Start: 2021-12-26 | End: 2021-12-25

## 2021-12-25 RX ORDER — LISINOPRIL 5 MG/1
2.5 TABLET ORAL DAILY
Status: DISCONTINUED | OUTPATIENT
Start: 2021-12-25 | End: 2021-12-28

## 2021-12-25 RX ADMIN — Medication 10 ML: at 06:29

## 2021-12-25 RX ADMIN — CARVEDILOL 25 MG: 12.5 TABLET, FILM COATED ORAL at 10:36

## 2021-12-25 RX ADMIN — Medication 10 ML: at 01:55

## 2021-12-25 RX ADMIN — CARVEDILOL 25 MG: 12.5 TABLET, FILM COATED ORAL at 17:42

## 2021-12-25 RX ADMIN — ENOXAPARIN SODIUM 40 MG: 100 INJECTION SUBCUTANEOUS at 08:35

## 2021-12-25 RX ADMIN — LISINOPRIL 2.5 MG: 5 TABLET ORAL at 10:35

## 2021-12-25 RX ADMIN — ACETAMINOPHEN 650 MG: 325 TABLET ORAL at 09:19

## 2021-12-25 RX ADMIN — Medication 10 ML: at 23:02

## 2021-12-25 NOTE — PROGRESS NOTES
Problem: Patient Education: Go to Patient Education Activity  Goal: Patient/Family Education  Outcome: Progressing Towards Goal     Problem: Patient Education: Go to Patient Education Activity  Goal: Patient/Family Education  Outcome: Progressing Towards Goal     Problem: Heart Failure: Day 1  Goal: Off Pathway (Use only if patient is Off Pathway)  Outcome: Progressing Towards Goal  Goal: Activity/Safety  Outcome: Progressing Towards Goal  Goal: Consults, if ordered  Outcome: Progressing Towards Goal  Goal: Diagnostic Test/Procedures  Outcome: Progressing Towards Goal  Goal: Nutrition/Diet  Outcome: Progressing Towards Goal  Goal: Discharge Planning  Outcome: Progressing Towards Goal  Goal: Medications  Outcome: Progressing Towards Goal  Goal: Respiratory  Outcome: Progressing Towards Goal  Goal: Treatments/Interventions/Procedures  Outcome: Progressing Towards Goal  Goal: Psychosocial  Outcome: Progressing Towards Goal  Goal: *Oxygen saturation within defined limits  Outcome: Progressing Towards Goal  Goal: *Hemodynamically stable  Outcome: Progressing Towards Goal  Goal: *Optimal pain control at patient's stated goal  Outcome: Progressing Towards Goal  Goal: *Anxiety reduced or absent  Outcome: Progressing Towards Goal     Problem: Heart Failure: Day 2  Goal: Off Pathway (Use only if patient is Off Pathway)  Outcome: Progressing Towards Goal  Goal: Activity/Safety  Outcome: Progressing Towards Goal  Goal: Consults, if ordered  Outcome: Progressing Towards Goal  Goal: Diagnostic Test/Procedures  Outcome: Progressing Towards Goal  Goal: Nutrition/Diet  Outcome: Progressing Towards Goal  Goal: Discharge Planning  Outcome: Progressing Towards Goal  Goal: Medications  Outcome: Progressing Towards Goal  Goal: Respiratory  Outcome: Progressing Towards Goal  Goal: Treatments/Interventions/Procedures  Outcome: Progressing Towards Goal  Goal: Psychosocial  Outcome: Progressing Towards Goal  Goal: *Oxygen saturation within defined limits  Outcome: Progressing Towards Goal  Goal: *Hemodynamically stable  Outcome: Progressing Towards Goal  Goal: *Optimal pain control at patient's stated goal  Outcome: Progressing Towards Goal  Goal: *Anxiety reduced or absent  Outcome: Progressing Towards Goal  Goal: *Demonstrates progressive activity  Outcome: Progressing Towards Goal     Problem: Heart Failure: Day 3  Goal: Off Pathway (Use only if patient is Off Pathway)  Outcome: Progressing Towards Goal  Goal: Activity/Safety  Outcome: Progressing Towards Goal  Goal: Diagnostic Test/Procedures  Outcome: Progressing Towards Goal  Goal: Nutrition/Diet  Outcome: Progressing Towards Goal  Goal: Discharge Planning  Outcome: Progressing Towards Goal  Goal: Medications  Outcome: Progressing Towards Goal  Goal: Respiratory  Outcome: Progressing Towards Goal  Goal: Treatments/Interventions/Procedures  Outcome: Progressing Towards Goal  Goal: Psychosocial  Outcome: Progressing Towards Goal  Goal: *Oxygen saturation within defined limits  Outcome: Progressing Towards Goal  Goal: *Hemodynamically stable  Outcome: Progressing Towards Goal  Goal: *Optimal pain control at patient's stated goal  Outcome: Progressing Towards Goal  Goal: *Anxiety reduced or absent  Outcome: Progressing Towards Goal  Goal: *Demonstrates progressive activity  Outcome: Progressing Towards Goal     Problem: Heart Failure: Day 4  Goal: Off Pathway (Use only if patient is Off Pathway)  Outcome: Progressing Towards Goal  Goal: Activity/Safety  Outcome: Progressing Towards Goal  Goal: Diagnostic Test/Procedures  Outcome: Progressing Towards Goal  Goal: Nutrition/Diet  Outcome: Progressing Towards Goal  Goal: Discharge Planning  Outcome: Progressing Towards Goal  Goal: Medications  Outcome: Progressing Towards Goal  Goal: Respiratory  Outcome: Progressing Towards Goal  Goal: Treatments/Interventions/Procedures  Outcome: Progressing Towards Goal  Goal: Psychosocial  Outcome: Progressing Towards Goal  Goal: *Oxygen saturation within defined limits  Outcome: Progressing Towards Goal  Goal: *Hemodynamically stable  Outcome: Progressing Towards Goal  Goal: *Optimal pain control at patient's stated goal  Outcome: Progressing Towards Goal  Goal: *Anxiety reduced or absent  Outcome: Progressing Towards Goal  Goal: *Demonstrates progressive activity  Outcome: Progressing Towards Goal     Problem: Heart Failure: Day 5  Goal: Off Pathway (Use only if patient is Off Pathway)  Outcome: Progressing Towards Goal  Goal: Activity/Safety  Outcome: Progressing Towards Goal  Goal: Diagnostic Test/Procedures  Outcome: Progressing Towards Goal  Goal: Nutrition/Diet  Outcome: Progressing Towards Goal  Goal: Discharge Planning  Outcome: Progressing Towards Goal  Goal: Medications  Outcome: Progressing Towards Goal  Goal: Respiratory  Outcome: Progressing Towards Goal  Goal: Treatments/Interventions/Procedures  Outcome: Progressing Towards Goal  Goal: Psychosocial  Outcome: Progressing Towards Goal     Problem: Heart Failure: Discharge Outcomes  Goal: *Demonstrates ability to perform prescribed activity without shortness of breath or discomfort  Outcome: Progressing Towards Goal  Goal: *Left ventricular function assessment completed prior to or during stay, or planned for post-discharge  Outcome: Progressing Towards Goal  Goal: *ACEI prescribed if LVEF less than 40% and no contraindications or ARB prescribed  Outcome: Progressing Towards Goal  Goal: *Verbalizes understanding and describes prescribed diet  Outcome: Progressing Towards Goal  Goal: *Verbalizes understanding/describes prescribed medications  Outcome: Progressing Towards Goal  Goal: *Describes available resources and support systems  Description: (eg: Home Health, Palliative Care, Advanced Medical Directive)  Outcome: Progressing Towards Goal  Goal: *Describes smoking cessation resources  Outcome: Progressing Towards Goal  Goal: *Understands and describes signs and symptoms to report to providers(Stroke Metric)  Outcome: Progressing Towards Goal  Goal: *Describes/verbalizes understanding of follow-up/return appt  Description: (eg: to physicians, diabetes treatment coordinator, and other resources  Outcome: Progressing Towards Goal  Goal: *Describes importance of continuing daily weights and changes to report to physician  Outcome: Progressing Towards Goal

## 2021-12-25 NOTE — PROGRESS NOTES
Charles Fayelsen [facility]  [address]  [phone]      Medical Progress Note      NAME: Teofilo Baez   :  1965  MRM:  [id number]    Date/Time of service: 2021         Subjective:     Chief Complaint:  Patient was personally seen and examined by me during this time period. Chart reviewed. F/u acute resp failure. On alternating bipap and NC. Some dyspnea with exertion, no chest pain. Spoke with wife via phone. Objective:       Vitals:       Last 24hrs VS reviewed since prior progress note.  Most recent are:    Visit Vitals  /76 (BP 1 Location: Left upper arm, BP Patient Position: At rest)   Pulse 80   Temp 97.2 °F (36.2 °C)   Resp 16   Ht 5' 7\" (1.702 m)   Wt 79 kg (174 lb 2.6 oz)   SpO2 96%   BMI 27.28 kg/m²     SpO2 Readings from Last 6 Encounters:   21 96%   21 93%    O2 Flow Rate (L/min): 5 l/min       Intake/Output Summary (Last 24 hours) at 2021 1122  Last data filed at 2021 0342  Gross per 24 hour   Intake 100 ml   Output --   Net 100 ml        Exam:     Physical Exam:    Gen:  Well-developed, well-nourished, in no acute distress  HEENT:  Pink conjunctivae, PERRL, hearing intact to voice  Resp:  No accessory muscle use, clear breath sounds without wheezes rales or rhonchi  Card:  RRR, No murmurs, normal S1, S2, b/l peripheral edema  Abd:  Soft, non-tender, non-distended, normoactive bowel sounds are present  Musc:  No cyanosis or clubbing  Skin:  No rashes or ulcers, skin turgor is good  Neuro:  Cranial nerves 3-12 are grossly intact,  follows commands appropriately  Psych:  Oriented to person, place, and time, Alert with good insight      Medications Reviewed: (see below)    Lab Data Reviewed: (see below)    ______________________________________________________________________    Medications:     Current Facility-Administered Medications   Medication Dose Route Frequency    lisinopriL (Kaley Rice) tablet 2.5 mg  2.5 mg Oral DAILY    LORazepam (ATIVAN) injection 0.5 mg  0.5 mg IntraVENous BID PRN    sodium chloride (NS) flush 5-40 mL  5-40 mL IntraVENous Q8H    sodium chloride (NS) flush 5-40 mL  5-40 mL IntraVENous PRN    acetaminophen (TYLENOL) tablet 650 mg  650 mg Oral Q6H PRN    Or    acetaminophen (TYLENOL) suppository 650 mg  650 mg Rectal Q6H PRN    polyethylene glycol (MIRALAX) packet 17 g  17 g Oral DAILY PRN    ondansetron (ZOFRAN ODT) tablet 4 mg  4 mg Oral Q8H PRN    Or    ondansetron (ZOFRAN) injection 4 mg  4 mg IntraVENous Q6H PRN    enoxaparin (LOVENOX) injection 40 mg  40 mg SubCUTAneous DAILY    carvediloL (COREG) tablet 25 mg  25 mg Oral BID WITH MEALS          Lab Review:     Recent Labs     12/25/21  0124 12/24/21  0025 12/23/21  0127   WBC 8.1 10.3 12.0*   HGB 16.0 16.5 17.8*   HCT 51.0* 53.1* 57.3*    239 272     Recent Labs     12/25/21  0124 12/24/21  0025 12/23/21  0127    139 141   K 4.9 4.4 4.0   CL 98 96* 100   CO2 41* 42* 38*   * 144* 130*   BUN 26* 29* 28*   CREA 0.74 0.91 1.12   CA 8.4* 8.1* 7.8*   ALB 2.9* 2.8* 3.0*   TBILI 1.0 1.0 0.7   ALT 30 34 37     No results found for: GLUCPOC       Assessment / Plan:     Acute respiratory failure with hypoxia and hypercapnia (HCC) (12/22/2021)/ dyspnea: Unclear etiology cardiac verus pulmonary; 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 CTA chest negative for PE. Respiratory viral panel neg.  12/22 ABG with hypoxia and hypercapnia. neg UDS. Bipap; NC alertnating. possible trilegy OP. Daily ABGS. Pulm following.       Acute on chronic heart failure ([address] (12/22/2021)/ Hx of cardiomyopathy: Unclear trigger. proBNP elevated to 844 but ct w/ trace effusions. troponins ok. echo w/ concern for shunt; obtian cardiac MRI when able. Holding further  IV diuretics. May need Right heart cath.  Cardiology following.     LE swelling: suspect due to heart failure. Neg LE dopplers.      Hypertension: Continue home Coreg; reduce lisinopril dose due to borderline BPs.     Total time spent with patient: 32 Minutes **I personally saw and examined the patient during this time period**                 Care Plan discussed with: Patient and Nursing Staff    Discussed:  Care Plan    Prophylaxis:  Lovenox    Disposition:  Home w/Family           ___________________________________________________    Attending Physician: Kristen Sosa DO

## 2021-12-25 NOTE — PROGRESS NOTES
Cardiology Daily Progress Note      Sayda Delaney is a 49 y.o. male with congenital cardiac illness and GABY is admitted with SOB and hypoxia. Feeling better. Does not really answer any questions.      Visit Vitals  /82 (BP 1 Location: Right upper arm, BP Patient Position: Semi fowlers)   Pulse 98   Temp 98.2 °F (36.8 °C)   Resp 16   Ht 5' 7\" (1.702 m)   Wt 174 lb 2.6 oz (79 kg)   SpO2 90%   BMI 27.28 kg/m²       Intake/Output Summary (Last 24 hours) at 12/25/2021 1338  Last data filed at 12/25/2021 0342  Gross per 24 hour   Intake 100 ml   Output --   Net 100 ml     General appearance - alert, well appearing, and in no distress  Mental status - affect appropriate to mood  Eyes - sclera anicteric, moist mucous membranes  Neck - supple, no significant adenopathy  Chest - clear to auscultation, no wheezes, rales or rhonchi  Heart - normal rate, regular rhythm, normal S1, S2, no murmurs, rubs, clicks or gallops  Abdomen - soft, nontender, nondistended, no masses or organomegaly  Extremities - peripheral pulses normal, no pedal edema    Current Facility-Administered Medications   Medication Dose Route Frequency    lisinopriL (PRINIVIL, ZESTRIL) tablet 2.5 mg  2.5 mg Oral DAILY    LORazepam (ATIVAN) injection 0.5 mg  0.5 mg IntraVENous BID PRN    sodium chloride (NS) flush 5-40 mL  5-40 mL IntraVENous Q8H    sodium chloride (NS) flush 5-40 mL  5-40 mL IntraVENous PRN    acetaminophen (TYLENOL) tablet 650 mg  650 mg Oral Q6H PRN    Or    acetaminophen (TYLENOL) suppository 650 mg  650 mg Rectal Q6H PRN    polyethylene glycol (MIRALAX) packet 17 g  17 g Oral DAILY PRN    ondansetron (ZOFRAN ODT) tablet 4 mg  4 mg Oral Q8H PRN    Or    ondansetron (ZOFRAN) injection 4 mg  4 mg IntraVENous Q6H PRN    enoxaparin (LOVENOX) injection 40 mg  40 mg SubCUTAneous DAILY    carvediloL (COREG) tablet 25 mg  25 mg Oral BID WITH MEALS        Records received from [facility]  Echo 7/2016 ef 40-45%  Ef improved w GDMT  Echo 2017 EF 55%  Normal nuclear stress test  2017     Brother  from CHF in his 45s   Father AA s/p repair at age 75 yo    Assessment:    1. Biventricular dysfunction right greater than left  2. Hypoxic respiratory failure  3. 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 Bubble study performed was poor quality however bubbles were seen across from the right to the left side. Right atrium appears to be mildly dilated left ventricular function is 55 to 60%  5. The cardiac MRI will need to be done as an outpatient. This cannot be done during this hospitalization  6. We will continue to look for other etiologies of hypoxia and if nothing from a pulmonary standpoint is determined then may need to do a right heart catheterization with a saturation run as OP.            ___________________________________________________    Xiomara Shelton.  Angelita Arora MD, [facility] [place]

## 2021-12-25 NOTE — PROGRESS NOTES
0700: Bedside and Verbal shift change report given to Fern Gonzalez (oncoming nurse) by Hoa Ybarra (offgoing nurse). Report included the following information SBAR, Kardex, Accordion and Cardiac Rhythm NSR. This patient was assisted with Intentional Toileting every 2 hours during this shift as appropriate. Documentation of ambulation and output reflected on Flowsheet as appropriate. Purposeful hourly rounding was completed using AIDET and 5Ps. Outcomes of PHR documented as they occurred. Bed alarm in use as appropriate. Dual Suction and ambubag in place.

## 2021-12-25 NOTE — ROUTINE PROCESS
Bedside and Verbal shift change report given to Obdulio Carmona (oncoming nurse) by Sher Gaona (offgoing nurse). Report included the following information SBAR, Kardex, ED Summary, Procedure Summary, Intake/Output, MAR, Recent Results and Cardiac Rhythm NSR.

## 2021-12-26 LAB
ANION GAP SERPL CALC-SCNC: 5 MMOL/L (ref 5–15)
ARTERIAL PATENCY WRIST A: YES
ARTERIAL PATENCY WRIST A: YES
BASE EXCESS BLDA CALC-SCNC: 14.4 MMOL/L
BASE EXCESS BLDA CALC-SCNC: 14.4 MMOL/L
BASOPHILS # BLD: 0 K/UL (ref 0–0.1)
BASOPHILS NFR BLD: 0 % (ref 0–1)
BDY SITE: ABNORMAL
BDY SITE: ABNORMAL
BUN SERPL-MCNC: 18 MG/DL (ref 6–20)
BUN/CREAT SERPL: 19 (ref 12–20)
CALCIUM SERPL-MCNC: 8.5 MG/DL (ref 8.5–10.1)
CHLORIDE SERPL-SCNC: 92 MMOL/L (ref 97–108)
CO2 SERPL-SCNC: 43 MMOL/L (ref 21–32)
CREAT SERPL-MCNC: 0.93 MG/DL (ref 0.7–1.3)
DIFFERENTIAL METHOD BLD: ABNORMAL
EOSINOPHIL # BLD: 0.2 K/UL (ref 0–0.4)
EOSINOPHIL NFR BLD: 3 % (ref 0–7)
ERYTHROCYTE [DISTWIDTH] IN BLOOD BY AUTOMATED COUNT: 13.8 % (ref 11.5–14.5)
FIO2 ON VENT: 50 %
FIO2 ON VENT: 50 %
GAS FLOW.O2 SETTING OXYMISER: 16 L/MIN
GAS FLOW.O2 SETTING OXYMISER: 16 L/MIN
GLUCOSE SERPL-MCNC: 174 MG/DL (ref 65–100)
HCO3 BLDA-SCNC: 45 MMOL/L (ref 22–26)
HCO3 BLDA-SCNC: 45 MMOL/L (ref 22–26)
HCT VFR BLD AUTO: 50.4 % (ref 36.6–50.3)
HGB BLD-MCNC: 16 G/DL (ref 12.1–17)
IMM GRANULOCYTES # BLD AUTO: 0 K/UL
IMM GRANULOCYTES NFR BLD AUTO: 0 %
IPAP/PIP, IPAPIP: 18
IPAP/PIP, IPAPIP: 18
LYMPHOCYTES # BLD: 0.5 K/UL (ref 0.8–3.5)
LYMPHOCYTES NFR BLD: 6 % (ref 12–49)
MCH RBC QN AUTO: 32.1 PG (ref 26–34)
MCHC RBC AUTO-ENTMCNC: 31.7 G/DL (ref 30–36.5)
MCV RBC AUTO: 101 FL (ref 80–99)
MONOCYTES # BLD: 0.6 K/UL (ref 0–1)
MONOCYTES NFR BLD: 8 % (ref 5–13)
NEUTS BAND NFR BLD MANUAL: 4 % (ref 0–6)
NEUTS SEG # BLD: 6.6 K/UL (ref 1.8–8)
NEUTS SEG NFR BLD: 79 % (ref 32–75)
NRBC # BLD: 0 K/UL (ref 0–0.01)
NRBC BLD-RTO: 0 PER 100 WBC
PCO2 BLDA: 84 MMHG (ref 35–45)
PCO2 BLDA: 84 MMHG (ref 35–45)
PEEP RESPIRATORY: 8 CM[H2O]
PEEP RESPIRATORY: 8 CM[H2O]
PH BLDA: 7.34 [PH] (ref 7.35–7.45)
PH BLDA: 7.34 [PH] (ref 7.35–7.45)
PLATELET # BLD AUTO: 234 K/UL (ref 150–400)
PMV BLD AUTO: 9.7 FL (ref 8.9–12.9)
PO2 BLDA: 104 MMHG (ref 80–100)
PO2 BLDA: 104 MMHG (ref 80–100)
POTASSIUM SERPL-SCNC: 5 MMOL/L (ref 3.5–5.1)
RBC # BLD AUTO: 4.99 M/UL (ref 4.1–5.7)
RBC MORPH BLD: ABNORMAL
SAO2 % BLD: 97 % (ref 92–97)
SAO2 % BLD: 97 % (ref 92–97)
SAO2% DEVICE SAO2% SENSOR NAME: ABNORMAL
SAO2% DEVICE SAO2% SENSOR NAME: ABNORMAL
SODIUM SERPL-SCNC: 140 MMOL/L (ref 136–145)
SPECIMEN SITE: ABNORMAL
SPECIMEN SITE: ABNORMAL
WBC # BLD AUTO: 7.9 K/UL (ref 4.1–11.1)
WBC MORPH BLD: ABNORMAL

## 2021-12-26 PROCEDURE — 36415 COLL VENOUS BLD VENIPUNCTURE: CPT

## 2021-12-26 PROCEDURE — 74011250636 HC RX REV CODE- 250/636: Performed by: INTERNAL MEDICINE

## 2021-12-26 PROCEDURE — 85025 COMPLETE CBC W/AUTO DIFF WBC: CPT

## 2021-12-26 PROCEDURE — 36600 WITHDRAWAL OF ARTERIAL BLOOD: CPT

## 2021-12-26 PROCEDURE — 82803 BLOOD GASES ANY COMBINATION: CPT

## 2021-12-26 PROCEDURE — 65270000029 HC RM PRIVATE

## 2021-12-26 PROCEDURE — 74011250637 HC RX REV CODE- 250/637: Performed by: INTERNAL MEDICINE

## 2021-12-26 PROCEDURE — 99232 SBSQ HOSP IP/OBS MODERATE 35: CPT | Performed by: INTERNAL MEDICINE

## 2021-12-26 PROCEDURE — 80048 BASIC METABOLIC PNL TOTAL CA: CPT

## 2021-12-26 PROCEDURE — 94660 CPAP INITIATION&MGMT: CPT

## 2021-12-26 RX ADMIN — CARVEDILOL 25 MG: 12.5 TABLET, FILM COATED ORAL at 16:13

## 2021-12-26 RX ADMIN — Medication 10 ML: at 16:13

## 2021-12-26 RX ADMIN — ENOXAPARIN SODIUM 40 MG: 100 INJECTION SUBCUTANEOUS at 09:46

## 2021-12-26 RX ADMIN — Medication 10 ML: at 23:53

## 2021-12-26 RX ADMIN — LISINOPRIL 2.5 MG: 5 TABLET ORAL at 09:46

## 2021-12-26 RX ADMIN — CARVEDILOL 25 MG: 12.5 TABLET, FILM COATED ORAL at 09:45

## 2021-12-26 NOTE — PROGRESS NOTES
Cardiology Daily Progress Note      Grace Grover is a 49 y.o. male with congenital cardiac illness and GABY is admitted with SOB and hypoxia. Feeling better. Does not really answer any questions.      Visit Vitals  /67 (BP 1 Location: Right upper arm, BP Patient Position: Sitting)   Pulse 92   Temp 98.6 °F (37 °C)   Resp 18   Ht 5' 7\" (1.702 m)   Wt 177 lb 4 oz (80.4 kg)   SpO2 94%   BMI 27.76 kg/m²     No intake or output data in the 24 hours ending 21 1542  General appearance - alert, well appearing, and in no distress  Mental status - affect appropriate to mood  Eyes - sclera anicteric, moist mucous membranes  Neck - supple, no significant adenopathy  Chest - clear to auscultation, no wheezes, rales or rhonchi  Heart - normal rate, regular rhythm, normal S1, S2, no murmurs, rubs, clicks or gallops  Abdomen - soft, nontender, nondistended, no masses or organomegaly  Extremities - peripheral pulses normal, no pedal edema    Current Facility-Administered Medications   Medication Dose Route Frequency    lisinopriL (PRINIVIL, ZESTRIL) tablet 2.5 mg  2.5 mg Oral DAILY    LORazepam (ATIVAN) injection 0.5 mg  0.5 mg IntraVENous BID PRN    sodium chloride (NS) flush 5-40 mL  5-40 mL IntraVENous Q8H    sodium chloride (NS) flush 5-40 mL  5-40 mL IntraVENous PRN    acetaminophen (TYLENOL) tablet 650 mg  650 mg Oral Q6H PRN    Or    acetaminophen (TYLENOL) suppository 650 mg  650 mg Rectal Q6H PRN    polyethylene glycol (MIRALAX) packet 17 g  17 g Oral DAILY PRN    ondansetron (ZOFRAN ODT) tablet 4 mg  4 mg Oral Q8H PRN    Or    ondansetron (ZOFRAN) injection 4 mg  4 mg IntraVENous Q6H PRN    enoxaparin (LOVENOX) injection 40 mg  40 mg SubCUTAneous DAILY    carvediloL (COREG) tablet 25 mg  25 mg Oral BID WITH MEALS        Records received from [facility]  Echo 2016 ef 40-45%  Ef improved w GDMT  Echo 2017 EF 55%  Normal nuclear stress test  2017     Brother  from CHF in his 45s Father AA s/p repair at age 75 yo    Assessment:    1. Biventricular dysfunction right greater than left on prior Echo of 2016. 2. Hypoxic respiratory failure  3. 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 Bubble study performed was poor quality however bubbles were seen across from the right to the left side. Right atrium appears to be mildly dilated left ventricular function is 55 to 60%. However mild right to left shunting does not explain hypoxia and SOB. 5. The cardiac MRI will need to be done as an outpatient. This cannot be done during this hospitalization as patient is on O2 and will not be able to hold repeated breatholds. 6. Will proceed with RHC tomorrow. Discussed with his wife at length on phone.            ___________________________________________________    Ursula De La Paz.  Jen Aguirre MD, [facility] [place]

## 2021-12-26 NOTE — PROGRESS NOTES
Bedside and Verbal shift change report given to ZACKARY Andrew (oncoming nurse) by Baldev Donaldson (offgoing nurse). Report included the following information SBAR, Kardex, ED Summary, MAR, Recent Results and Cardiac Rhythm SR. This patient was assisted with Intentional Toileting every 2 hours during this shift as appropriate. Documentation of ambulation and output reflected on Flowsheet as appropriate. Purposeful hourly rounding was completed using AIDET and 5Ps. Outcomes of PHR documented as they occurred. Bed alarm in use as appropriate. Dual Suction and ambubag in place. Bedside and Verbal shift change report given to Dave Bravo RN (oncoming nurse) by Bernardino Espinosa (offgoing nurse).  Report included the following information SBAR, Kardex, ED Summary, MAR, Recent Results and Cardiac Rhythm SR.

## 2021-12-26 NOTE — ROUTINE PROCESS
Bedside and Verbal shift change report given to [address] [place] [address] (oncoming nurse) by Almita Gaona (offgoing nurse). Report included the following information SBAR, Kardex, ED Summary, Procedure Summary, MAR, Recent Results and Cardiac Rhythm NSR.

## 2021-12-26 NOTE — PROGRESS NOTES
[facility]  [address] [facility], [address] [place] [address]  [phone]      Medical Progress Note      NAME: Patricia Turcios   :  1965  MRM:  [id number]    Date/Time of service: 2021         Subjective:     Chief Complaint:  Patient was personally seen and examined by me during this time period. Chart reviewed. F/u acute resp failure. On alternating bipap and NC. Some dyspnea with exertion, no chest pain. Spoke with wife via phone yesterday. Objective:       Vitals:       Last 24hrs VS reviewed since prior progress note.  Most recent are:    Visit Vitals  /80 (BP 1 Location: Right upper arm, BP Patient Position: At rest)   Pulse 90   Temp 98.2 °F (36.8 °C)   Resp 18   Ht 5' 7\" (1.702 m)   Wt 80.4 kg (177 lb 4 oz)   SpO2 95%   BMI 27.76 kg/m²     SpO2 Readings from Last 6 Encounters:   21 95%   21 93%    O2 Flow Rate (L/min): 5 l/min     No intake or output data in the 24 hours ending 21 1109     Exam:     Physical Exam:    Gen:  Well-developed, well-nourished, in no acute distress  HEENT:  Pink conjunctivae, PERRL, hearing intact to voice  Resp:  No accessory muscle use, clear breath sounds without wheezes rales or rhonchi  Card:  RRR, No murmurs, normal S1, S2, b/l peripheral edema  Abd:  Soft, non-tender, non-distended, normoactive bowel sounds are present  Musc:  No cyanosis or clubbing  Skin:  No rashes or ulcers, skin turgor is good  Neuro:  Cranial nerves 3-12 are grossly intact,  follows commands appropriately  Psych:  Oriented to person, place, and time, Alert with good insight      Medications Reviewed: (see below)    Lab Data Reviewed: (see below)    ______________________________________________________________________    Medications:     Current Facility-Administered Medications   Medication Dose Route Frequency    lisinopriL (PRINIVIL, ZESTRIL) tablet 2.5 mg  2.5 mg Oral DAILY    LORazepam (ATIVAN) injection 0.5 mg  0.5 mg IntraVENous BID PRN    sodium chloride (NS) flush 5-40 mL  5-40 mL IntraVENous Q8H    sodium chloride (NS) flush 5-40 mL  5-40 mL IntraVENous PRN    acetaminophen (TYLENOL) tablet 650 mg  650 mg Oral Q6H PRN    Or    acetaminophen (TYLENOL) suppository 650 mg  650 mg Rectal Q6H PRN    polyethylene glycol (MIRALAX) packet 17 g  17 g Oral DAILY PRN    ondansetron (ZOFRAN ODT) tablet 4 mg  4 mg Oral Q8H PRN    Or    ondansetron (ZOFRAN) injection 4 mg  4 mg IntraVENous Q6H PRN    enoxaparin (LOVENOX) injection 40 mg  40 mg SubCUTAneous DAILY    carvediloL (COREG) tablet 25 mg  25 mg Oral BID WITH MEALS          Lab Review:     Recent Labs     12/25/21  0124 12/24/21  0025   WBC 8.1 10.3   HGB 16.0 16.5   HCT 51.0* 53.1*    239     Recent Labs     12/25/21  0124 12/24/21  0025    139   K 4.9 4.4   CL 98 96*   CO2 41* 42*   * 144*   BUN 26* 29*   CREA 0.74 0.91   CA 8.4* 8.1*   ALB 2.9* 2.8*   TBILI 1.0 1.0   ALT 30 34     No results found for: GLUCPOC       Assessment / Plan:     Acute respiratory failure with hypoxia and hypercapnia (HCC) (12/22/2021)/ dyspnea: Unclear etiology cardiac verus pulmonary; 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 CTA chest negative for PE. Respiratory viral panel neg.  12/22 ABG with hypoxia and hypercapnia. neg UDS. Bipap; NC alertnating. possible trilegy OP. Daily ABGs. Pulm following.       Acute on chronic heart failure ([address] (12/22/2021)/ Hx of cardiomyopathy: Unclear trigger. proBNP elevated to 844 but ct w/ trace effusions. troponins ok. echo w/ concern for shunt; obtian cardiac MRI when able. Holding further  IV diuretics. May need Right heart cath. Cardiology following.     LE swelling: suspect due to heart failure. Neg LE dopplers.      Hypertension: Continue home Coreg; reduce lisinopril dose due to borderline BPs.     Total time spent with patient: 32 Minutes **I personally saw and examined the patient during this time period**                 Care Plan discussed with: Patient and Nursing Staff    Discussed:  Care Plan    Prophylaxis:  Lovenox    Disposition:  Home w/Family           ___________________________________________________    Attending Physician: Aroldo Tan DO

## 2021-12-27 LAB
ARTERIAL PATENCY WRIST A: NO
BASE EXCESS BLDA CALC-SCNC: 11.4 MMOL/L
BDY SITE: ABNORMAL
FIO2 ON VENT: 40 %
GAS FLOW.O2 SETTING OXYMISER: 22 L/MIN
HCO3 BLDA-SCNC: 41 MMOL/L (ref 22–26)
IPAP/PIP, IPAPIP: 18
PCO2 BLDA: 75 MMHG (ref 35–45)
PEEP RESPIRATORY: 8 CM[H2O]
PH BLDA: 7.35 [PH] (ref 7.35–7.45)
PO2 BLDA: 97 MMHG (ref 80–100)
SAO2 % BLD: 97 % (ref 92–97)
SAO2% DEVICE SAO2% SENSOR NAME: ABNORMAL
SPECIMEN SITE: ABNORMAL
VENTILATION MODE VENT: ABNORMAL

## 2021-12-27 PROCEDURE — 74011250636 HC RX REV CODE- 250/636: Performed by: INTERNAL MEDICINE

## 2021-12-27 PROCEDURE — 94660 CPAP INITIATION&MGMT: CPT

## 2021-12-27 PROCEDURE — C1751 CATH, INF, PER/CENT/MIDLINE: HCPCS | Performed by: STUDENT IN AN ORGANIZED HEALTH CARE EDUCATION/TRAINING PROGRAM

## 2021-12-27 PROCEDURE — 65660000000 HC RM CCU STEPDOWN

## 2021-12-27 PROCEDURE — 77030029065 HC DRSG HEMO QCLOT ZMED -B: Performed by: STUDENT IN AN ORGANIZED HEALTH CARE EDUCATION/TRAINING PROGRAM

## 2021-12-27 PROCEDURE — 4A023N6 MEASUREMENT OF CARDIAC SAMPLING AND PRESSURE, RIGHT HEART, PERCUTANEOUS APPROACH: ICD-10-PCS | Performed by: STUDENT IN AN ORGANIZED HEALTH CARE EDUCATION/TRAINING PROGRAM

## 2021-12-27 PROCEDURE — 74011250637 HC RX REV CODE- 250/637: Performed by: INTERNAL MEDICINE

## 2021-12-27 PROCEDURE — 77030008543 HC TBNG MON PRSS MRTM -A: Performed by: STUDENT IN AN ORGANIZED HEALTH CARE EDUCATION/TRAINING PROGRAM

## 2021-12-27 PROCEDURE — C1894 INTRO/SHEATH, NON-LASER: HCPCS | Performed by: STUDENT IN AN ORGANIZED HEALTH CARE EDUCATION/TRAINING PROGRAM

## 2021-12-27 PROCEDURE — 94760 N-INVAS EAR/PLS OXIMETRY 1: CPT

## 2021-12-27 PROCEDURE — 74011250636 HC RX REV CODE- 250/636: Performed by: NURSE PRACTITIONER

## 2021-12-27 PROCEDURE — 77010033678 HC OXYGEN DAILY

## 2021-12-27 PROCEDURE — 93451 RIGHT HEART CATH: CPT | Performed by: STUDENT IN AN ORGANIZED HEALTH CARE EDUCATION/TRAINING PROGRAM

## 2021-12-27 PROCEDURE — 82803 BLOOD GASES ANY COMBINATION: CPT

## 2021-12-27 PROCEDURE — 74011000250 HC RX REV CODE- 250: Performed by: STUDENT IN AN ORGANIZED HEALTH CARE EDUCATION/TRAINING PROGRAM

## 2021-12-27 PROCEDURE — 82810 BLOOD GASES O2 SAT ONLY: CPT | Performed by: STUDENT IN AN ORGANIZED HEALTH CARE EDUCATION/TRAINING PROGRAM

## 2021-12-27 PROCEDURE — 2709999900 HC NON-CHARGEABLE SUPPLY: Performed by: STUDENT IN AN ORGANIZED HEALTH CARE EDUCATION/TRAINING PROGRAM

## 2021-12-27 PROCEDURE — 74011250636 HC RX REV CODE- 250/636: Performed by: STUDENT IN AN ORGANIZED HEALTH CARE EDUCATION/TRAINING PROGRAM

## 2021-12-27 PROCEDURE — 36600 WITHDRAWAL OF ARTERIAL BLOOD: CPT

## 2021-12-27 PROCEDURE — APPSS30 APP SPLIT SHARED TIME 16-30 MINUTES: Performed by: NURSE PRACTITIONER

## 2021-12-27 PROCEDURE — 99232 SBSQ HOSP IP/OBS MODERATE 35: CPT | Performed by: STUDENT IN AN ORGANIZED HEALTH CARE EDUCATION/TRAINING PROGRAM

## 2021-12-27 RX ORDER — FUROSEMIDE 10 MG/ML
20 INJECTION INTRAMUSCULAR; INTRAVENOUS ONCE
Status: COMPLETED | OUTPATIENT
Start: 2021-12-27 | End: 2021-12-27

## 2021-12-27 RX ORDER — LIDOCAINE HYDROCHLORIDE 10 MG/ML
INJECTION INFILTRATION; PERINEURAL AS NEEDED
Status: DISCONTINUED | OUTPATIENT
Start: 2021-12-27 | End: 2021-12-27 | Stop reason: HOSPADM

## 2021-12-27 RX ORDER — CARVEDILOL 6.25 MG/1
12.5 TABLET ORAL 2 TIMES DAILY WITH MEALS
Status: DISCONTINUED | OUTPATIENT
Start: 2021-12-27 | End: 2021-12-30 | Stop reason: HOSPADM

## 2021-12-27 RX ORDER — FENTANYL CITRATE 50 UG/ML
INJECTION, SOLUTION INTRAMUSCULAR; INTRAVENOUS AS NEEDED
Status: DISCONTINUED | OUTPATIENT
Start: 2021-12-27 | End: 2021-12-27 | Stop reason: HOSPADM

## 2021-12-27 RX ORDER — HEPARIN SODIUM 200 [USP'U]/100ML
INJECTION, SOLUTION INTRAVENOUS
Status: COMPLETED | OUTPATIENT
Start: 2021-12-27 | End: 2021-12-27

## 2021-12-27 RX ADMIN — CARVEDILOL 25 MG: 12.5 TABLET, FILM COATED ORAL at 09:45

## 2021-12-27 RX ADMIN — ENOXAPARIN SODIUM 40 MG: 100 INJECTION SUBCUTANEOUS at 09:45

## 2021-12-27 RX ADMIN — Medication 10 ML: at 16:08

## 2021-12-27 RX ADMIN — LISINOPRIL 2.5 MG: 5 TABLET ORAL at 09:45

## 2021-12-27 RX ADMIN — FUROSEMIDE 20 MG: 10 INJECTION, SOLUTION INTRAMUSCULAR; INTRAVENOUS at 16:09

## 2021-12-27 NOTE — PROGRESS NOTES
Problem: Heart Failure: Day 4  Goal: Activity/Safety  Outcome: Progressing Towards Goal  Goal: Nutrition/Diet  Outcome: Progressing Towards Goal  Goal: Medications  Outcome: Progressing Towards Goal  Goal: Respiratory  Outcome: Progressing Towards Goal  Goal: Psychosocial  Outcome: Progressing Towards Goal

## 2021-12-27 NOTE — PROCEDURES
BRIEF PROCEDURE NOTE    Date of Procedure: 12/27/2021   Preoperative Diagnosis: hypoxia  Postoperative Diagnosis: hypoxia    Procedure:right heart cath with shunt run and cardiac outputs  Interventional Cardiologist: Kecia Cali DO  Assistant: None  Anesthesia: local + IV moderate sedation   I administered moderate sedation throughout this procedure. An independent trained observer pushed medications at my direction, and monitored the patients level of consciousness and physiological status throughout. Estimated Blood Loss: Minimal    Access: right IJ vein, 7F    RHC findings:    RAP=    21/29/20 mmHg  RVSP=  80/35  mmHg  PAP=     70/45/53 mmHg  PCWP=  41/44/39  mmHg  CO=       Slim 5.27 thermal 3.6  L/min  CI=    Slim 2.75 thermal 1.88   L/min/m2   AO= 135/100/114 mmHg    SVC= 69.5%  IVC= 49.5%  High RA= 76.2%  Mid-RA= 68.2%  Low RA= 61.9%  PA= 60.6%  Wedge= 58.1%  RV= 60.8%  SaO2= 91% on 5L O2        Specimens Removed: None    Implants: n/a    Closure Device: radial TR band    See full cath note.     Complications: none          DO Jeremiah Aguilar DO  [facility] [address] [place] [address], [address]                                              Office [phone],KMI [phone]

## 2021-12-27 NOTE — PROGRESS NOTES
Oxygen level dropped to 80% on room air, took approximately 3 minutes to come up to 88-90% on 4 liters, MD aware.

## 2021-12-27 NOTE — PROGRESS NOTES
CARDIOLOGY PROGRESS NOTE        [address] [facility], [place], [address] [facility]  Phone [phone]; Fax [phone]          2021 9:11 AM       Admit Date:           2021  Admit Diagnosis:  Acute respiratory failure with hypoxia ([address] [J96.01]  :          1965   MRN:          [id number]        Assessment/Plan  1. Acute hypoxic resp failure: pulmonary following, weaned to NC 5L, checking exercise O2. NIV at night. Remains hypercapneic   2. 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 Reports having severe hypotension after aggressive diuresis in the spring 2021. Will give lasix 20 mg IV and if BP tolerates can increase/add second dose. Long-term he would benefit from cardiac MRI if possible with his prior thoracic fusion rods, will need to be done outpatient. ECHO w/ bubble study revealing possible R-L shunt, study inadequate. RHC today revealing R-L shunting at atrial level, sig pulm HTN. Will schedule for ADAN tomorrow to evaluate shunt further. Consult Advanced HF   3. Hx klippel- Feil Syndrome  4. HTN: on coreg 25 mg bid, lisinopril 2.5 mg. Watch BP w/ diuresis and adjust meds as needed                     We discussed the expected course, resolution and complications of the diagnosis(es) in detail. Medication risks, benefits, costs, interactions, and alternatives were discussed as indicated. No intake/output data recorded. Last 3 Recorded Weights in this Encounter    21 0342 21 0357 21 0636   Weight: 79 kg (174 lb 2.6 oz) 80.4 kg (177 lb 4 oz) 78 kg (171 lb 15.3 oz)         1901 -  0700  In: 600 [P.O.:600]  Out: -     [address] reports feeling fatigued.  Does not have good insight into his disease process       Current Facility-Administered Medications   Medication Dose Route Frequency    lisinopriL (PRINIVIL, ZESTRIL) tablet 2.5 mg  2.5 mg Oral DAILY    LORazepam (ATIVAN) injection 0.5 mg  0.5 mg IntraVENous BID PRN  sodium chloride (NS) flush 5-40 mL  5-40 mL IntraVENous Q8H    sodium chloride (NS) flush 5-40 mL  5-40 mL IntraVENous PRN    acetaminophen (TYLENOL) tablet 650 mg  650 mg Oral Q6H PRN    Or    acetaminophen (TYLENOL) suppository 650 mg  650 mg Rectal Q6H PRN    polyethylene glycol (MIRALAX) packet 17 g  17 g Oral DAILY PRN    ondansetron (ZOFRAN ODT) tablet 4 mg  4 mg Oral Q8H PRN    Or    ondansetron (ZOFRAN) injection 4 mg  4 mg IntraVENous Q6H PRN    enoxaparin (LOVENOX) injection 40 mg  40 mg SubCUTAneous DAILY    carvediloL (COREG) tablet 25 mg  25 mg Oral BID WITH MEALS      OBJECTIVE               Intake/Output Summary (Last 24 hours) at 2021 0811  Last data filed at 2021 0445  Gross per 24 hour   Intake 600 ml   Output --   Net 600 ml       Review of Systems - History obtained from the patient AS PER  HPI        PHYSICAL EXAM        Visit Vitals  /78 (BP 1 Location: Right upper arm, BP Patient Position: At rest)   Pulse 87   Temp 97.5 °F (36.4 °C)   Resp 17   Ht 5' 7\" (1.702 m)   Wt 78 kg (171 lb 15.3 oz)   SpO2 94%   BMI 26.93 kg/m²       Gen: Well-developed, well-nourished, in no acute distress  alert and oriented x 3  HEENT:  Pink conjunctivae, Hearing grossly normal.  No scleral icterus or conjunctival, moist mucous membranes  Neck: Supple,No JVD  Resp: No accessory muscle use, diminished bases   Card: Regular Rate, Rythm,Normal S1, S2, No murmurs, rubs or gallop.    MSK: No cyanosis or clubbing, good capillary refill  Skin: No rashes or ulcers, no bruising  Neuro:  Moving all four extremities, no focal deficit, follows commands appropriately  Psych:  Poor insight, oriented to person, place and time, alert, Nml Affect  LE: + 1 LE edema w/ erythema to ankles       DATA REVIEW      Records received from [facility]  Echo 2016 ef 40-45%  Ef improved w GDMT  Echo 2017 EF 55%  Normal nuclear stress test  2017     Brother  from CHF in his 45s   Father AA s/p repair at age 77 yo    Cardiac monitor: SR         Laboratory and Imaging have been reviewed by me and are notable for  No results for input(s): CPK, CKMB, TROIQ in the last 72 hours.   Recent Labs     12/26/21  1511 12/25/21  0124    138   K 5.0 4.9   CO2 43* 41*   BUN 18 26*   CREA 0.93 0.74   * 111*   WBC 7.9 8.1   HGB 16.0 16.0   HCT 50.4* 51.0*    221             Pati Dan, NP

## 2021-12-27 NOTE — H&P
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 He notes worsening SOB over the past several months. He denies any significant change in mild LE edema. Denies change in weight. He has never smoked. Denies h/o lung disease. Denies cough or wheezing. Denies fevers. Does note intermittent chills. Pt states that he does have some snoring, but denies apnea. He does awaken refreshed, but does endorse some daytime somnolence. 12/24: No change in breathing. Currently on BIPAP and does not feel that it is helpful. 12/27: Feeling well, denies any SOB/cough/wheezing/edema. Weaned from 5L o2 to room air at rest during my exam-- spo2 remains in the mid-90's on room air at rest. He reports he has tolerated bipap use at night. Past Medical History:   Diagnosis Date    Heart failure ([address]     Hypertension       Past Surgical History:   Procedure Laterality Date    HX ORTHOPAEDIC        Prior to Admission medications    Medication Sig Start Date End Date Taking? Authorizing Provider   carvediloL (COREG) 25 mg tablet Take 25 mg by mouth two (2) times daily (with meals). Yes Provider, Historical   furosemide (Lasix) 20 mg tablet Take 20 mg by mouth daily as needed. Yes Provider, Historical   lisinopriL (PRINIVIL, ZESTRIL) 5 mg tablet Take 5 mg by mouth daily. Yes Provider, Historical   ascorbic acid, vitamin C, (Vitamin C) 500 mg tablet Take 500 mg by mouth daily. Yes Provider, Historical   therapeutic multivitamin (THERAGRAN) tablet Take 1 Tablet by mouth daily. Yes Provider, Historical   cyanocobalamin (Vitamin B-12) 100 mcg tablet Take 100 mcg by mouth daily.    Yes Provider, Historical     No Known Allergies   Social History     Tobacco Use    Smoking status: Never Smoker    Smokeless tobacco: Never Used   Substance Use Topics  Alcohol use: Yes     Comment: socially       Family History   Problem Relation Age of Onset    Heart Disease Brother         Review of Systems   Constitutional: Positive for chills. Negative for activity change, appetite change, fever and unexpected weight change. HENT: Negative for congestion. Respiratory: Positive for shortness of breath. Negative for apnea, cough, chest tightness and wheezing. Cardiovascular: Positive for leg swelling. Negative for chest pain. Gastrointestinal: Negative for abdominal distention. Endocrine: Positive for cold intolerance. Genitourinary: Negative for difficulty urinating. Musculoskeletal: Positive for back pain. Allergic/Immunologic: Negative for environmental allergies. Neurological: Negative for dizziness. Hematological: Negative for adenopathy. Psychiatric/Behavioral: Negative for agitation. Objective:     Blood pressure 121/83, pulse 95, temperature 97.7 °F (36.5 °C), resp. rate 18, height 5' 7\" (1.702 m), weight 78 kg (171 lb 15.3 oz), SpO2 95 %. Temp (24hrs), Av.9 °F (36.6 °C), Min:97.3 °F (36.3 °C), Max:98.6 °F (37 °C)        701 - 1900  In: 120 [P.O.:120]  Out: -   1901 -  0700  In: 600 [P.O.:600]  Out: -     Physical Exam  Constitutional:       Appearance: Normal appearance. He is normal weight. HENT:      Head: Normocephalic and atraumatic. Right Ear: External ear normal.      Left Ear: External ear normal.      Nose: Nose normal.      Mouth/Throat:      Mouth: Mucous membranes are dry. Eyes:      Extraocular Movements: Extraocular movements intact. Cardiovascular:      Rate and Rhythm: Normal rate and regular rhythm. Heart sounds: Normal heart sounds. Pulmonary:      Effort: Pulmonary effort is normal.      Breath sounds: Normal breath sounds. Comments: Room air   Musculoskeletal:      Cervical back: No tenderness. Skin:     General: Skin is warm and dry.    Neurological:      Mental Status: He is alert. Comments: Awake and alert   Psychiatric:         Mood and Affect: Mood normal.         Behavior: Behavior normal.       Data Review:   Recent Results (from the past 24 hour(s))   CBC WITH AUTOMATED DIFF    Collection Time: 12/26/21  3:11 PM   Result Value Ref Range    WBC 7.9 4.1 - 11.1 K/uL    RBC 4.99 4. 10 - 5.70 M/uL    HGB 16.0 12.1 - 17.0 g/dL    HCT 50.4 (H) 36.6 - 50.3 %    .0 (H) 80.0 - 99.0 FL    MCH 32.1 26.0 - 34.0 PG    MCHC 31.7 30.0 - 36.5 g/dL    RDW 13.8 11.5 - 14.5 %    PLATELET 517 206 - 505 K/uL    MPV 9.7 8.9 - 12.9 FL    NRBC 0.0 0  WBC    ABSOLUTE NRBC 0.00 0.00 - 0.01 K/uL    NEUTROPHILS 79 (H) 32 - 75 %    BAND NEUTROPHILS 4 0 - 6 %    LYMPHOCYTES 6 (L) 12 - 49 %    MONOCYTES 8 5 - 13 %    EOSINOPHILS 3 0 - 7 %    BASOPHILS 0 0 - 1 %    IMMATURE GRANULOCYTES 0 %    ABS. NEUTROPHILS 6.6 1.8 - 8.0 K/UL    ABS. LYMPHOCYTES 0.5 (L) 0.8 - 3.5 K/UL    ABS. MONOCYTES 0.6 0.0 - 1.0 K/UL    ABS. EOSINOPHILS 0.2 0.0 - 0.4 K/UL    ABS. BASOPHILS 0.0 0.0 - 0.1 K/UL    ABS. IMM.  GRANS. 0.0 K/UL    DF MANUAL      RBC COMMENTS NORMOCYTIC, NORMOCHROMIC      WBC COMMENTS REACTIVE LYMPHS     METABOLIC PANEL, BASIC    Collection Time: 12/26/21  3:11 PM   Result Value Ref Range    Sodium 140 136 - 145 mmol/L    Potassium 5.0 3.5 - 5.1 mmol/L    Chloride 92 (L) 97 - 108 mmol/L    CO2 43 (HH) 21 - 32 mmol/L    Anion gap 5 5 - 15 mmol/L    Glucose 174 (H) 65 - 100 mg/dL    BUN 18 6 - 20 MG/DL    Creatinine 0.93 0.70 - 1.30 MG/DL    BUN/Creatinine ratio 19 12 - 20      GFR est AA >60 >60 ml/min/1.73m2    GFR est non-AA >60 >60 ml/min/1.73m2    Calcium 8.5 8.5 - 10.1 MG/DL   BLOOD GAS, ARTERIAL    Collection Time: 12/27/21  5:05 AM   Result Value Ref Range    pH 7.35 7.35 - 7.45      PCO2 75 (H) 35 - 45 mmHg    PO2 97 80 - 100 mmHg    O2 SAT 97 92 - 97 %    BICARBONATE 41 (H) 22 - 26 mmol/L    BASE EXCESS 11.4 mmol/L    O2 METHOD BIPAP      FIO2 40 %    MODE BIPAP      SET RATE 22      IPAP/PIP 18      PEEP/CPAP 8.0      Sample source ARTERIAL      SITE RIGHT BRACHIAL      LILLIAM'S TEST NO       CTA chest: FINDINGS:  THYROID: No nodule. MEDIASTINUM: No mass or lymphadenopathy. RACHEL: No mass or lymphadenopathy. THORACIC AORTA: No dissection or aneurysm. PULMONARY ARTERIES: Normal in caliber. The pulmonary arteries are well enhanced. No evidence of pulmonary embolus. TRACHEA/BRONCHI: Patent. ESOPHAGUS: No wall thickening or dilatation. HEART: Normal in size. PLEURA: Trace right pleural effusion. No left pleural effusion. LUNGS: There is mild bibasilar atelectasis. No nodule, mass, or airspace  disease. INCIDENTALLY IMAGED UPPER ABDOMEN: No focal abnormality. BONES: There is moderate dextroconvex scoliosis of the thoracic spine. A  Greco tania is in place.     IMPRESSION  1. Negative for pulmonary embolus. 2. Trace right pleural effusion with mild bibasilar atelectasis. .      Assessment:   Acute/Chronic respiratory failure with hypoxia and hypercapnia  Klippel-Feil syndrome s/p extensive back surgery  Mild Volume OL  H/o CHF  ? PFO on TTE  Suspected GABY      Plan:   supplemental for goal sats >90%-- currently on room air. Check exercise oximetry. NIV at night while inpatient. Recommend outpatient sleep study for suspected GABY. S/p RHC this AM. Further diuresis per cards. Suspect Klippel-Feil syndrome with underlying spinal deformities contributing to resp failure  Encourage OOB/IS  Recommend outpt PFTs. Will arrange follow up in our pulmonary and sleep clinics. Discussed case with hospitalist.  Pulmonary will sign off and will be available PRN call for questions or concerns.

## 2021-12-27 NOTE — PROGRESS NOTES
Transition of Care Plan:  RUR-8%  1. PT/OT eval: no needs  2. RT to evaulate pt for home O2 needs; currently on 5L O2  3. Pulmonary and cardiology following  4. Family to transport home at d/c  5. CM following for any needs prior to d/c  GABI Grover

## 2021-12-28 ENCOUNTER — HOSPITAL ENCOUNTER (OUTPATIENT)
Dept: NON INVASIVE DIAGNOSTICS | Age: 56
Discharge: HOME | End: 2021-12-28
Attending: NURSE PRACTITIONER
Payer: COMMERCIAL

## 2021-12-28 ENCOUNTER — HOSPITAL ENCOUNTER (OUTPATIENT)
Dept: MRI IMAGING | Age: 56
End: 2021-12-28
Attending: INTERNAL MEDICINE
Payer: COMMERCIAL

## 2021-12-28 VITALS
HEART RATE: 83 BPM | RESPIRATION RATE: 13 BRPM | OXYGEN SATURATION: 95 % | HEIGHT: 67 IN | SYSTOLIC BLOOD PRESSURE: 86 MMHG | WEIGHT: 171.96 LBS | DIASTOLIC BLOOD PRESSURE: 63 MMHG | BODY MASS INDEX: 26.99 KG/M2

## 2021-12-28 LAB
ALBUMIN SERPL-MCNC: 3.1 G/DL (ref 3.5–5)
ALBUMIN/GLOB SERPL: 1 {RATIO} (ref 1.1–2.2)
ALP SERPL-CCNC: 69 U/L (ref 45–117)
ALT SERPL-CCNC: 42 U/L (ref 12–78)
ANION GAP SERPL CALC-SCNC: 4 MMOL/L (ref 5–15)
ANION GAP SERPL CALC-SCNC: 6 MMOL/L (ref 5–15)
ARTERIAL PATENCY WRIST A: YES
AST SERPL-CCNC: 42 U/L (ref 15–37)
BASE EXCESS BLDA CALC-SCNC: 12.6 MMOL/L
BASOPHILS # BLD: 0 K/UL (ref 0–0.1)
BASOPHILS NFR BLD: 0 % (ref 0–1)
BDY SITE: ABNORMAL
BILIRUB SERPL-MCNC: 1.1 MG/DL (ref 0.2–1)
BUN SERPL-MCNC: 14 MG/DL (ref 6–20)
BUN SERPL-MCNC: 16 MG/DL (ref 6–20)
BUN/CREAT SERPL: 17 (ref 12–20)
BUN/CREAT SERPL: 18 (ref 12–20)
CALCIUM SERPL-MCNC: 8 MG/DL (ref 8.5–10.1)
CALCIUM SERPL-MCNC: 8.5 MG/DL (ref 8.5–10.1)
CHLORIDE SERPL-SCNC: 93 MMOL/L (ref 97–108)
CHLORIDE SERPL-SCNC: 95 MMOL/L (ref 97–108)
CO2 SERPL-SCNC: 39 MMOL/L (ref 21–32)
CO2 SERPL-SCNC: 40 MMOL/L (ref 21–32)
CREAT SERPL-MCNC: 0.81 MG/DL (ref 0.7–1.3)
CREAT SERPL-MCNC: 0.87 MG/DL (ref 0.7–1.3)
DIFFERENTIAL METHOD BLD: ABNORMAL
EOSINOPHIL # BLD: 0.1 K/UL (ref 0–0.4)
EOSINOPHIL NFR BLD: 2 % (ref 0–7)
ERYTHROCYTE [DISTWIDTH] IN BLOOD BY AUTOMATED COUNT: 13.8 % (ref 11.5–14.5)
FIO2 ON VENT: 35 %
GAS FLOW.O2 SETTING OXYMISER: 22 L/MIN
GLOBULIN SER CALC-MCNC: 3.1 G/DL (ref 2–4)
GLUCOSE SERPL-MCNC: 111 MG/DL (ref 65–100)
GLUCOSE SERPL-MCNC: 91 MG/DL (ref 65–100)
HCO3 BLDA-SCNC: 42 MMOL/L (ref 22–26)
HCT VFR BLD AUTO: 53.9 % (ref 36.6–50.3)
HGB BLD-MCNC: 17 G/DL (ref 12.1–17)
IMM GRANULOCYTES # BLD AUTO: 0 K/UL (ref 0–0.04)
IMM GRANULOCYTES NFR BLD AUTO: 1 % (ref 0–0.5)
IPAP/PIP, IPAPIP: 18
LYMPHOCYTES # BLD: 0.9 K/UL (ref 0.8–3.5)
LYMPHOCYTES NFR BLD: 14 % (ref 12–49)
MCH RBC QN AUTO: 32 PG (ref 26–34)
MCHC RBC AUTO-ENTMCNC: 31.5 G/DL (ref 30–36.5)
MCV RBC AUTO: 101.3 FL (ref 80–99)
MONOCYTES # BLD: 0.6 K/UL (ref 0–1)
MONOCYTES NFR BLD: 10 % (ref 5–13)
NEUTS SEG # BLD: 4.7 K/UL (ref 1.8–8)
NEUTS SEG NFR BLD: 73 % (ref 32–75)
NRBC # BLD: 0 K/UL (ref 0–0.01)
NRBC BLD-RTO: 0 PER 100 WBC
PCO2 BLDA: 75 MMHG (ref 35–45)
PEEP RESPIRATORY: 8 CM[H2O]
PH BLDA: 7.37 [PH] (ref 7.35–7.45)
PLATELET # BLD AUTO: 219 K/UL (ref 150–400)
PMV BLD AUTO: 9.6 FL (ref 8.9–12.9)
PO2 BLDA: 112 MMHG (ref 80–100)
POTASSIUM SERPL-SCNC: 5 MMOL/L (ref 3.5–5.1)
POTASSIUM SERPL-SCNC: 5.2 MMOL/L (ref 3.5–5.1)
PROT SERPL-MCNC: 6.2 G/DL (ref 6.4–8.2)
RBC # BLD AUTO: 5.32 M/UL (ref 4.1–5.7)
SAO2 % BLD: 98 % (ref 92–97)
SAO2% DEVICE SAO2% SENSOR NAME: ABNORMAL
SODIUM SERPL-SCNC: 138 MMOL/L (ref 136–145)
SODIUM SERPL-SCNC: 139 MMOL/L (ref 136–145)
SPECIMEN SITE: ABNORMAL
VENTILATION MODE VENT: ABNORMAL
WBC # BLD AUTO: 6.3 K/UL (ref 4.1–11.1)

## 2021-12-28 PROCEDURE — 77010033678 HC OXYGEN DAILY

## 2021-12-28 PROCEDURE — 74011250637 HC RX REV CODE- 250/637: Performed by: NURSE PRACTITIONER

## 2021-12-28 PROCEDURE — 80053 COMPREHEN METABOLIC PANEL: CPT

## 2021-12-28 PROCEDURE — 99152 MOD SED SAME PHYS/QHP 5/>YRS: CPT | Performed by: SPECIALIST

## 2021-12-28 PROCEDURE — B24BZZ4 ULTRASONOGRAPHY OF HEART WITH AORTA, TRANSESOPHAGEAL: ICD-10-PCS | Performed by: SPECIALIST

## 2021-12-28 PROCEDURE — 82803 BLOOD GASES ANY COMBINATION: CPT

## 2021-12-28 PROCEDURE — 94660 CPAP INITIATION&MGMT: CPT

## 2021-12-28 PROCEDURE — 74011250637 HC RX REV CODE- 250/637: Performed by: STUDENT IN AN ORGANIZED HEALTH CARE EDUCATION/TRAINING PROGRAM

## 2021-12-28 PROCEDURE — 74011250636 HC RX REV CODE- 250/636: Performed by: SPECIALIST

## 2021-12-28 PROCEDURE — 65660000000 HC RM CCU STEPDOWN

## 2021-12-28 PROCEDURE — 36600 WITHDRAWAL OF ARTERIAL BLOOD: CPT

## 2021-12-28 PROCEDURE — 94760 N-INVAS EAR/PLS OXIMETRY 1: CPT

## 2021-12-28 PROCEDURE — 74011000250 HC RX REV CODE- 250: Performed by: SPECIALIST

## 2021-12-28 PROCEDURE — 85025 COMPLETE CBC W/AUTO DIFF WBC: CPT

## 2021-12-28 PROCEDURE — 74011250636 HC RX REV CODE- 250/636: Performed by: INTERNAL MEDICINE

## 2021-12-28 PROCEDURE — 92950 HEART/LUNG RESUSCITATION CPR: CPT

## 2021-12-28 PROCEDURE — 36415 COLL VENOUS BLD VENIPUNCTURE: CPT

## 2021-12-28 PROCEDURE — 99232 SBSQ HOSP IP/OBS MODERATE 35: CPT | Performed by: SPECIALIST

## 2021-12-28 PROCEDURE — 97530 THERAPEUTIC ACTIVITIES: CPT

## 2021-12-28 PROCEDURE — 97116 GAIT TRAINING THERAPY: CPT

## 2021-12-28 PROCEDURE — 74011250637 HC RX REV CODE- 250/637: Performed by: INTERNAL MEDICINE

## 2021-12-28 PROCEDURE — APPSS30 APP SPLIT SHARED TIME 16-30 MINUTES: Performed by: NURSE PRACTITIONER

## 2021-12-28 RX ORDER — MIDAZOLAM HYDROCHLORIDE 1 MG/ML
.5-1 INJECTION, SOLUTION INTRAMUSCULAR; INTRAVENOUS
Status: DISCONTINUED | OUTPATIENT
Start: 2021-12-28 | End: 2021-12-28 | Stop reason: ALTCHOICE

## 2021-12-28 RX ORDER — FENTANYL CITRATE 50 UG/ML
25-200 INJECTION, SOLUTION INTRAMUSCULAR; INTRAVENOUS
Status: DISCONTINUED | OUTPATIENT
Start: 2021-12-28 | End: 2021-12-28 | Stop reason: ALTCHOICE

## 2021-12-28 RX ORDER — LIDOCAINE HYDROCHLORIDE 20 MG/ML
15 SOLUTION OROPHARYNGEAL
Status: DISCONTINUED | OUTPATIENT
Start: 2021-12-28 | End: 2021-12-28 | Stop reason: ALTCHOICE

## 2021-12-28 RX ORDER — ISOSORBIDE DINITRATE 10 MG/1
10 TABLET ORAL 2 TIMES DAILY
Status: DISCONTINUED | OUTPATIENT
Start: 2021-12-28 | End: 2021-12-28

## 2021-12-28 RX ORDER — LIDOCAINE HYDROCHLORIDE 20 MG/ML
JELLY TOPICAL
Status: DISCONTINUED | OUTPATIENT
Start: 2021-12-28 | End: 2021-12-28 | Stop reason: ALTCHOICE

## 2021-12-28 RX ORDER — SODIUM CHLORIDE 9 MG/ML
10 INJECTION INTRAMUSCULAR; INTRAVENOUS; SUBCUTANEOUS
Status: DISCONTINUED | OUTPATIENT
Start: 2021-12-28 | End: 2021-12-28 | Stop reason: ALTCHOICE

## 2021-12-28 RX ORDER — FLUMAZENIL 0.1 MG/ML
1 INJECTION INTRAVENOUS
Status: COMPLETED | OUTPATIENT
Start: 2021-12-28 | End: 2021-12-28

## 2021-12-28 RX ORDER — NALOXONE HYDROCHLORIDE 0.4 MG/ML
0.4 INJECTION, SOLUTION INTRAMUSCULAR; INTRAVENOUS; SUBCUTANEOUS
Status: COMPLETED | OUTPATIENT
Start: 2021-12-28 | End: 2021-12-28

## 2021-12-28 RX ORDER — LIDOCAINE 50 MG/G
OINTMENT TOPICAL
Status: DISCONTINUED | OUTPATIENT
Start: 2021-12-28 | End: 2021-12-28 | Stop reason: ALTCHOICE

## 2021-12-28 RX ADMIN — FENTANYL CITRATE 50 MCG: 50 INJECTION, SOLUTION INTRAMUSCULAR; INTRAVENOUS at 11:31

## 2021-12-28 RX ADMIN — CARVEDILOL 12.5 MG: 12.5 TABLET, FILM COATED ORAL at 16:29

## 2021-12-28 RX ADMIN — ENOXAPARIN SODIUM 40 MG: 100 INJECTION SUBCUTANEOUS at 09:21

## 2021-12-28 RX ADMIN — FLUMAZENIL 1 MG: 0.1 INJECTION INTRAVENOUS at 11:39

## 2021-12-28 RX ADMIN — Medication 10 ML: at 22:23

## 2021-12-28 RX ADMIN — BENZOCAINE: 200 SPRAY DENTAL; ORAL; PERIODONTAL at 11:23

## 2021-12-28 RX ADMIN — LIDOCAINE HYDROCHLORIDE 15 ML: 20 SOLUTION ORAL at 11:20

## 2021-12-28 RX ADMIN — ISOSORBIDE DINITRATE 10 MG: 10 TABLET ORAL at 09:25

## 2021-12-28 RX ADMIN — MIDAZOLAM 1 MG: 1 INJECTION INTRAMUSCULAR; INTRAVENOUS at 11:31

## 2021-12-28 RX ADMIN — NALOXONE HYDROCHLORIDE 0.4 MG: 0.4 INJECTION, SOLUTION INTRAMUSCULAR; INTRAVENOUS; SUBCUTANEOUS at 11:37

## 2021-12-28 RX ADMIN — CARVEDILOL 12.5 MG: 12.5 TABLET, FILM COATED ORAL at 09:21

## 2021-12-28 RX ADMIN — Medication 10 ML: at 09:22

## 2021-12-28 RX ADMIN — SODIUM CHLORIDE 500 ML: 9 INJECTION, SOLUTION INTRAVENOUS at 11:36

## 2021-12-28 RX ADMIN — ACETAMINOPHEN 650 MG: 325 TABLET ORAL at 19:42

## 2021-12-28 NOTE — PROGRESS NOTES
12/28/2021  9:47 AM  Care Management Progress Note      ICD-10-CM ICD-9-CM    1. Acute respiratory failure with hypoxia (HCC)  J96.01 518.81    2. Acute congestive heart failure, unspecified heart failure type (HCC)  I50.9 428.0    3. Hypoxia  R09.02 799.02 CARDIAC PROCEDURE      CARDIAC PROCEDURE   4. Cardiomyopathy ([facility] [address]  I42.9 425.4 CARDIAC PROCEDURE      CARDIAC PROCEDURE       RUR:  7%  Risk Level: [x]Low []Moderate []High  Value-based purchasing: [] Yes [x] No  Bundle patient: [] Yes [x] No   Specify:     Transition of care plan:  1. Awaiting medical clearance and discharge order. Cardiology and pulmonology following. Pt requiring O2.   2. TBD based on medical progression and treatment plan - HH vs transfer to another facility. 3. Outpatient follow-up. 4. Transport need TBD.

## 2021-12-28 NOTE — PROCEDURES
Sima Thurston MD. [facility]              Patient: Thomas Pino  : 1965      Today's Date: 2021        BRIEF ADAN PROCEDURE NOTE    Date of Procedure: 2021   Preoperative Diagnosis: intracardiac shunt   Postoperative Diagnosis: ADAN aborted   Procedure: Transesophageal Echo  Cardiologist: Belkys Alan MD  Anesthesia: local + IV sedation  Estimated Blood Loss: None  Specimens Removed: None  Assistants: None  Grafts, transplants, or devices implanted: None  Findings: ADAN aborted . Complications: Patient was given 1 mg IV versed and 50 mcg Fentanyl. In a couple of minutes his O2 sats started dropping and SBP dropped in 60's and he was difficult to arouse. He was placed on 100% NRB mask and then quickly switched to an Abmo bag. At the same time Rapid Response and then Code Blue called and reversal drugs (flumazenil and narcan) were given. He quickly responded to these measures and he woke up and started breathing on his own. His BP came up (SBP 90's) and he was awake and responsive. We will monitor with 1:1 nursing in stress lab next 4 hours and then upgrade him to a telemetry bed.

## 2021-12-28 NOTE — PROGRESS NOTES
CM Note:  Per cardiology note, pt to transfer to [facility] room 332. A rapid was called then a code blue during ADAN.   ZACKARY Bruno

## 2021-12-28 NOTE — PROGRESS NOTES
[facility]  [address] [facility], [address] [place] [address]  [phone]      Medical Progress Note      NAME: Mariama Carlos   :  1965  MRM:  [id number]    Date/Time of service: 2021         Subjective:     Chief Complaint:  Patient was personally seen and examined by me during this time period. Chart reviewed. F/u acute resp failure. Remains on bipap at night; nc during day. Some dyspnea with exertion, no chest pain. Objective:       Vitals:       Last 24hrs VS reviewed since prior progress note.  Most recent are:    Visit Vitals  /74 (BP 1 Location: Left upper arm, BP Patient Position: At rest)   Pulse 98   Temp 98.9 °F (37.2 °C)   Resp 19   Ht 5' 7\" (1.702 m)   Wt 78 kg (171 lb 15.3 oz)   SpO2 95%   BMI 26.93 kg/m²     SpO2 Readings from Last 6 Encounters:   21 95%   21 93%    O2 Flow Rate (L/min): 4 l/min       Intake/Output Summary (Last 24 hours) at 2021  Last data filed at 2021  Gross per 24 hour   Intake 760 ml   Output 975 ml   Net -215 ml        Exam:     Physical Exam:    Gen:  Well-developed, well-nourished, in no acute distress  HEENT:  Pink conjunctivae, PERRL, hearing intact to voice  Resp:  No accessory muscle use, clear breath sounds without wheezes rales or rhonchi  Card:  RRR, No murmurs, normal S1, S2, b/l peripheral edema  Abd:  Soft, non-tender, non-distended, normoactive bowel sounds are present  Musc:  No cyanosis or clubbing  Skin:  No rashes or ulcers, skin turgor is good  Neuro:  Cranial nerves 3-12 are grossly intact,  follows commands appropriately  Psych:  Oriented to person, place, and time, Alert with good insight      Medications Reviewed: (see below)    Lab Data Reviewed: (see below)    ______________________________________________________________________    Medications:     Current Facility-Administered Medications   Medication Dose Route Frequency    carvediloL (COREG) tablet 12.5 mg  12.5 mg Oral BID WITH MEALS    lisinopriL (PRINIVIL, ZESTRIL) tablet 2.5 mg  2.5 mg Oral DAILY    LORazepam (ATIVAN) injection 0.5 mg  0.5 mg IntraVENous BID PRN    sodium chloride (NS) flush 5-40 mL  5-40 mL IntraVENous Q8H    sodium chloride (NS) flush 5-40 mL  5-40 mL IntraVENous PRN    acetaminophen (TYLENOL) tablet 650 mg  650 mg Oral Q6H PRN    Or    acetaminophen (TYLENOL) suppository 650 mg  650 mg Rectal Q6H PRN    polyethylene glycol (MIRALAX) packet 17 g  17 g Oral DAILY PRN    ondansetron (ZOFRAN ODT) tablet 4 mg  4 mg Oral Q8H PRN    Or    ondansetron (ZOFRAN) injection 4 mg  4 mg IntraVENous Q6H PRN    enoxaparin (LOVENOX) injection 40 mg  40 mg SubCUTAneous DAILY          Lab Review:     Recent Labs     12/26/21  1511 12/25/21  0124   WBC 7.9 8.1   HGB 16.0 16.0   HCT 50.4* 51.0*    221     Recent Labs     12/26/21  1511 12/25/21  0124    138   K 5.0 4.9   CL 92* 98   CO2 43* 41*   * 111*   BUN 18 26*   CREA 0.93 0.74   CA 8.5 8.4*   ALB  --  2.9*   TBILI  --  1.0   ALT  --  30     No results found for: GLUCPOC       Assessment / Plan:     Acute respiratory failure with hypoxia and hypercapnia (HCC) (12/22/2021)/ dyspnea: Unclear etiology cardiac verus pulmonary; 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 CTA chest negative for PE. Respiratory viral panel neg.  12/22 ABG with hypoxia and hypercapnia. neg UDS. Bipap at night; NC during day. possible trilegy OP. Daily ABGs. Pulm following.       Acute on chronic heart failure ([address] (12/22/2021)/ Hx of cardiomyopathy: Unclear trigger. proBNP elevated to 844 but ct w/ trace effusions. troponins ok. echo w/ concern for shunt; obtian cardiac MRI when able. Holding further  IV diuretics. Right heart cath 12/27. Cardiology following.     LE swelling: suspect due to heart failure. Neg LE dopplers.      Hypertension: Continue home Coreg and reduced lisinopril.     Total time spent with patient: 32 Minutes **I personally saw and examined the patient during this time period**                 Care Plan discussed with: Patient and Nursing Staff    Discussed:  Care Plan    Prophylaxis:  Lovenox    Disposition:  Home w/Family           ___________________________________________________    Attending Physician: Damian Lang DO

## 2021-12-28 NOTE — PROGRESS NOTES
Problem: Mobility Impaired (Adult and Pediatric)  Goal: *Acute Goals and Plan of Care (Insert Text)  Description: FUNCTIONAL STATUS PRIOR TO ADMISSION: Patient was independent and active without use of DME.    HOME SUPPORT PRIOR TO ADMISSION: The patient lived with spouse and teenage children but did not require assist.    Physical Therapy Goals  Initiated 12/23/2021  All goals with intention of spO2 >88% on least restrictive supplemental oxygen or room air. 1.  Patient will move from supine to sit and sit to supine  in bed with independence within 7 day(s). 2.  Patient will transfer from bed to chair and chair to bed with independence using the least restrictive device within 7 day(s). 3.  Patient will perform sit to stand with independence within 7 day(s). 4.  Patient will ambulate with independence for 75 feet with the least restrictive device within 7 day(s). 5. Patient will demonstrate good safety awareness with all ambulation and transfers within 7 day(s)      Outcome: Progressing Towards Goal   PHYSICAL THERAPY TREATMENT  Patient: Bella Barry (25 y.o. male)  Date: 12/28/2021  Diagnosis: Acute respiratory failure with hypoxia (HCC) [J96.01] <principal problem not specified>  Procedure(s) (LRB):  RIGHT HEART CATH (N/A)  CATH LAB US GUIDED VASCULAR ACCESS (N/A) 1 Day Post-Op  Precautions: Fall  Chart, physical therapy assessment, plan of care and goals were reviewed. ASSESSMENT  Patient continues with skilled PT services and is progressing towards goals. Communicated with nurse cleared for therapy. Patient sitting up on the recliner when received. Sit to stand SBA, ambulate without assistive device in the room and out on the 4th floor hallway SBA noted accelerated gait pattern instructed patient for slow down during ambulation for safety. Performed some active range of motion exercise on both LE all planes. Assisted back to the recliner OOB to chair as tolerated.  Educate and instructed patient to continue active range of motion exercise on both legs while up on chair or on bed multiple times. Recommend patient to be up on the chair at least 3 times a day every meal times as tolerated. Current Level of Function Impacting Discharge (mobility/balance): SBA with transfers and ambulation      Other factors to consider for discharge: none         PLAN :  Patient continues to benefit from skilled intervention to address the above impairments. Continue treatment per established plan of care. to address goals. Recommendation for discharge: (in order for the patient to meet his/her long term goals)  No skilled physical therapy/ follow up rehabilitation needs identified at this time. This discharge recommendation:  Has been made in collaboration with the attending provider and/or case management    IF patient discharges home will need the following DME: none       SUBJECTIVE:   Patient stated Saira Martin.     OBJECTIVE DATA SUMMARY:   Critical Behavior:  Neurologic State: Alert  Orientation Level: Oriented X4  Cognition: Follows commands  Safety/Judgement: Decreased awareness of need for assistance,Lack of insight into deficits,Awareness of environment  Functional Mobility Training:  Bed Mobility:  Rolling:  (already up on the chair when received)                 Transfers:  Sit to Stand: Stand-by assistance  Stand to Sit: Stand-by assistance  Stand Pivot Transfers: Stand-by assistance     Bed to Chair: Stand-by assistance                    Balance:  Sitting: Intact  Standing: Intact; Without support  Standing - Static: Fair  Standing - Dynamic : Fair  Ambulation/Gait Training:  Distance (ft): 150 Feet (ft)     Ambulation - Level of Assistance: Stand-by assistance     Gait Description (WDL): Exceptions to WDL  Gait Abnormalities: Path deviations        Base of Support: Widened     Speed/Alma: Accelerated                          Therapeutic Exercises:   Barthel Index:    Bathin  Bladder: 10  Bowels: 10  Groomin  Dressing: 10  Feeding: 10  Mobility: 15  Stairs: 0  Toilet Use: 10  Transfer (Bed to Chair and Back): 15  Total: 90/100       The Barthel ADL Index: Guidelines  1. The index should be used as a record of what a patient does, not as a record of what a patient could do. 2. The main aim is to establish degree of independence from any help, physical or verbal, however minor and for whatever reason. 3. The need for supervision renders the patient not independent. 4. A patient's performance should be established using the best available evidence. Asking the patient, friends/relatives and nurses are the usual sources, but direct observation and common sense are also important. However direct testing is not needed. 5. Usually the patient's performance over the preceding 24-48 hours is important, but occasionally longer periods will be relevant. 6. Middle categories imply that the patient supplies over 50 per cent of the effort. 7. Use of aids to be independent is allowed. Score Interpretation (from [address] [address])    Independent   60-79 Minimally independent   40-59 Partially dependent   20-39 Very dependent   <20 Totally dependent     -Usama Pimentel., Barthel, DRuthW. (1965). Functional evaluation: the Barthel Index. [address] [facility] ([address] (). The Barthel activities of daily living index: self-reporting versus actual performance in the old (> or = 75 years). Journal of [address] [place] [address] 45(7), [address] [facility], .MAYRA, Maikel Patel., Brian Lilly. (). Measuring the change in disability after inpatient rehabilitation; comparison of the responsiveness of the Barthel Index and Functional [place] Measure. Journal of Neurology, Neurosurgery, and Psychiatry, 66(4), 806-231. Beltran Ricks, N.J.A, JANNET Batista.ANDREY, & Elena Becker M.A. (2004) Assessment of post-stroke quality of life in cost-effectiveness studies:  The usefulness of the Barthel Index and the EuroQoL-5D. Quality of Life Research, 13, 427-43     Pain Ratin/10  Activity Tolerance: WNL    After treatment patient left in no apparent distress:   Supine in bed, Heels elevated for pressure relief, Call bell within reach and Side rails x 3    COMMUNICATION/COLLABORATION:   The patients plan of care was discussed with: Occupational therapist and Registered nurse. Elizabeth Ferrell, PT.WCC.    Time Calculation: 24 mins

## 2021-12-28 NOTE — PROGRESS NOTES
TRANSFER - OUT REPORT:    Verbal report given to Sharon(name) on Jaelyn Gonzalez being transferred to [facility](unit) for change in patient condition(respiratory event during moderate sedation; monitoring after reversal agents given)       Report consisted of patient's Situation, Background, Assessment and   Recommendations(SBAR). Information from the following report(s) Procedure Summary and Event Summary was reviewed with the receiving nurse. Receiving nurse to get additional SBAR report from 4th floor (previous) nurse. Opportunity for questions and clarification was provided.       Patient transported with:   O2 @ 4 liters  Registered Nurse    2 hours 1:1 observation in stress lab completed per MD.

## 2021-12-28 NOTE — PROGRESS NOTES
Patient is off unit for ADAN today with cardiology, so unable to examine at this time. Noted results of [address] and cardiology notes-- plan for ADAN today, may need possible ASD closure but complicated by significant pulm htn. Patient with pulmonary HTN, Right to left shunt, and severe RV dysfunction per Dr. Stephanie Bains' review of echo suspected due to chronic hypoxia. Discussed with CM pt's need for home o2 whenever he is discharged. Patient will need close follow up with our office.

## 2021-12-28 NOTE — PROGRESS NOTES
Nutrition Assessment     Type and Reason for Visit: Initial,RD nutrition re-screen/LOS    Nutrition Recommendations/Plan:   1. Resume oral diet as tolerated - Regular, Low Potassium  2. When oral diet resumed, provide Ensure Enlive BID to increase kcal/protein intake (700 kcal, 88 g Carbs, 40 g protein)    Nutrition Assessment:    Pt is a 49year old male admitted with Acute respiratory failure with hypoxia ([facility] [J96.01]. He  has a past medical history of Heart failure ([facility] and Hypertension. Patient coded earlier today, currently unavailable. PO intake averaging ~25% of all meals. No noted N/V/D. No noted chewing/swallowing problems. NKFA. Weight appears stable this calendar year. Currently on 4L/min. RD to follow up tomorrow to attempt meeting with patient. Wt Readings from Last 10 Encounters:   12/27/21 78 kg (171 lb 15.3 oz)   12/28/21 78 kg (171 lb 15.3 oz)   05/25/21 78.5 kg (173 lb)       Malnutrition Assessment:  Malnutrition Status: Insufficient data  - unable to physically assess patient at this time    Estimated Daily Nutrient Needs:  Energy (kcal):  2051 (MSJ x 1.1 x 1.2)  Protein (g):  63-78 (0.8-1.0)       Fluid (ml/day):  2051    Nutrition Related Findings:   ABD: Distended, fair appetite and active bowel sounds 12/28  Last BM: 12/27  Edema: +1 non-pitting BLE noted 12/28    Nutr. Labs:  Lab Results   Component Value Date/Time    GFR est AA >60 12/28/2021 02:56 AM    GFR est non-AA >60 12/28/2021 02:56 AM    Creatinine 0.87 12/28/2021 02:56 AM    BUN 16 12/28/2021 02:56 AM    Sodium 138 12/28/2021 02:56 AM    Potassium 5.2 (H) 12/28/2021 02:56 AM    Chloride 93 (L) 12/28/2021 02:56 AM    CO2 39 (H) 12/28/2021 02:56 AM     Lab Results   Component Value Date/Time    Glucose 91 12/28/2021 02:56 AM     No results found for: HBA1C, UIT8VUHP, IVS3JLQS, RRE5AOMQ    Nutr.  Meds:  Coreg, Lovenox, Miralax PRN    Current Nutrition Therapies:  DIET NPO    Anthropometric Measures:  · Height:  5' 7\" (170.2 cm)  · Current Body Wt:  78 kg (171 lb 15.3 oz)  · BMI: 26.9    Nutrition Diagnosis:   · Inadequate oral intake related to impaired respiratory function as evidenced by intake 0-25%    Nutrition Intervention:  Food and/or Nutrient Delivery: Start oral diet,Start oral nutrition supplement  Nutrition Education and Counseling: No recommendations at this time  Coordination of Nutrition Care: Continue to monitor while inpatient,Interdisciplinary rounds    Goals:  Provide >/= 80% estimated protein needs within 1 - 2 days       Nutrition Monitoring and Evaluation:   Behavioral-Environmental Outcomes: None identified  Food/Nutrient Intake Outcomes: Food and nutrient intake,Supplement intake  Physical Signs/Symptoms Outcomes: Biochemical data,Weight    Discharge Planning:     Too soon to determine     Electronically signed by Venkat Escobar RD on 35/92/0166  Contact Number: [phone] or via [employer]

## 2021-12-28 NOTE — PROGRESS NOTES
CARDIOLOGY PROGRESS NOTE        [address], [place], [address] [facility]  Phone [phone]; Fax [phone]          2021 9:11 AM       Admit Date:           2021  Admit Diagnosis:  Acute respiratory failure with hypoxia ([facility] [J96.01]  :          1965   MRN:          [id number]        Assessment/Plan  1. Hypoxia:  suspect hypoxia has progressed due to increased shunting related to pulmonary hypertension> Will need home oxygen assessment dan HUNTER to arrange home O2 prior to discharge. On nasal canula 4 liters now,Bi pap at sleep. 2.  Hypercapnia  3. Pulm htn: cont coreg, added isordil. 4.  RV dysfunction: mod to severe on echo. ADAN today ? ASD. May benefit from probable ASD closure however his severe pulmonary hypertension will complicate decision due to long term needs supplemental oxygen. 5.  R--> L shunt  6.  hyperkalemia: stop ACE, start isordil 10 mg BID       CARDIOLOGY ATTENDING  Patient personally seen and examined. All the elements of history and examination were personally performed. Assessment and plan was discussed and agree. Says he felt fine prior to ADAN. Hrt RRR, no murmur. Lungs clear    He had respiratory arrest with sedation for ADAN (see ADAN note) which we were able to reverse with flumazenil and narcan. ADAN was aborted. The next step to evaluate for ASD will be a cardiac MRI. Will stop isordil as BP runs low (coreg cut back as well). He has been unable to tolerate diuresis as he becomes hypotensive. Edwina Harkins MD, [facility]                       We discussed the expected course, resolution and complications of the diagnosis(es) in detail. No intake/output data recorded.     Last 3 Recorded Weights in this Encounter    21 0342 21 0357 21 0636   Weight: 79 kg (174 lb 2.6 oz) 80.4 kg (177 lb 4 oz) 78 kg (171 lb 15.3 oz)         1901 -  0700  In: 1000 [P.O.:1000]  Out: 975 [Urine:975]    SUBJECTIVE             Cj Rios reports feeling hungry. Does not have good insight into his disease process       Current Facility-Administered Medications   Medication Dose Route Frequency    carvediloL (COREG) tablet 12.5 mg  12.5 mg Oral BID WITH MEALS    lisinopriL (PRINIVIL, ZESTRIL) tablet 2.5 mg  2.5 mg Oral DAILY    LORazepam (ATIVAN) injection 0.5 mg  0.5 mg IntraVENous BID PRN    sodium chloride (NS) flush 5-40 mL  5-40 mL IntraVENous Q8H    sodium chloride (NS) flush 5-40 mL  5-40 mL IntraVENous PRN    acetaminophen (TYLENOL) tablet 650 mg  650 mg Oral Q6H PRN    Or    acetaminophen (TYLENOL) suppository 650 mg  650 mg Rectal Q6H PRN    polyethylene glycol (MIRALAX) packet 17 g  17 g Oral DAILY PRN    ondansetron (ZOFRAN ODT) tablet 4 mg  4 mg Oral Q8H PRN    Or    ondansetron (ZOFRAN) injection 4 mg  4 mg IntraVENous Q6H PRN    enoxaparin (LOVENOX) injection 40 mg  40 mg SubCUTAneous DAILY      OBJECTIVE               Intake/Output Summary (Last 24 hours) at 12/28/2021 0805  Last data filed at 12/27/2021 2223  Gross per 24 hour   Intake 1000 ml   Output 975 ml   Net 25 ml       Review of Systems - History obtained from the patient AS PER  HPI        PHYSICAL EXAM        Visit Vitals  /74 (BP 1 Location: Left upper arm, BP Patient Position: At rest)   Pulse 97   Temp 98.9 °F (37.2 °C)   Resp 19   Ht 5' 7\" (1.702 m)   Wt 78 kg (171 lb 15.3 oz)   SpO2 98%   BMI 26.93 kg/m²       Gen: Well-developed, well-nourished, in no acute distress  alert and oriented x 3  HEENT:  Pink conjunctivae, Hearing grossly normal.  No scleral icterus or conjunctival, moist mucous membranes  Neck: Supple,  No JVD  Resp: No accessory muscle use, diminished bases   Card: Regular Rate, Rythm,  Normal S1, S2, No murmurs, rubs or gallop.    MSK: No cyanosis or clubbing, good capillary refill  Skin: No rashes or ulcers, no bruising  Neuro:  Moving all four extremities, no focal deficit, follows commands appropriately  Psych:  Poor insight, oriented to person, place and time, alert, Nml Affect  LE: + 1 LE edema w/ erythema to ankles       DATA REVIEW      Records received from ANJANA Toribio  Echo 2016 ef 40-45%  Ef improved w GDMT  Echo 2017 EF 55%  Normal nuclear stress test  2017     Brother  from CHF in his 45s   Father AA s/p repair at age 77 yo    Cardiac monitor: SR         Laboratory and Imaging have been reviewed by me and are notable for  No results for input(s): CPK, CKMB, TROIQ in the last 72 hours.   Recent Labs     21  0256 21  1511    140   K 5.2* 5.0   CO2 39* 43*   BUN 16 18   CREA 0.87 0.93   GLU 91 174*   WBC 6.3 7.9   HGB 17.0 16.0   HCT 53.9* 50.4*    234             Kinsey Segura, VIVIANA

## 2021-12-28 NOTE — Clinical Note
1131 - moderate sedation started w/ 1mg versed and 50mcg fentanyl. 1135 - pt's SpO2 level decreasing. 1136 - NRB mask applied to pt, O2 15L; IV bolus initiated. Jaw thrust performed. 1137 - Ambu bag applied and respiratory support given. 0.4mg narcan given per Dr. Ernestina Barrios order. RRT called for no improvement in respiratory status. 1139 - Pt's SpO2 improving to 80%. 1mg romazicon given per MD order. 1140 - pt's eyes began opening and hands moving, responding to his name being called. Pt converted to NRB mask for SpO2 of 90%. 1145 - pt's VSS at baseline. Pt responding appropriately. Dr. Micha Cisneros at bedside observing pt. New orders received to transfer pt to telemetry bed (higher level of care).
Dr. Ryne Schwartz at bedside assessing pt and discussing plan with patient.
History and physical documented and up to date, allergies reviewed, lab results reviewed, pre-procedure education provided, patient verbalized understanding of procedure, procedural consent signed and patient is NPO.
ID band present and verified. Family is not present.
Physician has arrived.
Pt tolerating ice chips; return of gag reflex. Pt states he feels the ice and senses the cold water while swallowing.
RUQ, RLQ TTP. Diffusely distended. Bowel sounds present.

## 2021-12-28 NOTE — PROGRESS NOTES
Spiritual Care Assessment/Progress Note  [address]      NAME: Jenna Gamez      MRN: [id number]  AGE: 49 y.o. SEX: male  Catholic Affiliation: Advent   Language: English     12/28/2021     Total Time (in minutes): 10     Spiritual Assessment begun in [facility] 4M POST SURG ORT 1 through conversation with:         []Patient        [] Family    [] Friend(s)        Reason for Consult: Code Blue/Arthur     Spiritual beliefs: (Please include comment if needed)     [] Identifies with a patric tradition:         [] Supported by a patric community:            [] Claims no spiritual orientation:           [] Seeking spiritual identity:                [] Adheres to an individual form of spirituality:           [x] Not able to assess:                           Identified resources for coping:      [] Prayer                               [] Music                  [] Guided Imagery     [] Family/friends                 [] Pet visits     [] Devotional reading                         [] Unknown     [] Other:                                              Interventions offered during this visit: (See comments for more details)    Patient Interventions: Crisis           Plan of Care:     [] Support spiritual and/or cultural needs    [] Support AMD and/or advance care planning process      [] Support grieving process   [] Coordinate Rites and/or Rituals    [] Coordination with community clergy   [] No spiritual needs identified at this time   [] Detailed Plan of Care below (See Comments)  [] Make referral to Music Therapy  [] Make referral to Pet Therapy     [] Make referral to Addiction services  [] Make referral to [facility]  [] Make referral to Spiritual Care Partner  [] No future visits requested        [] Contact Spiritual Care for further referrals     Comments: Responded to Code Blue called for Mr Janna Aguilar in Nuclear Med. Multiple staff members were attending to patient; no family was present.  Please notify  if support desired. : Rev. Taras Blandon.  Jason Ortiz; [facility], to contact [phone] [address] call: [phone]

## 2021-12-28 NOTE — PROGRESS NOTES
[facility]  [address] [facility], [address] [place] [address]  [phone]      Medical Progress Note      NAME: Thomas Pringle   :  1965  MRM:  [id number]    Date/Time of service: 2021         Subjective:     Chief Complaint:  Patient was personally seen and examined by me during this time period. Chart reviewed. F/u acute resp failure. Remains on bipap at night; nc during day. Some dyspnea with exertion, no chest pain. Attempted ADAN today; but, after receiving 1mg versed and 50 mcg Fentanyl patient's oxygen and blood pressure dropped; BP quickly improved with reversal flumazenil and narcan. Unable to complete ADAN. Objective:       Vitals:       Last 24hrs VS reviewed since prior progress note.  Most recent are:    Visit Vitals  /83 (BP 1 Location: Right upper arm, BP Patient Position: At rest)   Pulse 89   Temp 97.5 °F (36.4 °C)   Resp 16   Ht 5' 7\" (1.702 m)   Wt 78 kg (171 lb 15.3 oz)   SpO2 99%   BMI 26.93 kg/m²     SpO2 Readings from Last 6 Encounters:   21 99%   21 95%   21 93%    O2 Flow Rate (L/min): 4 l/min       Intake/Output Summary (Last 24 hours) at 2021 1450  Last data filed at 2021 2223  Gross per 24 hour   Intake 640 ml   Output 975 ml   Net -335 ml        Exam:     Physical Exam:    Gen:  Well-developed, well-nourished, in no acute distress  HEENT:  Pink conjunctivae, PERRL, hearing intact to voice  Resp:  No accessory muscle use, clear breath sounds without wheezes rales or rhonchi  Card:  RRR, No murmurs, normal S1, S2, b/l peripheral edema  Abd:  Soft, non-tender, non-distended, normoactive bowel sounds are present  Musc:  No cyanosis or clubbing  Skin:  No rashes or ulcers, skin turgor is good  Neuro:  Cranial nerves 3-12 are grossly intact,  follows commands appropriately  Psych:  Oriented to person, place, and time, Alert with good insight      Medications Reviewed: (see below)    Lab Data Reviewed: (see below)    ______________________________________________________________________    Medications:     Current Facility-Administered Medications   Medication Dose Route Frequency    carvediloL (COREG) tablet 12.5 mg  12.5 mg Oral BID WITH MEALS    LORazepam (ATIVAN) injection 0.5 mg  0.5 mg IntraVENous BID PRN    sodium chloride (NS) flush 5-40 mL  5-40 mL IntraVENous Q8H    sodium chloride (NS) flush 5-40 mL  5-40 mL IntraVENous PRN    acetaminophen (TYLENOL) tablet 650 mg  650 mg Oral Q6H PRN    Or    acetaminophen (TYLENOL) suppository 650 mg  650 mg Rectal Q6H PRN    polyethylene glycol (MIRALAX) packet 17 g  17 g Oral DAILY PRN    ondansetron (ZOFRAN ODT) tablet 4 mg  4 mg Oral Q8H PRN    Or    ondansetron (ZOFRAN) injection 4 mg  4 mg IntraVENous Q6H PRN    enoxaparin (LOVENOX) injection 40 mg  40 mg SubCUTAneous DAILY     Facility-Administered Medications Ordered in Other Encounters   Medication Dose Route Frequency    benzocaine (HURRICANE) 20 % spray   Mucous Membrane Rad Multiple    0.9% NaCl bacteriostatic (NORMAL SALINE) 0.9 % injection 10 mL  10 mL IntraVENous Rad Multiple    lidocaine (XYLOCAINE) 5 % ointment   Topical Rad Multiple    lidocaine (XYLOCAINE) 2 % viscous solution 15 mL  15 mL Mouth/Throat RAD PRN    lidocaine (URO-JET/ GLYDO) 2 % jelly   Mucous Membrane RAD ONCE    midazolam (VERSED) injection 0.5-10 mg  0.5-10 mg IntraVENous Rad Multiple    fentaNYL citrate (PF) injection  mcg   mcg IntraVENous Rad Multiple          Lab Review:     Recent Labs     12/28/21  0256 12/26/21  1511   WBC 6.3 7.9   HGB 17.0 16.0   HCT 53.9* 50.4*    234     Recent Labs     12/28/21  0256 12/26/21  1511    140   K 5.2* 5.0   CL 93* 92*   CO2 39* 43*   GLU 91 174*   BUN 16 18   CREA 0.87 0.93   CA 8.5 8.5   ALB 3.1*  --    TBILI 1.1*  --    ALT 42  --      No results found for: GLUCPOC       Assessment / Plan:     Acute respiratory failure with hypoxia and hypercapnia ([facility] (12/22/2021)/ dyspnea: Unclear etiology cardiac verus pulmonary; 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 CTA chest negative for PE. Respiratory viral panel neg.  12/22 ABG with hypoxia and hypercapnia. neg UDS. Bipap at night; NC during day. possible trilegy OP. Daily ABGs. Pulm following.       Acute on chronic heart failure ([facility] (12/22/2021)/ Hx of cardiomyopathy: Unclear trigger. proBNP elevated to 844 but ct w/ trace effusions. troponins ok. Echo w/ concern for shunt. S/p RHC 12/27. Attempted ADAN 12/28 to confirm shunt; but, after receiving 1mg versed and 50 mcg Fentanyl patient's oxygen and blood pressure dropped; BP quickly improved with reversal flumazenil and narcan. Discussed with cardiology; plan to possibly attempt cardiac MRI tomorrow if patient can tolerate.     LE swelling: suspect due to heart failure. Neg LE dopplers.      Hypertension: Continue home Coreg and reduced lisinopril.     Total time spent with patient: 32 Minutes **I personally saw and examined the patient during this time period**                 Care Plan discussed with: Patient and Nursing Staff    Discussed:  Care Plan    Prophylaxis:  Lovenox    Disposition:  Home w/Family           ___________________________________________________    Attending Physician: Bre Hurtado DO

## 2021-12-28 NOTE — PROGRESS NOTES
Patient AOX3 on 4LNC. Patient denies pain or discomfort at the moment. Report given to receiving nurse for transfer. Patient left the unit with all belongings including cell phone, , laptop and  and glasses.

## 2021-12-28 NOTE — H&P
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 He notes worsening SOB over the past several months. He denies any significant change in mild LE edema. Denies change in weight. He has never smoked. Denies h/o lung disease. Denies cough or wheezing. Denies fevers. Does note intermittent chills. Pt states that he does have some snoring, but denies apnea. He does awaken refreshed, but does endorse some daytime somnolence. Interval history:  12/28- Noted events prior to attempt at ADAN earlier today-- pt with unresponsivness and hypotension and worsening hypoxia following versed and fentanyl, reversed with flumazenil and narcan. He has been transferred to telemetry floor for closer monitoring. Back on 4LNC o2. He denies any complaints. Past Medical History:   Diagnosis Date    Heart failure ([address]     Hypertension       Past Surgical History:   Procedure Laterality Date    HX ORTHOPAEDIC        Prior to Admission medications    Medication Sig Start Date End Date Taking? Authorizing Provider   carvediloL (COREG) 25 mg tablet Take 25 mg by mouth two (2) times daily (with meals). Yes Provider, Historical   furosemide (Lasix) 20 mg tablet Take 20 mg by mouth daily as needed. Yes Provider, Historical   lisinopriL (PRINIVIL, ZESTRIL) 5 mg tablet Take 5 mg by mouth daily. Yes Provider, Historical   ascorbic acid, vitamin C, (Vitamin C) 500 mg tablet Take 500 mg by mouth daily. Yes Provider, Historical   therapeutic multivitamin (THERAGRAN) tablet Take 1 Tablet by mouth daily. Yes Provider, Historical   cyanocobalamin (Vitamin B-12) 100 mcg tablet Take 100 mcg by mouth daily.    Yes Provider, Historical     No Known Allergies   Social History     Tobacco Use    Smoking status: Never Smoker    Smokeless tobacco: Never Used Substance Use Topics    Alcohol use: Yes     Comment: socially       Family History   Problem Relation Age of Onset    Heart Disease Brother         Review of Systems   Constitutional: Negative for activity change, appetite change, chills, fever and unexpected weight change. HENT: Negative for congestion. Respiratory: Negative for apnea, cough, chest tightness, shortness of breath and wheezing. Cardiovascular: Positive for leg swelling. Negative for chest pain. Gastrointestinal: Negative for abdominal distention. Endocrine: Negative for cold intolerance. Genitourinary: Negative for difficulty urinating. Musculoskeletal: Negative for back pain. Allergic/Immunologic: Negative for environmental allergies. Neurological: Negative for dizziness. Hematological: Negative for adenopathy. Psychiatric/Behavioral: Negative for agitation. Objective:     Blood pressure 118/83, pulse 89, temperature 97.5 °F (36.4 °C), resp. rate 16, height 5' 7\" (1.702 m), weight 78 kg (171 lb 15.3 oz), SpO2 99 %. Temp (24hrs), Av.1 °F (36.7 °C), Min:97.5 °F (36.4 °C), Max:98.9 °F (37.2 °C)        No intake/output data recorded.  1901 -  0700  In: 1000 [P.O.:1000]  Out: 975 [Urine:975]    Physical Exam  Constitutional:       Appearance: Normal appearance. He is normal weight. HENT:      Head: Normocephalic and atraumatic. Right Ear: External ear normal.      Left Ear: External ear normal.      Nose: Nose normal.      Mouth/Throat:      Mouth: Mucous membranes are dry. Eyes:      Extraocular Movements: Extraocular movements intact. Cardiovascular:      Rate and Rhythm: Normal rate and regular rhythm. Heart sounds: Normal heart sounds. Pulmonary:      Effort: Pulmonary effort is normal.      Breath sounds: Normal breath sounds. Musculoskeletal:      Cervical back: No tenderness. Skin:     General: Skin is warm and dry. Neurological:      Mental Status: He is alert. Comments: Awake and alert   Psychiatric:         Mood and Affect: Mood normal.         Behavior: Behavior normal.       Data Review:   Recent Results (from the past 24 hour(s))   CBC WITH AUTOMATED DIFF    Collection Time: 12/28/21  2:56 AM   Result Value Ref Range    WBC 6.3 4.1 - 11.1 K/uL    RBC 5.32 4.10 - 5.70 M/uL    HGB 17.0 12.1 - 17.0 g/dL    HCT 53.9 (H) 36.6 - 50.3 %    .3 (H) 80.0 - 99.0 FL    MCH 32.0 26.0 - 34.0 PG    MCHC 31.5 30.0 - 36.5 g/dL    RDW 13.8 11.5 - 14.5 %    PLATELET 672 946 - 882 K/uL    MPV 9.6 8.9 - 12.9 FL    NRBC 0.0 0  WBC    ABSOLUTE NRBC 0.00 0.00 - 0.01 K/uL    NEUTROPHILS 73 32 - 75 %    LYMPHOCYTES 14 12 - 49 %    MONOCYTES 10 5 - 13 %    EOSINOPHILS 2 0 - 7 %    BASOPHILS 0 0 - 1 %    IMMATURE GRANULOCYTES 1 (H) 0.0 - 0.5 %    ABS. NEUTROPHILS 4.7 1.8 - 8.0 K/UL    ABS. LYMPHOCYTES 0.9 0.8 - 3.5 K/UL    ABS. MONOCYTES 0.6 0.0 - 1.0 K/UL    ABS. EOSINOPHILS 0.1 0.0 - 0.4 K/UL    ABS. BASOPHILS 0.0 0.0 - 0.1 K/UL    ABS. IMM. GRANS. 0.0 0.00 - 0.04 K/UL    DF AUTOMATED     METABOLIC PANEL, COMPREHENSIVE    Collection Time: 12/28/21  2:56 AM   Result Value Ref Range    Sodium 138 136 - 145 mmol/L    Potassium 5.2 (H) 3.5 - 5.1 mmol/L    Chloride 93 (L) 97 - 108 mmol/L    CO2 39 (H) 21 - 32 mmol/L    Anion gap 6 5 - 15 mmol/L    Glucose 91 65 - 100 mg/dL    BUN 16 6 - 20 MG/DL    Creatinine 0.87 0.70 - 1.30 MG/DL    BUN/Creatinine ratio 18 12 - 20      GFR est AA >60 >60 ml/min/1.73m2    GFR est non-AA >60 >60 ml/min/1.73m2    Calcium 8.5 8.5 - 10.1 MG/DL    Bilirubin, total 1.1 (H) 0.2 - 1.0 MG/DL    ALT (SGPT) 42 12 - 78 U/L    AST (SGOT) 42 (H) 15 - 37 U/L    Alk.  phosphatase 69 45 - 117 U/L    Protein, total 6.2 (L) 6.4 - 8.2 g/dL    Albumin 3.1 (L) 3.5 - 5.0 g/dL    Globulin 3.1 2.0 - 4.0 g/dL    A-G Ratio 1.0 (L) 1.1 - 2.2     BLOOD GAS, ARTERIAL    Collection Time: 12/28/21  4:55 AM   Result Value Ref Range    pH 7.37 7.35 - 7.45      PCO2 75 (H) 35 - 45 mmHg PO2 112 (H) 80 - 100 mmHg    O2 SAT 98 (H) 92 - 97 %    BICARBONATE 42 (H) 22 - 26 mmol/L    BASE EXCESS 12.6 mmol/L    O2 METHOD BIPAP      FIO2 35 %    MODE BIPAP      SET RATE 22      IPAP/PIP 18      PEEP/CPAP 8.0      Sample source ARTERIAL      SITE LEFT RADIAL      LILLIAM'S TEST YES       CTA chest: FINDINGS:  THYROID: No nodule. MEDIASTINUM: No mass or lymphadenopathy. RACHEL: No mass or lymphadenopathy. THORACIC AORTA: No dissection or aneurysm. PULMONARY ARTERIES: Normal in caliber. The pulmonary arteries are well enhanced. No evidence of pulmonary embolus. TRACHEA/BRONCHI: Patent. ESOPHAGUS: No wall thickening or dilatation. HEART: Normal in size. PLEURA: Trace right pleural effusion. No left pleural effusion. LUNGS: There is mild bibasilar atelectasis. No nodule, mass, or airspace  disease. INCIDENTALLY IMAGED UPPER ABDOMEN: No focal abnormality. BONES: There is moderate dextroconvex scoliosis of the thoracic spine. A  Greco tania is in place.     IMPRESSION  1. Negative for pulmonary embolus. 2. Trace right pleural effusion with mild bibasilar atelectasis. .      Assessment:   Pulmonary HTN s/p RHC on 12/27  ? Acute, but suspected chronic respiratory failure with hypoxia and hypercapnia-- currently on 4LNC o2  Klippel-Feil syndrome s/p extensive back surgery, suspect underlying spinal deformities contributing to resp failure. Mild Volume OL  H/o CHF  ? PFO on TTE, right to left shunt   Severe RV dysfunction noted on echo per Dr. Ting Valdez' review, suspected due to chronic hypoxia  Suspected GABY      Plan:   supplemental for goal sats >90%. He will need home o2 set up prior to discharge-- discussed with CM. NIV at night while inpatient. Recommend outpatient sleep study for suspected GABY. Encourage OOB/IS  Recommend outpt PFTs. Will arrange follow up in our pulmonary and sleep clinics. Undergoing evaluation for possible ASD closure by cardiology. Plan for cardiac MRI.  ADAN was aborted. Coreg, prn diuresis as per cardiology. Discussed case with Dr. Bob Hernandez.

## 2021-12-29 ENCOUNTER — APPOINTMENT (OUTPATIENT)
Dept: MRI IMAGING | Age: 56
DRG: 286 | End: 2021-12-29
Attending: NURSE PRACTITIONER
Payer: COMMERCIAL

## 2021-12-29 LAB
ALBUMIN SERPL-MCNC: 2.9 G/DL (ref 3.5–5)
ALBUMIN/GLOB SERPL: 0.9 {RATIO} (ref 1.1–2.2)
ALP SERPL-CCNC: 72 U/L (ref 45–117)
ALT SERPL-CCNC: 40 U/L (ref 12–78)
ANION GAP SERPL CALC-SCNC: 1 MMOL/L (ref 5–15)
AST SERPL-CCNC: 29 U/L (ref 15–37)
BASOPHILS # BLD: 0 K/UL (ref 0–0.1)
BASOPHILS NFR BLD: 1 % (ref 0–1)
BILIRUB SERPL-MCNC: 0.6 MG/DL (ref 0.2–1)
BUN SERPL-MCNC: 15 MG/DL (ref 6–20)
BUN/CREAT SERPL: 18 (ref 12–20)
CALCIUM SERPL-MCNC: 8 MG/DL (ref 8.5–10.1)
CHLORIDE SERPL-SCNC: 95 MMOL/L (ref 97–108)
CO2 SERPL-SCNC: 42 MMOL/L (ref 21–32)
CREAT SERPL-MCNC: 0.85 MG/DL (ref 0.7–1.3)
DIFFERENTIAL METHOD BLD: ABNORMAL
EOSINOPHIL # BLD: 0.1 K/UL (ref 0–0.4)
EOSINOPHIL NFR BLD: 1 % (ref 0–7)
ERYTHROCYTE [DISTWIDTH] IN BLOOD BY AUTOMATED COUNT: 13.6 % (ref 11.5–14.5)
GLOBULIN SER CALC-MCNC: 3.2 G/DL (ref 2–4)
GLUCOSE SERPL-MCNC: 186 MG/DL (ref 65–100)
HCT VFR BLD AUTO: 51.3 % (ref 36.6–50.3)
HGB BLD-MCNC: 15.7 G/DL (ref 12.1–17)
IMM GRANULOCYTES # BLD AUTO: 0 K/UL (ref 0–0.04)
IMM GRANULOCYTES NFR BLD AUTO: 0 % (ref 0–0.5)
LYMPHOCYTES # BLD: 0.9 K/UL (ref 0.8–3.5)
LYMPHOCYTES NFR BLD: 14 % (ref 12–49)
MAGNESIUM SERPL-MCNC: 2.5 MG/DL (ref 1.6–2.4)
MCH RBC QN AUTO: 31.8 PG (ref 26–34)
MCHC RBC AUTO-ENTMCNC: 30.6 G/DL (ref 30–36.5)
MCV RBC AUTO: 103.8 FL (ref 80–99)
MONOCYTES # BLD: 0.6 K/UL (ref 0–1)
MONOCYTES NFR BLD: 10 % (ref 5–13)
NEUTS SEG # BLD: 4.7 K/UL (ref 1.8–8)
NEUTS SEG NFR BLD: 74 % (ref 32–75)
NRBC # BLD: 0 K/UL (ref 0–0.01)
NRBC BLD-RTO: 0 PER 100 WBC
PHOSPHATE SERPL-MCNC: 4.6 MG/DL (ref 2.6–4.7)
PLATELET # BLD AUTO: 194 K/UL (ref 150–400)
PMV BLD AUTO: 9.9 FL (ref 8.9–12.9)
POTASSIUM SERPL-SCNC: 4.4 MMOL/L (ref 3.5–5.1)
PROT SERPL-MCNC: 6.1 G/DL (ref 6.4–8.2)
RBC # BLD AUTO: 4.94 M/UL (ref 4.1–5.7)
SODIUM SERPL-SCNC: 138 MMOL/L (ref 136–145)
WBC # BLD AUTO: 6.3 K/UL (ref 4.1–11.1)

## 2021-12-29 PROCEDURE — 74011250637 HC RX REV CODE- 250/637: Performed by: STUDENT IN AN ORGANIZED HEALTH CARE EDUCATION/TRAINING PROGRAM

## 2021-12-29 PROCEDURE — 36415 COLL VENOUS BLD VENIPUNCTURE: CPT

## 2021-12-29 PROCEDURE — 74011250637 HC RX REV CODE- 250/637: Performed by: NURSE PRACTITIONER

## 2021-12-29 PROCEDURE — 65660000000 HC RM CCU STEPDOWN

## 2021-12-29 PROCEDURE — 75557 CARDIAC MRI FOR MORPH: CPT

## 2021-12-29 PROCEDURE — 99233 SBSQ HOSP IP/OBS HIGH 50: CPT | Performed by: STUDENT IN AN ORGANIZED HEALTH CARE EDUCATION/TRAINING PROGRAM

## 2021-12-29 PROCEDURE — 85025 COMPLETE CBC W/AUTO DIFF WBC: CPT

## 2021-12-29 PROCEDURE — 94660 CPAP INITIATION&MGMT: CPT

## 2021-12-29 PROCEDURE — APPSS30 APP SPLIT SHARED TIME 16-30 MINUTES: Performed by: NURSE PRACTITIONER

## 2021-12-29 PROCEDURE — 97530 THERAPEUTIC ACTIVITIES: CPT

## 2021-12-29 PROCEDURE — 84100 ASSAY OF PHOSPHORUS: CPT

## 2021-12-29 PROCEDURE — 80053 COMPREHEN METABOLIC PANEL: CPT

## 2021-12-29 PROCEDURE — 83735 ASSAY OF MAGNESIUM: CPT

## 2021-12-29 PROCEDURE — 74011250636 HC RX REV CODE- 250/636: Performed by: INTERNAL MEDICINE

## 2021-12-29 PROCEDURE — 97116 GAIT TRAINING THERAPY: CPT

## 2021-12-29 RX ORDER — HYDRALAZINE HYDROCHLORIDE 25 MG/1
25 TABLET, FILM COATED ORAL 2 TIMES DAILY
Status: DISCONTINUED | OUTPATIENT
Start: 2021-12-29 | End: 2021-12-30 | Stop reason: HOSPADM

## 2021-12-29 RX ADMIN — HYDRALAZINE HYDROCHLORIDE 25 MG: 25 TABLET, FILM COATED ORAL at 17:38

## 2021-12-29 RX ADMIN — ENOXAPARIN SODIUM 40 MG: 100 INJECTION SUBCUTANEOUS at 08:26

## 2021-12-29 RX ADMIN — Medication 10 ML: at 17:39

## 2021-12-29 RX ADMIN — CARVEDILOL 12.5 MG: 12.5 TABLET, FILM COATED ORAL at 17:38

## 2021-12-29 RX ADMIN — Medication 10 ML: at 23:01

## 2021-12-29 RX ADMIN — Medication 10 ML: at 05:16

## 2021-12-29 RX ADMIN — CARVEDILOL 12.5 MG: 12.5 TABLET, FILM COATED ORAL at 08:25

## 2021-12-29 NOTE — PROGRESS NOTES
Provided pastoral care visit to [facility] patient. Did not include sacramental care.     Dorothea Hurtado

## 2021-12-29 NOTE — ROUTINE PROCESS
Bedside and Verbal shift change report given to Loki Melendez (oncoming nurse) by Edwin (offgoing nurse). Report included the following information SBAR, Kardex, ED Summary, Procedure Summary, Intake/Output, MAR, Recent Results and Cardiac Rhythm NSR.

## 2021-12-29 NOTE — PROGRESS NOTES
Nutrition Note    Brief follow up. Patient states appetite is good, denies recent weight or appetite changes. States -170#. No N/V/D at this time.   Patient voiced acceptance of Ensure Enlive BID>    Malnutrition Assessment:  Malnutrition Status:  No malnutrition    Context:  Acute illness     Findings of the 6 clinical characteristics of malnutrition:   Energy Intake:  Mild decrease in energy intake (specify) (~50% x 3 days)  Weight Loss:  No significant weight loss     Body Fat Loss:  No significant body fat loss,     Muscle Mass Loss:  No significant muscle mass loss,    Fluid Accumulation:  No significant fluid accumulation,     Strength:  Not performed       Patient Vitals for the past 168 hrs:   % Diet Eaten   12/27/21 1948 0%   12/27/21 1739 26 - 50%   12/27/21 1300 0%   12/27/21 0445 0%   12/26/21 1844 1 - 25%   12/26/21 1355 1 - 25%   12/26/21 1054 76 - 100%   12/23/21 1302 76 - 100%       Electronically signed by Madelaine Saavedra RD on 53/12/0657   Contact: [phone] or via [employer]

## 2021-12-29 NOTE — PROGRESS NOTES
Problem: Mobility Impaired (Adult and Pediatric)  Goal: *Acute Goals and Plan of Care (Insert Text)  Description: FUNCTIONAL STATUS PRIOR TO ADMISSION: Patient was independent and active without use of DME.    HOME SUPPORT PRIOR TO ADMISSION: The patient lived with spouse and teenage children but did not require assist.    Physical Therapy Goals  Initiated 12/23/2021  All goals with intention of spO2 >88% on least restrictive supplemental oxygen or room air. 1.  Patient will move from supine to sit and sit to supine  in bed with independence within 7 day(s). 2.  Patient will transfer from bed to chair and chair to bed with independence using the least restrictive device within 7 day(s). 3.  Patient will perform sit to stand with independence within 7 day(s). 4.  Patient will ambulate with independence for 75 feet with the least restrictive device within 7 day(s). 5. Patient will demonstrate good safety awareness with all ambulation and transfers within 7 day(s)      Note:   PHYSICAL THERAPY TREATMENT  Patient: Candie Colunga (8 y.o. male)  Date: 12/29/2021  Diagnosis: Acute respiratory failure with hypoxia (HCC) [J96.01] <principal problem not specified>  Procedure(s) (LRB):  RIGHT HEART CATH (N/A)  CATH LAB US GUIDED VASCULAR ACCESS (N/A) 2 Days Post-Op  Precautions: Fall  Chart, physical therapy assessment, plan of care and goals were reviewed. ASSESSMENT  Patient continues with skilled PT services. Pt supine to sit with CGA. Pt CGA sit to stand. Pt ambulated 90ft with no device CGA on 4L of  O2. Pts balance is fair to good on level surface. Pt back to bed with CGA. Pt progressing with mobility. Continue goals. PLAN :  Patient continues to benefit from skilled intervention to address the above impairments. Continue treatment per established plan of care. to address goals.     Recommendation for discharge: (in order for the patient to meet his/her long term goals)  No skilled physical therapy/ follow up rehabilitation needs identified at this time.     This discharge recommendation:  Has been made in collaboration with the attending provider and/or case management    IF patient discharges home will need the following DME: none       SUBJECTIVE:       OBJECTIVE DATA SUMMARY:   Critical Behavior:  Neurologic State: Alert  Orientation Level: Oriented X4  Cognition: Follows commands  Safety/Judgement: Decreased awareness of need for assistance,Lack of insight into deficits,Awareness of environment  Functional Mobility Training:  Bed Mobility:     Supine to Sit: Contact guard assistance  Sit to Supine: Contact guard assistance           Transfers:  Sit to Stand: Contact guard assistance  Stand to Sit: Contact guard assistance                             Balance:  Sitting: Intact  Standing - Static: Fair  Ambulation/Gait Training:  Distance (ft): 90 Feet (ft)  Assistive Device: Gait belt (no device)  Ambulation - Level of Assistance: Contact guard assistance        Gait Abnormalities: Decreased step clearance        Base of Support: Narrowed     Speed/Alma: Pace decreased (<100 feet/min)  Step Length: Right shortened;Left shortened                    Activity Tolerance:   Fair to good    After treatment patient left in no apparent distress:   Sitting in chair    COMMUNICATION/COLLABORATION:   The patients plan of care was discussed with: Physical therapist.     Erin Huntley PTA   Time Calculation: 23 mins

## 2021-12-29 NOTE — PROGRESS NOTES
0700: Bedside and Verbal shift change report given to Fern Gonzalez (oncoming nurse) by Tyrel Kenny (offgoing nurse). Report included the following information SBAR, Kardex, Accordion and Cardiac Rhythm NSR. This patient was assisted with Intentional Toileting every 2 hours during this shift as appropriate. Documentation of ambulation and output reflected on Flowsheet as appropriate. Purposeful hourly rounding was completed using AIDET and 5Ps. Outcomes of PHR documented as they occurred. Bed alarm in use as appropriate. Dual Suction and ambubag in place.

## 2021-12-29 NOTE — PROGRESS NOTES
3:17 PM  CM communicated with MD, patient will likely dc tomorrow morning and will need O2. RT will see patient first thing tomorrow to complete the home oximetry testing. CM spoke to patient and he is agreeable to CM send a preliminary referral to [facility] to start the process and be prepared for tomorrow. ASHLY Antony      12/29/21  10:51 AM    Care Management Transition of Care    RUR: 7%  Level: Low/Green  Readmission: No  Bundle: No    1. Awaiting medical clearance and discharge order. Cardiology and pulmonology following. Pt requiring 4L O2. Cardiac MRI scheduled for today  2. No skilled PT and OT needs  3. Outpatient follow-up. 4. Transport need TBD.    5. CM following for dc needs- on O2 following for home O2     ASHLY Antony  Care Managment

## 2021-12-29 NOTE — H&P
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 He notes worsening SOB over the past several months. He denies any significant change in mild LE edema. Denies change in weight. He has never smoked. Denies h/o lung disease. Denies cough or wheezing. Denies fevers. Does note intermittent chills. Pt states that he does have some snoring, but denies apnea. He does awaken refreshed, but does endorse some daytime somnolence. Interval history:  12/29- He is feeling well, denies any complaints. Seen sitting up on edge of bed. Remains on 4LNC o2. He says he was not put on NIV last night (transferred to 3rd floor tele yesterday). Past Medical History:   Diagnosis Date    Heart failure ([facility] [address]     Hypertension       Past Surgical History:   Procedure Laterality Date    HX ORTHOPAEDIC        Prior to Admission medications    Medication Sig Start Date End Date Taking? Authorizing Provider   carvediloL (COREG) 25 mg tablet Take 25 mg by mouth two (2) times daily (with meals). Yes Provider, Historical   furosemide (Lasix) 20 mg tablet Take 20 mg by mouth daily as needed. Yes Provider, Historical   lisinopriL (PRINIVIL, ZESTRIL) 5 mg tablet Take 5 mg by mouth daily. Yes Provider, Historical   ascorbic acid, vitamin C, (Vitamin C) 500 mg tablet Take 500 mg by mouth daily. Yes Provider, Historical   therapeutic multivitamin (THERAGRAN) tablet Take 1 Tablet by mouth daily. Yes Provider, Historical   cyanocobalamin (Vitamin B-12) 100 mcg tablet Take 100 mcg by mouth daily.    Yes Provider, Historical     No Known Allergies   Social History     Tobacco Use    Smoking status: Never Smoker    Smokeless tobacco: Never Used   Substance Use Topics    Alcohol use: Yes     Comment: socially       Family History   Problem Relation Age of Onset    Heart Disease Brother         Review of Systems   Constitutional: Negative for activity change, appetite change, chills, fever and unexpected weight change. HENT: Negative for congestion. Respiratory: Negative for apnea, cough, chest tightness, shortness of breath and wheezing. Cardiovascular: Positive for leg swelling. Negative for chest pain. Gastrointestinal: Negative for abdominal distention. Endocrine: Negative for cold intolerance. Genitourinary: Negative for difficulty urinating. Musculoskeletal: Negative for back pain. Allergic/Immunologic: Negative for environmental allergies. Neurological: Negative for dizziness. Hematological: Negative for adenopathy. Psychiatric/Behavioral: Negative for agitation. Objective:     Blood pressure (!) 139/97, pulse (!) 103, temperature 98.2 °F (36.8 °C), resp. rate 16, height 5' 7\" (1.702 m), weight 79.5 kg (175 lb 4.8 oz), SpO2 93 %. Temp (24hrs), Av.2 °F (36.8 °C), Min:97.5 °F (36.4 °C), Max:98.5 °F (36.9 °C)        No intake/output data recorded. 1901 -  0700  In: 80 [P.O.:580]  Out: 400 [Urine:400]    Physical Exam  Constitutional:       Appearance: Normal appearance. He is normal weight. HENT:      Head: Normocephalic and atraumatic. Right Ear: External ear normal.      Left Ear: External ear normal.      Nose: Nose normal.      Mouth/Throat:      Mouth: Mucous membranes are dry. Eyes:      Extraocular Movements: Extraocular movements intact. Cardiovascular:      Rate and Rhythm: Normal rate and regular rhythm. Heart sounds: Normal heart sounds. Pulmonary:      Effort: Pulmonary effort is normal.      Breath sounds: Normal breath sounds. Musculoskeletal:      Cervical back: No tenderness. Skin:     General: Skin is warm and dry. Neurological:      Mental Status: He is alert.       Comments: Awake and alert   Psychiatric:         Mood and Affect: Mood normal.         Behavior: Behavior normal.       Data Review:   Recent Results (from the past 24 hour(s))   METABOLIC PANEL, BASIC    Collection Time: 12/28/21  3:50 PM   Result Value Ref Range    Sodium 139 136 - 145 mmol/L    Potassium 5.0 3.5 - 5.1 mmol/L    Chloride 95 (L) 97 - 108 mmol/L    CO2 40 (H) 21 - 32 mmol/L    Anion gap 4 (L) 5 - 15 mmol/L    Glucose 111 (H) 65 - 100 mg/dL    BUN 14 6 - 20 MG/DL    Creatinine 0.81 0.70 - 1.30 MG/DL    BUN/Creatinine ratio 17 12 - 20      GFR est AA >60 >60 ml/min/1.73m2    GFR est non-AA >60 >60 ml/min/1.73m2    Calcium 8.0 (L) 8.5 - 10.1 MG/DL   CBC WITH AUTOMATED DIFF    Collection Time: 12/29/21  5:13 AM   Result Value Ref Range    WBC 6.3 4.1 - 11.1 K/uL    RBC 4.94 4.10 - 5.70 M/uL    HGB 15.7 12.1 - 17.0 g/dL    HCT 51.3 (H) 36.6 - 50.3 %    .8 (H) 80.0 - 99.0 FL    MCH 31.8 26.0 - 34.0 PG    MCHC 30.6 30.0 - 36.5 g/dL    RDW 13.6 11.5 - 14.5 %    PLATELET 253 045 - 079 K/uL    MPV 9.9 8.9 - 12.9 FL    NRBC 0.0 0  WBC    ABSOLUTE NRBC 0.00 0.00 - 0.01 K/uL    NEUTROPHILS 74 32 - 75 %    LYMPHOCYTES 14 12 - 49 %    MONOCYTES 10 5 - 13 %    EOSINOPHILS 1 0 - 7 %    BASOPHILS 1 0 - 1 %    IMMATURE GRANULOCYTES 0 0.0 - 0.5 %    ABS. NEUTROPHILS 4.7 1.8 - 8.0 K/UL    ABS. LYMPHOCYTES 0.9 0.8 - 3.5 K/UL    ABS. MONOCYTES 0.6 0.0 - 1.0 K/UL    ABS. EOSINOPHILS 0.1 0.0 - 0.4 K/UL    ABS. BASOPHILS 0.0 0.0 - 0.1 K/UL    ABS. IMM.  GRANS. 0.0 0.00 - 0.04 K/UL    DF AUTOMATED     METABOLIC PANEL, COMPREHENSIVE    Collection Time: 12/29/21  5:13 AM   Result Value Ref Range    Sodium 138 136 - 145 mmol/L    Potassium 4.4 3.5 - 5.1 mmol/L    Chloride 95 (L) 97 - 108 mmol/L    CO2 42 (HH) 21 - 32 mmol/L    Anion gap 1 (L) 5 - 15 mmol/L    Glucose 186 (H) 65 - 100 mg/dL    BUN 15 6 - 20 MG/DL    Creatinine 0.85 0.70 - 1.30 MG/DL    BUN/Creatinine ratio 18 12 - 20      GFR est AA >60 >60 ml/min/1.73m2    GFR est non-AA >60 >60 ml/min/1.73m2    Calcium 8.0 (L) 8.5 - 10.1 MG/DL    Bilirubin, total 0.6 0.2 - 1.0 MG/DL    ALT (SGPT) 40 12 - 78 U/L    AST (SGOT) 29 15 - 37 U/L    Alk. phosphatase 72 45 - 117 U/L    Protein, total 6.1 (L) 6.4 - 8.2 g/dL    Albumin 2.9 (L) 3.5 - 5.0 g/dL    Globulin 3.2 2.0 - 4.0 g/dL    A-G Ratio 0.9 (L) 1.1 - 2.2     MAGNESIUM    Collection Time: 12/29/21  5:13 AM   Result Value Ref Range    Magnesium 2.5 (H) 1.6 - 2.4 mg/dL   PHOSPHORUS    Collection Time: 12/29/21  5:13 AM   Result Value Ref Range    Phosphorus 4.6 2.6 - 4.7 MG/DL     CTA chest: FINDINGS:  THYROID: No nodule. MEDIASTINUM: No mass or lymphadenopathy. RACHEL: No mass or lymphadenopathy. THORACIC AORTA: No dissection or aneurysm. PULMONARY ARTERIES: Normal in caliber. The pulmonary arteries are well enhanced. No evidence of pulmonary embolus. TRACHEA/BRONCHI: Patent. ESOPHAGUS: No wall thickening or dilatation. HEART: Normal in size. PLEURA: Trace right pleural effusion. No left pleural effusion. LUNGS: There is mild bibasilar atelectasis. No nodule, mass, or airspace  disease. INCIDENTALLY IMAGED UPPER ABDOMEN: No focal abnormality. BONES: There is moderate dextroconvex scoliosis of the thoracic spine. A  Greco tania is in place.     IMPRESSION  1. Negative for pulmonary embolus. 2. Trace right pleural effusion with mild bibasilar atelectasis. .      Assessment:   Pulmonary HTN s/p RHC on 12/27  ? Acute, but suspected chronic respiratory failure with hypoxia and hypercapnia-- currently on 4LNC o2  Klippel-Feil syndrome s/p extensive back surgery, suspect underlying spinal deformities contributing to resp failure. Mild Volume OL  H/o CHF  ? PFO on TTE, right to left shunt   Severe RV dysfunction noted on echo per Dr. Brenda Up' review, suspected due to chronic hypoxia  Suspected GABY      Plan:   supplemental for goal sats >90%. He will need home o2 set up prior to discharge-- discussed with CM. NIV at night while inpatient-- recommend restarting qhs, discussed with nurse.  Recommend outpatient sleep study for suspected GABY. Encourage OOB/IS. Recommend outpt PFTs. Will arrange follow up in our pulmonary and sleep clinics. Undergoing evaluation for possible ASD closure by cardiology. Plan for cardiac MRI today. ADAN was aborted due to resp arrest with sedation. Coreg, prn diuresis as per cardiology. Pulmonary will sign off, call for questions or concerns.

## 2021-12-29 NOTE — PROGRESS NOTES
CARDIOLOGY PROGRESS NOTE        [address] [facility] [address], [place], [address] [facility]  Phone [phone]; Fax [phone]          2021 9:11 AM       Admit Date:           2021  Admit Diagnosis:  Acute respiratory failure with hypoxia ([facility] [address] [J96.01]  :          1965   MRN:          [id number]        Assessment/Plan  1. Hypoxia:  suspect hypoxia has progressed due to increased shunting related to pulmonary hypertension> Will need home oxygen assessment CM to arrange home O2 prior to discharge. On nasal canula 4 liters now, Bi pap at sleep. 2.  Hypercapnia  3. Pulm htn: cont coreg, stopped isordil for low BP. Unable to tolerate diuresis d/t hypotension   4.  RV dysfunction: mod to severe on echo. ADAN aborted d/t brief resp arrest, resolved after given flumazenil, narcan,? ASD - for cardiac MRI today. May benefit from probable ASD closure however his severe pulmonary hypertension will complicate decision due to long term needs supplemental oxygen. 5.  R--> L shunt  6. Hyperkalemia: stopped ACE, K 4.4           We discussed the expected course, resolution and complications of the diagnosis(es) in detail. No intake/output data recorded. Last 3 Recorded Weights in this Encounter    21 0636 21 1520 21 0415   Weight: 78 kg (171 lb 15.3 oz) 79.7 kg (175 lb 11.2 oz) 79.5 kg (175 lb 4.8 oz)          1901 -  0700  In: 80 [P.O.:580]  Out: 400 [Urine:400]    SUBJECTIVE             Mika Lundberg reports feeling better.    Does not have good insight into his disease process       Current Facility-Administered Medications   Medication Dose Route Frequency    carvediloL (COREG) tablet 12.5 mg  12.5 mg Oral BID WITH MEALS    LORazepam (ATIVAN) injection 0.5 mg  0.5 mg IntraVENous BID PRN    sodium chloride (NS) flush 5-40 mL  5-40 mL IntraVENous Q8H    sodium chloride (NS) flush 5-40 mL  5-40 mL IntraVENous PRN    acetaminophen (TYLENOL) tablet 650 mg  650 mg Oral Q6H PRN    Or    acetaminophen (TYLENOL) suppository 650 mg  650 mg Rectal Q6H PRN    polyethylene glycol (MIRALAX) packet 17 g  17 g Oral DAILY PRN    ondansetron (ZOFRAN ODT) tablet 4 mg  4 mg Oral Q8H PRN    Or    ondansetron (ZOFRAN) injection 4 mg  4 mg IntraVENous Q6H PRN    enoxaparin (LOVENOX) injection 40 mg  40 mg SubCUTAneous DAILY      OBJECTIVE               Intake/Output Summary (Last 24 hours) at 2021 4995  Last data filed at 2021 2925  Gross per 24 hour   Intake 100 ml   Output --   Net 100 ml       Review of Systems - History obtained from the patient AS PER  HPI        PHYSICAL EXAM        Visit Vitals  /82 (BP 1 Location: Right upper arm, BP Patient Position: At rest)   Pulse 96   Temp 98.2 °F (36.8 °C)   Resp 17   Ht 5' 7\" (1.702 m)   Wt 79.5 kg (175 lb 4.8 oz)   SpO2 90%   BMI 27.46 kg/m²       Gen: Well-developed, well-nourished, in no acute distress  alert and oriented x 3  HEENT:  Pink conjunctivae, Hearing grossly normal.  No scleral icterus or conjunctival, moist mucous membranes  Neck: Supple,  No JVD  Resp: No accessory muscle use, diminished bases   Card: Regular Rate, Rythm,  Normal S1, S2, No murmurs, rubs or gallop. MSK: No cyanosis or clubbing, good capillary refill  Skin: No rashes or ulcers, no bruising  Neuro:  Moving all four extremities, no focal deficit, follows commands appropriately  Psych:  Poor insight, oriented to person, place and time, alert, Nml Affect  LE: + 1 LE edema w/ erythema to ankles       DATA REVIEW      Records received from ANJANA Mandel  Echo 2016 ef 40-45%  Ef improved w GDMT  Echo 2017 EF 55%  Normal nuclear stress test  2017     Brother  from CHF in his 45s   Father AA s/p repair at age 75 yo    Cardiac monitor: SR         Laboratory and Imaging have been reviewed by me and are notable for  No results for input(s): CPK, CKMB, TROIQ in the last 72 hours.   Recent Labs     21  2246 12/28/21  1550 12/28/21  0256 12/26/21  1511 12/26/21  1511    139 138   < > 140   K 4.4 5.0 5.2*   < > 5.0   CO2 42* 40* 39*   < > 43*   BUN 15 14 16   < > 18   CREA 0.85 0.81 0.87   < > 0.93   * 111* 91   < > 174*   PHOS 4.6  --   --   --   --    MG 2.5*  --   --   --   --    WBC 6.3  --  6.3  --  7.9   HGB 15.7  --  17.0  --  16.0   HCT 51.3*  --  53.9*  --  50.4*     --  219  --  234    < > = values in this interval not displayed.              Efrain Gonzalez, NP

## 2021-12-29 NOTE — PROGRESS NOTES
[facility]  [address] [facility], [address] [place] [address]  [phone]      Medical Progress Note      NAME: Jamey Myers   :  1965  MRM:  [id number]    Date/Time of service: 2021  2:55 PM       Subjective:     Chief Complaint:  Patient was personally seen and examined by me during this time period. Chart reviewed. Still with some dyspnea. Cardiac MRI done       Objective:       Vitals:       Last 24hrs VS reviewed since prior progress note.  Most recent are:    Visit Vitals  BP (!) 139/97 (BP 1 Location: Right upper arm, BP Patient Position: Sitting)   Pulse (!) 103   Temp 98.2 °F (36.8 °C)   Resp 16   Ht 5' 7\" (1.702 m)   Wt 79.5 kg (175 lb 4.8 oz)   SpO2 93%   BMI 27.46 kg/m²     SpO2 Readings from Last 6 Encounters:   21 93%   21 95%   21 93%    O2 Flow Rate (L/min): 4 l/min       Intake/Output Summary (Last 24 hours) at 2021 1455  Last data filed at 2021 2243  Gross per 24 hour   Intake 100 ml   Output --   Net 100 ml        Exam:     Physical Exam:    Gen:  Well-developed, well-nourished, obese, in no acute distress  HEENT:  Pink conjunctivae, PERRL, hearing intact to voice, moist mucous membranes  Neck:  Supple, without masses, thyroid non-tender  Resp:  No accessory muscle use, clear breath sounds without wheezes rales or rhonchi  Card:  2/6 murmurs, normal S1, S2 without thrills, +edema  Abd:  Soft, non-tender, non-distended, normoactive bowel sounds are present  Musc:  No cyanosis or clubbing  Skin:  No rashes  Neuro:  Cranial nerves 3-12 are grossly intact, follows commands appropriately  Psych:  Good insight, oriented to person, place and time, alert    Medications Reviewed: (see below)    Lab Data Reviewed: (see below)    ______________________________________________________________________    Medications:     Current Facility-Administered Medications   Medication Dose Route Frequency    hydrALAZINE (APRESOLINE) tablet 25 mg 25 mg Oral BID    carvediloL (COREG) tablet 12.5 mg  12.5 mg Oral BID WITH MEALS    LORazepam (ATIVAN) injection 0.5 mg  0.5 mg IntraVENous BID PRN    sodium chloride (NS) flush 5-40 mL  5-40 mL IntraVENous Q8H    sodium chloride (NS) flush 5-40 mL  5-40 mL IntraVENous PRN    acetaminophen (TYLENOL) tablet 650 mg  650 mg Oral Q6H PRN    Or    acetaminophen (TYLENOL) suppository 650 mg  650 mg Rectal Q6H PRN    polyethylene glycol (MIRALAX) packet 17 g  17 g Oral DAILY PRN    ondansetron (ZOFRAN ODT) tablet 4 mg  4 mg Oral Q8H PRN    Or    ondansetron (ZOFRAN) injection 4 mg  4 mg IntraVENous Q6H PRN    enoxaparin (LOVENOX) injection 40 mg  40 mg SubCUTAneous DAILY          Lab Review:     Recent Labs     12/29/21  0513 12/28/21  0256 12/26/21  1511   WBC 6.3 6.3 7.9   HGB 15.7 17.0 16.0   HCT 51.3* 53.9* 50.4*    219 234     Recent Labs     12/29/21  0513 12/28/21  1550 12/28/21  0256    139 138   K 4.4 5.0 5.2*   CL 95* 95* 93*   CO2 42* 40* 39*   * 111* 91   BUN 15 14 16   CREA 0.85 0.81 0.87   CA 8.0* 8.0* 8.5   MG 2.5*  --   --    PHOS 4.6  --   --    ALB 2.9*  --  3.1*   TBILI 0.6  --  1.1*   ALT 40  --  42     No results found for: GLUCPOC       Assessment / Plan: Active Problems:    48 yo hx of HTN, combined syst/diast CHF, severe pulm HTN, presented w/ resp failure, hypoxia    1) Acute respiratory failure with hypoxia: likely due to severe pulm HTN, cor pulmonale, underlying spinal deformities (Klippel-Feil syndrome). Pulm and Cards were following. Will need home O2 on discharge. Will need outpatient PFTs, sleep studies    2) Acute on chronic R sided heart failure/cor pulmonale/severe pulm HTN: no cardiac shunts on MRI. S/p RHC on 12/27. Has poor RV function. Cont coreg, hydralazine, O2. Cards will eval for vasodilator therapy as an outpatient     3) HTN: cont BB, ACEi    4) PFO: small, seen on cardiac MRI.   No further management     Total time spent with patient: 28 min **I personally saw and examined the patient during this time period**                 Care Plan discussed with: Patient, nursing, Cards    Discussed:  Care Plan    Prophylaxis:  Lovenox    Disposition:   PT, OT, RN           ___________________________________________________    Attending Physician: Lul Blas MD

## 2021-12-30 VITALS
HEART RATE: 101 BPM | HEIGHT: 67 IN | WEIGHT: 176.1 LBS | TEMPERATURE: 98.4 F | OXYGEN SATURATION: 96 % | DIASTOLIC BLOOD PRESSURE: 76 MMHG | RESPIRATION RATE: 15 BRPM | BODY MASS INDEX: 27.64 KG/M2 | SYSTOLIC BLOOD PRESSURE: 111 MMHG

## 2021-12-30 PROBLEM — I27.20 SEVERE PULMONARY HYPERTENSION (HCC): Status: ACTIVE | Noted: 2021-12-30

## 2021-12-30 LAB
ANION GAP SERPL CALC-SCNC: ABNORMAL MMOL/L (ref 5–15)
BUN SERPL-MCNC: 17 MG/DL (ref 6–20)
BUN/CREAT SERPL: 21 (ref 12–20)
CALCIUM SERPL-MCNC: 8.1 MG/DL (ref 8.5–10.1)
CHLORIDE SERPL-SCNC: 97 MMOL/L (ref 97–108)
CO2 SERPL-SCNC: 41 MMOL/L (ref 21–32)
CREAT SERPL-MCNC: 0.8 MG/DL (ref 0.7–1.3)
GLUCOSE SERPL-MCNC: 187 MG/DL (ref 65–100)
MAGNESIUM SERPL-MCNC: 2.3 MG/DL (ref 1.6–2.4)
PHOSPHATE SERPL-MCNC: 2.7 MG/DL (ref 2.6–4.7)
POTASSIUM SERPL-SCNC: 4.5 MMOL/L (ref 3.5–5.1)
SODIUM SERPL-SCNC: 137 MMOL/L (ref 136–145)

## 2021-12-30 PROCEDURE — 99232 SBSQ HOSP IP/OBS MODERATE 35: CPT | Performed by: STUDENT IN AN ORGANIZED HEALTH CARE EDUCATION/TRAINING PROGRAM

## 2021-12-30 PROCEDURE — 80048 BASIC METABOLIC PNL TOTAL CA: CPT

## 2021-12-30 PROCEDURE — 74011250637 HC RX REV CODE- 250/637: Performed by: NURSE PRACTITIONER

## 2021-12-30 PROCEDURE — 74011250636 HC RX REV CODE- 250/636: Performed by: INTERNAL MEDICINE

## 2021-12-30 PROCEDURE — APPSS30 APP SPLIT SHARED TIME 16-30 MINUTES: Performed by: NURSE PRACTITIONER

## 2021-12-30 PROCEDURE — 97116 GAIT TRAINING THERAPY: CPT

## 2021-12-30 PROCEDURE — 36415 COLL VENOUS BLD VENIPUNCTURE: CPT

## 2021-12-30 PROCEDURE — 97530 THERAPEUTIC ACTIVITIES: CPT

## 2021-12-30 PROCEDURE — 84100 ASSAY OF PHOSPHORUS: CPT

## 2021-12-30 PROCEDURE — 83735 ASSAY OF MAGNESIUM: CPT

## 2021-12-30 PROCEDURE — 74011250637 HC RX REV CODE- 250/637: Performed by: STUDENT IN AN ORGANIZED HEALTH CARE EDUCATION/TRAINING PROGRAM

## 2021-12-30 RX ORDER — HYDRALAZINE HYDROCHLORIDE 25 MG/1
25 TABLET, FILM COATED ORAL 2 TIMES DAILY
Qty: 60 TABLET | Refills: 0 | Status: SHIPPED | OUTPATIENT
Start: 2021-12-30

## 2021-12-30 RX ORDER — CARVEDILOL 12.5 MG/1
12.5 TABLET ORAL 2 TIMES DAILY WITH MEALS
Qty: 60 TABLET | Refills: 0 | Status: SHIPPED | OUTPATIENT
Start: 2021-12-30 | End: 2022-03-07 | Stop reason: SDUPTHER

## 2021-12-30 RX ADMIN — Medication 10 ML: at 05:49

## 2021-12-30 RX ADMIN — ENOXAPARIN SODIUM 40 MG: 100 INJECTION SUBCUTANEOUS at 08:50

## 2021-12-30 RX ADMIN — HYDRALAZINE HYDROCHLORIDE 25 MG: 25 TABLET, FILM COATED ORAL at 08:54

## 2021-12-30 RX ADMIN — CARVEDILOL 12.5 MG: 12.5 TABLET, FILM COATED ORAL at 08:54

## 2021-12-30 NOTE — PROGRESS NOTES
Documentation for home O2:     ROOM AIR    AT REST   O2 SATS  88 HR  107   ROOM AIR WITH ACTIVITY 02 SATS  83 HR  123   (3    ) LITERS OF O2 WITH ACTIVITY O2 SATS  92 HR  116   (3    )LITERS OF 02 PATIENT LEFT COMFORTABLY  SITTING/SUPINE 02 SATS  96 HR  106     PT performed assessment for home O2. Patient will need 3 liters of O2 for home use. Full note to follow. Notified nursing and CM.

## 2021-12-30 NOTE — PROGRESS NOTES
CARDIOLOGY PROGRESS NOTE        [address] [facility], [place], [address] [facility]  Phone [phone]; Fax [phone]          2021 9:11 AM       Admit Date:           2021  Admit Diagnosis:  Acute respiratory failure with hypoxia ([facility] [address] [J96.01]  :          1965   MRN:          [id number]        Assessment/Plan  1. Hypoxia:  suspect hypoxia has progressed due to increased shunting related to pulmonary hypertension> Will need home oxygen assessment CM to arrange home O2 prior to discharge. On nasal canula 4 liters now, Bi pap at sleep. 2.  Hypercapnia: chronic   3. Pulm htn: cont coreg, hydralazine, stopped isordil for low BP. Unable to tolerate diuresis d/t hypotension -  will repeat RHC in few weeks after pt on O2 therapy, challenge w/ nitric oxide at that time   4.  RV dysfunction: mod to severe on echo. ADAN aborted d/t brief resp arrest, resolved after given flumazenil, narcan, no ASD/VSD seen on MRI, small PFO - not a candidate at this time for closure d/t severe pulm HTN   5.  R--> L shunt  6. Hyperkalemia: stopped ACE, K 4.5           We discussed the expected course, resolution and complications of the diagnosis(es) in detail. No intake/output data recorded. Last 3 Recorded Weights in this Encounter    21 1520 21 0415 21 0644   Weight: 79.7 kg (175 lb 11.2 oz) 79.5 kg (175 lb 4.8 oz) 79.9 kg (176 lb 1.6 oz)          1901 -  0700  In: 100 [P.O.:100]  Out: 0     SUBJECTIVE             Raynold Ponto reports feeling better.    Does not have good insight into his disease process       Current Facility-Administered Medications   Medication Dose Route Frequency    hydrALAZINE (APRESOLINE) tablet 25 mg  25 mg Oral BID    carvediloL (COREG) tablet 12.5 mg  12.5 mg Oral BID WITH MEALS    LORazepam (ATIVAN) injection 0.5 mg  0.5 mg IntraVENous BID PRN    sodium chloride (NS) flush 5-40 mL  5-40 mL IntraVENous Q8H    sodium chloride (NS) flush 5-40 mL  5-40 mL IntraVENous PRN    acetaminophen (TYLENOL) tablet 650 mg  650 mg Oral Q6H PRN    Or    acetaminophen (TYLENOL) suppository 650 mg  650 mg Rectal Q6H PRN    polyethylene glycol (MIRALAX) packet 17 g  17 g Oral DAILY PRN    ondansetron (ZOFRAN ODT) tablet 4 mg  4 mg Oral Q8H PRN    Or    ondansetron (ZOFRAN) injection 4 mg  4 mg IntraVENous Q6H PRN    enoxaparin (LOVENOX) injection 40 mg  40 mg SubCUTAneous DAILY      OBJECTIVE               Intake/Output Summary (Last 24 hours) at 2021 0824  Last data filed at 2021 1525  Gross per 24 hour   Intake --   Output 0 ml   Net 0 ml       Review of Systems - History obtained from the patient AS PER  HPI        PHYSICAL EXAM        Visit Vitals  BP (!) 127/93 (BP 1 Location: Left upper arm, BP Patient Position: At rest;Sitting)   Pulse (!) 103   Temp 98.1 °F (36.7 °C)   Resp 16   Ht 5' 7\" (1.702 m)   Wt 79.9 kg (176 lb 1.6 oz)   SpO2 97%   BMI 27.58 kg/m²       Gen: Well-developed, well-nourished, in no acute distress  alert and oriented x 3  HEENT:  Pink conjunctivae, Hearing grossly normal.  No scleral icterus or conjunctival, moist mucous membranes  Neck: Supple,  No JVD  Resp: No accessory muscle use, diminished bases   Card: Regular Rate, Rythm,  Normal S1, S2, No murmurs, rubs or gallop.    MSK: No cyanosis or clubbing, good capillary refill  Skin: No rashes or ulcers, no bruising  Neuro:  Moving all four extremities, no focal deficit, follows commands appropriately  Psych:  Poor insight, oriented to person, place and time, alert, Nml Affect  LE: + 1 LE edema w/ erythema to ankles       DATA REVIEW      Records received from ANJANA Phelps  Echo 2016 ef 40-45%  Ef improved w GDMT  Echo 2017 EF 55%  Normal nuclear stress test  2017     Brother  from CHF in his 45s   Father AA s/p repair at age 75 yo    Cardiac monitor: SR         Laboratory and Imaging have been reviewed by me and are notable for  No results for input(s): CPK, CKMB, TROIQ in the last 72 hours. Recent Labs     12/30/21  0236 12/29/21  0513 12/28/21  1550 12/28/21  0256 12/28/21  0256    138 139   < > 138   K 4.5 4.4 5.0   < > 5.2*   CO2 41* 42* 40*   < > 39*   BUN 17 15 14   < > 16   CREA 0.80 0.85 0.81   < > 0.87   * 186* 111*   < > 91   PHOS 2.7 4.6  --   --   --    MG 2.3 2.5*  --   --   --    WBC  --  6.3  --   --  6.3   HGB  --  15.7  --   --  17.0   HCT  --  51.3*  --   --  53.9*   PLT  --  194  --   --  219    < > = values in this interval not displayed.              Alma Rosa Patricia, NP

## 2021-12-30 NOTE — NURSE NAVIGATOR
Met with patient prior to discharge. Introduced self and role of HF NN. Assessed current understanding of HF and hospitalization. Patient had a good understanding of his hospitalization and treatment plan. He has been evaluated for home oxygen. Patient knows the names of his medications and states he manages his own medications. Reviewed medication changes and attached new medications to AVS.  Patient states he has been taking lasix 20 mg most days. He does not weigh daily and follows his degree of ankle swelling to determine whether to take his lasix. He was not open to watching daily weight and was adamant that he does not have any weight fluctuation to monitor for. He plans to continue monitoring and taking lasix as he was previous to admission. Advised patient to begin monitoring BP at home and take readings to follow up with Dr Sheryl Edgar. Reviewed PA HTN disease process and importance of follow up with Pulmonary for PFT's and sleep study. Patient was agreeable to this. Reviewed scheduled follow up appointments with PCP and with Dr Sheryl Edgar. Patient is able to attend. Patient declined HF calendar. He was given HF magnet and handout on sodium.

## 2021-12-30 NOTE — PROGRESS NOTES
0700: Bedside and Verbal shift change report given to Fern Gonzalez (oncoming nurse) by Beryl Rendon (offgoing nurse). Report included the following information SBAR, Kardex, Accordion and Cardiac Rhythm NSR.    1100: Discharge orders placed. Waiting on patient's transportation. ETA: 1530  1540: I have reviewed discharge instructions with the patient. The patient verbalized understanding. Patient is discharged      This patient was assisted with Intentional Toileting every 2 hours during this shift as appropriate. Documentation of ambulation and output reflected on Flowsheet as appropriate. Purposeful hourly rounding was completed using AIDET and 5Ps. Outcomes of PHR documented as they occurred. Bed alarm in use as appropriate. Dual Suction and ambubag in place.

## 2021-12-30 NOTE — PROGRESS NOTES
1356:  AVS faxed to [phone]:  Pt requested a portable O2 conserving device. Added to order and sent in [facility]. CM Note:  Pt had no preference for a DME company for home O2. [employer] has O2. A referral was sent in [facility] and they will deliver portable tank to pt. Pt to be transported home today at d/c by a family member. No CM needs. He is to f/u OP with providers as scheduled.   ZACKARY Bruno

## 2021-12-30 NOTE — DISCHARGE SUMMARY
[facility]  [address] [facility] [address] [place], [address] [facility]  [phone]    Physician Discharge Summary     Patient ID:  Nicolas Waters  [id number]  49 y.o.  1965    Admit date: 12/22/2021    Discharge date and time: 12/30/2021 10:51 AM    Admission Diagnoses: Acute respiratory failure with hypoxia ([address] [J96.01]    Discharge Diagnoses:  Principal Diagnosis Acute respiratory failure with hypoxia ([address]                                            Principal Problem:    Acute respiratory failure with hypoxia ([address] (12/22/2021)    Active Problems:    Acute on chronic heart failure ([address] (12/22/2021)      Severe pulmonary hypertension ([address] (12/30/2021)           Hospital Course:     50 yo hx of HTN, combined syst/diast CHF, severe pulm HTN, presented w/ resp failure, hypoxia     1) Acute respiratory failure with hypoxia: likely due to severe pulm HTN, cor pulmonale, underlying spinal deformities (Klippel-Feil syndrome). Pulm and Cards were following. Will need 3L home O2 on discharge. Will need outpatient PFTs, sleep studies     2) Acute on chronic R sided heart failure/cor pulmonale/severe pulm HTN: no cardiac shunts on MRI. S/p RHC on 12/27. Has poor RV function. Cont coreg, hydralazine, O2. Cards will eval for vasodilator therapy as an outpatient      3) HTN: cont BB, hydralazine      4) PFO: small, seen on cardiac MRI.   No further management     PCP: Natanael Parrish MD     Consults: Cards, Pulm    Significant Diagnostic Studies: heart Cath    Discharge Exam:  Physical Exam:    Gen:  Well-developed, well-nourished, obese, in no acute distress  HEENT:  Pink conjunctivae, PERRL, hearing intact to voice, moist mucous membranes  Neck:  Supple, without masses, thyroid non-tender  Resp:  No accessory muscle use, clear breath sounds without wheezes rales or rhonchi  Card:  2/6 murmurs, normal S1, S2 without thrills, +edema  Abd:  Soft, non-tender, non-distended, normoactive bowel sounds are present  Musc:  No cyanosis or clubbing  Skin:  No rashes  Neuro:  Cranial nerves 3-12 are grossly intact, follows commands appropriately  Psych:  Good insight, oriented to person, place and time, alert    Disposition: home  Discharge Condition: Stable    Patient Instructions:   Current Discharge Medication List      START taking these medications    Details   hydrALAZINE (APRESOLINE) 25 mg tablet Take 1 Tablet by mouth two (2) times a day. Indications: chronic heart failure  Qty: 60 Tablet, Refills: 0         CONTINUE these medications which have CHANGED    Details   carvediloL (COREG) 12.5 mg tablet Take 1 Tablet by mouth two (2) times daily (with meals). Qty: 60 Tablet, Refills: 0         CONTINUE these medications which have NOT CHANGED    Details   furosemide (Lasix) 20 mg tablet Take 20 mg by mouth daily as needed. ascorbic acid, vitamin C, (Vitamin C) 500 mg tablet Take 500 mg by mouth daily. therapeutic multivitamin (THERAGRAN) tablet Take 1 Tablet by mouth daily. cyanocobalamin (Vitamin B-12) 100 mcg tablet Take 100 mcg by mouth daily.          STOP taking these medications       lisinopriL (PRINIVIL, ZESTRIL) 5 mg tablet Comments:   Reason for Stopping:             Activity: Activity as tolerated  Diet: Cardiac Diet  Wound Care: None needed    Follow-up with  Follow-up Information     Follow up With Specialties Details Why Contact Info    Marilyn Brooks MD Cardiology On 1/6/2022 09:40 am [address] [place] [address]      Vaishali Marr MD Internal Medicine Schedule an appointment as soon as possible for a visit in 5 days  [address] [place] [address]  [phone]            Follow-up tests/labs none    Signed:  Patrice Vargas MD  12/30/2021  10:51 AM  **I personally spent 40 min on discharge**

## 2021-12-30 NOTE — ROUTINE PROCESS
Bedside and Verbal shift change report given to Christel Cortés (oncoming nurse) by Edwin (offgoing nurse). Report included the following information SBAR, Kardex, ED Summary, Procedure Summary, Intake/Output, MAR, Recent Results and Cardiac Rhythm NSR.

## 2021-12-30 NOTE — DISCHARGE INSTRUCTIONS
HOSPITALIST DISCHARGE INSTRUCTIONS  NAME: Az Huynh   :  1965   MRN:  [id number]     Date/Time:  2021 10:50 AM    ADMIT DATE: 2021     DISCHARGE DATE: 2021     ADMITTING DIAGNOSIS:  Severe pulmonary hypertension, right sided heart failure, hypoxia    DISCHARGE DIAGNOSIS:  same    MEDICATIONS:  See after visit summary       · It is important that you take the medication exactly as they are prescribed. · Keep your medication in the bottles provided by the pharmacist and keep a list of the medication names, dosages, and times to be taken in your wallet. · Do not take other medications without consulting your doctor     Pain Management: per above medications    What to do at Home    Recommended diet:  Cardiac Diet    Recommended activity: Activity as tolerated    1) Return to the hospital if you feel worse    2) If you experience any of the following symptoms then please call your primary care physician or return to the emergency room if you cannot get hold of your doctor:  Fever, chills, nausea, vomiting, diarrhea, change in mentation, falling, bleeding, shortness of breath, chest pain, severe headache, severe abdominal pain,     3) Use oxygen as directed    4) Follow up with your doctors     Follow Up: Follow-up Information     Follow up With Specialties Details Why Contact Info    Hailey Montero MD Cardiology On 2022 09:40 am [address] [place] [address]      Puneet Garcia MD Internal Medicine Schedule an appointment as soon as possible for a visit in 5 days  Hema [address]istry [address] [id number] [facility]  [phone]              Information obtained by :  I understand that if any problems occur once I am at home I am to contact my physician. I understand and acknowledge receipt of the instructions indicated above. [de-identified] or R.N.'s Signature                                                                  Date/Time                                                                                                                                              Patient or Representative Signature                                                          Date/Time    Patient Education        Pulmonary Hypertension: Care Instructions  Your Care Instructions  Pulmonary hypertension is high blood pressure in the arteries of your lungs. These blood vessels carry blood from the heart to the lungs, where the blood picks up oxygen. The walls of the arteries may get thick, and the arteries may get narrow. When this happens, blood does not flow as well as it should. Pressure builds up in the arteries. Then your heart has to work harder to pump blood through your lungs. There are different types of pulmonary hypertension. They are caused by different things. Causes include other health conditions such as heart or lung problems. Sometimes it can happen without a known cause. When you have this condition, your body gets less oxygen from your blood. This causes symptoms such as shortness of breath and feeling tired, faint, or dizzy. Over time, these symptoms may change or get worse if your heart gets weaker. You may get heart failure. Heart failure means your heart doesn't pump as much blood as your body needs. Treatment can help you feel better and live longer. Your treatment options will depend on the type of pulmonary hypertension you have. It can be hard to learn that you have a problem with your lungs and heart. But there are things you can do to feel better and stay as active as you can. Follow-up care is a key part of your treatment and safety. Be sure to make and go to all appointments, and call your doctor if you are having problems.  It's also a good idea to know your test results and keep a list of the medicines you take.  How can you care for yourself at home? Medicine    · Be safe with medicines. Take your medicines exactly as prescribed. Call your doctor if you think you are having a problem with your medicine. You will get more details on the specific medicines your doctor prescribes.     · Your doctor may prescribe oxygen therapy. You will get more details on how to use it.     · Talk to your doctor before you take any vitamins, over-the-counter medicine, or herbal products. Don't take ibuprofen (Advil or Motrin) and naproxen (Aleve) without talking to your doctor first.     · If you take a blood thinner, be sure to get instructions about how to take your medicine safely. Blood thinners can cause serious bleeding problems. Activity    · Be as active as you can. Talk to your doctor about making a plan before you start a new activity.     · Ask your doctor if a pulmonary rehabilitation program is right for you.     · Learn how to save your energy. Making small changes in daily activities can make a big impact on how you feel. Staying healthy    · Eat healthy foods, and try to stay at a healthy weight.     · Do not smoke. Smoking can make this condition worse. If you need help quitting, talk to your doctor about stop-smoking programs and medicines. These can increase your chances of quitting for good.     · Talk to your doctor about preventing pregnancy. You may need to take steps to avoid becoming pregnant. Pregnancy and childbirth can cause changes in the body that could be life-threatening for women who have this condition.     · Avoid colds and flu. Get a pneumococcal vaccine. If you have had one before, ask your doctor if you need another dose. Get a flu vaccine every year. If you must be around people with colds or flu, wash your hands often.     · Talk with your doctor before traveling. Your doctor may suggest that you use oxygen if you fly or visit a place that is at high altitude.  Follow the instructions on how to use oxygen safely. When should you call for help? Call 911 anytime you think you may need emergency care. For example, call if:    · You have symptoms of sudden heart failure. These may include:  ? Severe trouble breathing. ? A fast or irregular heartbeat. ? Coughing up pink, foamy mucus. ? Passing out. Call your doctor now or seek immediate medical care if:    · You have new or changed symptoms of heart failure, such as:  ? New or increased shortness of breath. ? New or worse swelling in your legs, ankles, or feet. ? Sudden weight gain, such as more than 2 to 3 pounds in a day or 5 pounds in a week. (Your doctor may suggest a different range of weight gain.)  ? Feeling dizzy or lightheaded or like you may faint. ? Feeling so tired or weak that you cannot do your usual activities. ? Not sleeping well. Shortness of breath wakes you at night. You need extra pillows to prop yourself up to breathe easier. Watch closely for changes in your health, and be sure to contact your doctor if:    · You have new or worse symptoms. Where can you learn more? Go to http://www.gray.com/  Enter V107 in the search box to learn more about \"Pulmonary Hypertension: Care Instructions. \"  Current as of: April 29, 2021               Content Version: 13.0  © 3286-4733 [employer]. Care instructions adapted under license by [employer] (which disclaims liability or warranty for this information). If you have questions about a medical condition or this instruction, always ask your healthcare professional. [place] [address] any warranty or liability for your use of this information. Avoiding Triggers With Heart Failure: Care Instructions  Your Care Instructions     Triggers are anything that make your heart failure flare up. A flare-up is also called \"sudden heart failure\" or \"acute heart failure. \" When you have a flare-up, fluid builds up in your lungs, and you have problems breathing. You might need to go to the hospital. By watching for changes in your condition and avoiding triggers, you can prevent heart failure flare-ups. Follow-up care is a key part of your treatment and safety. Be sure to make and go to all appointments, and call your doctor if you are having problems. It's also a good idea to know your test results and keep a list of the medicines you take. How can you care for yourself at home? Watch for changes in your weight and condition  · Weigh yourself without clothing at the same time each day. Record your weight. Call your doctor if you have sudden weight gain, such as more than 2 to 3 pounds in a day or 5 pounds in a week. (Your doctor may suggest a different range of weight gain.) A sudden weight gain may mean that your heart failure is getting worse. · Keep a daily record of your symptoms. Write down any changes in how you feel, such as new shortness of breath, cough, or problems eating. Also record if your ankles are more swollen than usual and if you feel more tired than usual. Note anything that you ate or did that could have triggered these changes. Limit sodium  Sodium causes your body to hold on to extra water. This may cause your heart failure symptoms to get worse. People get most of their sodium from processed foods. Fast food and restaurant meals also tend to be very high in sodium. · Your doctor may suggest that you limit sodium. Your doctor can tell you how much sodium is right for you. This includes limiting sodium in cooked and packaged foods. · Read food labels on cans and food packages. They tell you how much sodium you get in one serving. Check the serving size. If you eat more than one serving, you are getting more sodium. · Be aware that sodium can come in forms other than salt, including monosodium glutamate (MSG), sodium citrate, and sodium bicarbonate (baking soda). MSG is often added to Asian food.  You can sometimes ask for food without MSG or salt. · Slowly reducing salt will help you adjust to the taste. Take the salt shaker off the table. · Flavor your food with garlic, lemon juice, onion, vinegar, herbs, and spices instead of salt. Do not use soy sauce, steak sauce, onion salt, garlic salt, mustard, or ketchup on your food, unless it is labeled \"low-sodium\" or \"low-salt. \"  · Make your own salad dressings, sauces, and ketchup without adding salt. · Use fresh or frozen ingredients, instead of canned ones, whenever you can. Choose low-sodium canned goods. · Eat less processed food and food from restaurants, including fast food. Exercise as directed  Moderate, regular exercise is very good for your heart. It improves your blood flow and helps control your weight. But too much exercise can stress your heart and cause a heart failure flare-up. · Check with your doctor before you start an exercise program.  · Walking is an easy way to get exercise. Start out slowly. Gradually increase the length and pace of your walk. Swimming, riding a bike, and using a treadmill are also good forms of exercise. · When you exercise, watch for signs that your heart is working too hard. You are pushing yourself too hard if you cannot talk while you are exercising. If you become short of breath or dizzy or have chest pain, stop, sit down, and rest.  · Do not exercise when you do not feel well. Take medicines correctly  · Take your medicines exactly as prescribed. Call your doctor if you think you are having a problem with your medicine. · Make a list of all the medicines you take. Include those prescribed to you by other doctors and any over-the-counter medicines, vitamins, or supplements you take. Take this list with you when you go to any doctor. · Take your medicines at the same time every day. It may help you to post a list of all the medicines you take every day and what time of day you take them.   · Make taking your medicine as simple as you can. Plan times to take your medicines when you are doing other things, such as eating a meal or getting ready for bed. This will make it easier to remember to take your medicines. · Get organized. Use helpful tools, such as daily or weekly pill containers. When should you call for help? Call 911  if you have symptoms of sudden heart failure such as:    · You have severe trouble breathing.     · You cough up pink, foamy mucus.     · You have a new irregular or rapid heartbeat. Call your doctor now or seek immediate medical care if:    · You have new or increased shortness of breath.     · You are dizzy or lightheaded, or you feel like you may faint.     · You have sudden weight gain, such as more than 2 to 3 pounds in a day or 5 pounds in a week. (Your doctor may suggest a different range of weight gain.)     · You have increased swelling in your legs, ankles, or feet.     · You are suddenly so tired or weak that you cannot do your usual activities. Watch closely for changes in your health, and be sure to contact your doctor if you develop new symptoms. Where can you learn more? Go to http://www.gray.com/  Enter V089 in the search box to learn more about \"Avoiding Triggers With Heart Failure: Care Instructions. \"  Current as of: April 29, 2021               Content Version: 13.0  © 7941-5990 [employer]. Care instructions adapted under license by [employer] (which disclaims liability or warranty for this information). If you have questions about a medical condition or this instruction, always ask your healthcare professional. [place] [address] any warranty or liability for your use of this information.

## 2021-12-30 NOTE — PROGRESS NOTES
Problem: Mobility Impaired (Adult and Pediatric)  Goal: *Acute Goals and Plan of Care (Insert Text)  Description: FUNCTIONAL STATUS PRIOR TO ADMISSION: Patient was independent and active without use of DME.    HOME SUPPORT PRIOR TO ADMISSION: The patient lived with spouse and teenage children but did not require assist.    Physical Therapy Goals  Initiated 12/23/2021  All goals with intention of spO2 >88% on least restrictive supplemental oxygen or room air. 1.  Patient will move from supine to sit and sit to supine  in bed with independence within 7 day(s). 2.  Patient will transfer from bed to chair and chair to bed with independence using the least restrictive device within 7 day(s). 3.  Patient will perform sit to stand with independence within 7 day(s). 4.  Patient will ambulate with independence for 75 feet with the least restrictive device within 7 day(s). 5. Patient will demonstrate good safety awareness with all ambulation and transfers within 7 day(s)      Outcome: Progressing Towards Goal   PHYSICAL THERAPY TREATMENT  Patient: Candie Colunga (84 y.o. male)  Date: 12/30/2021  Diagnosis: Acute respiratory failure with hypoxia (HCC) [J96.01] Acute respiratory failure with hypoxia (HCC)  Procedure(s) (LRB):  RIGHT HEART CATH (N/A)  CATH LAB US GUIDED VASCULAR ACCESS (N/A) 3 Days Post-Op  Precautions: Fall  Chart, physical therapy assessment, plan of care and goals were reviewed. ASSESSMENT  Patient continues with skilled PT services and is progressing towards goals. Patient demonstrates the need for 3 liters of O2 to maintain acceptable O2 sats. He is impulsive at times and needs cues for pursed lip breathing. Current Level of Function Impacting Discharge (mobility/balance): Received seated in recliner on 2 liters of O2. PT attempted home assessment for home O2 on 2 liters but he failed. Placed him on 3 liters and able to tolerate.     Documentation for home O2:     ROOM AIR    AT REST   O2 SATS  88 HR  107   ROOM AIR WITH ACTIVITY 02 SATS  83 HR  123   (3    ) LITERS OF O2 WITH ACTIVITY O2 SATS  92 HR  116   (3    )LITERS OF 02 PATIENT LEFT COMFORTABLY  SITTING/SUPINE 02 SATS  96 HR  106        Other factors to consider for discharge: needs O 2 at all times         PLAN :  Patient continues to benefit from skilled intervention to address the above impairments. Continue treatment per established plan of care. to address goals. Recommendation for discharge: (in order for the patient to meet his/her long term goals)  No skilled physical therapy/ follow up rehabilitation needs identified at this time. This discharge recommendation:  Has been made in collaboration with the attending provider and/or case management    IF patient discharges home will need the following DME: portable oxygen       SUBJECTIVE:   Patient stated I'm fine.     OBJECTIVE DATA SUMMARY:   Critical Behavior:  Neurologic State: Alert  Orientation Level: Oriented X4  Cognition: Follows commands  Safety/Judgement: Decreased awareness of need for assistance,Lack of insight into deficits,Awareness of environment  Functional Mobility Training:  Bed Mobility:         Received up in chair and left up in chair           Transfers:  Sit to Stand: Stand-by assistance  Stand to Sit: Stand-by assistance                             Balance:  Sitting: Intact  Standing: Intact; With support  Ambulation/Gait Training:  Distance (ft): 100 Feet (ft) (x2 with seated rest in between)  Assistive Device: Gait belt  Ambulation - Level of Assistance: Contact guard assistance        Gait Abnormalities: Decreased step clearance; Toe walking                                                 Therapeutic Exercises:   Pursed lip breathing  Pain Rating:  None reported    Activity Tolerance:   desaturates with exertion and requires oxygen and observed SOB with activity    After treatment patient left in no apparent distress:   Sitting in chair, Call bell within reach, and Bed / chair alarm activated    COMMUNICATION/COLLABORATION:   The patients plan of care was discussed with: Registered nurse and Case management.      Ysabel Sprague, PT   Time Calculation: 30 mins

## 2021-12-30 NOTE — PROGRESS NOTES
Provided pastoral care visit to [facility] 5 patient. Did not include sacramental care.     Lupe Peralta

## 2022-01-04 ENCOUNTER — TELEPHONE (OUTPATIENT)
Dept: CASE MANAGEMENT | Age: 57
End: 2022-01-04

## 2022-01-04 NOTE — TELEPHONE ENCOUNTER
HEART FAILURE NURSE NAVIGATOR POST DISCHARGE FOLLOW UP PHONE CALL     [facility] contacted patient by telephone to perform post hospital discharge follow up call. Verified patient name and date of birth as identifiers. Provided introduction to self and role. Primary phone number listed was his wife's mobile number. She gave me his phone number and patient contacted directly. She felt patient was doing fairly well since discharge but is trying to adjust to wearing oxygen. Patient stated he is \"doing as well as can be expected\" since discharge. Reviewed discharge instructions; confirmed patient is in receipt of all prescribed HF medications. Medication changes reviewed with no discrepancies found. Reinforced importance of daily weights and dietary restrictions, following low sodium diet. Patient has home oxygen and portable tanks for use when leaving home. Reinforced signs/symptoms of HF and when to notify the physician. Confirmed knowledge of scheduled follow up appointment: Confirmed patient aware of 1/6 appointment with Dr Wes Haley. He has PCP appointment the same day. He has not gotten call with Pulmonary follow up. Instructed him to make call if he does not hear from them in the next day or so. Advised patient he could reach back out to me if he has problems obtaining appointment. Confirmed patient has transportation to above appointment. Patient given opportunity to ask questions/express concerns. All questions answered with good understanding.

## 2022-01-06 ENCOUNTER — OFFICE VISIT (OUTPATIENT)
Dept: CARDIOLOGY CLINIC | Age: 57
End: 2022-01-06
Payer: COMMERCIAL

## 2022-01-06 VITALS
HEART RATE: 124 BPM | DIASTOLIC BLOOD PRESSURE: 100 MMHG | BODY MASS INDEX: 27.47 KG/M2 | WEIGHT: 175 LBS | OXYGEN SATURATION: 81 % | SYSTOLIC BLOOD PRESSURE: 140 MMHG | HEIGHT: 67 IN | RESPIRATION RATE: 20 BRPM

## 2022-01-06 DIAGNOSIS — I27.20 SEVERE PULMONARY HYPERTENSION (HCC): Primary | ICD-10-CM

## 2022-01-06 DIAGNOSIS — J96.01 ACUTE RESPIRATORY FAILURE WITH HYPOXIA (HCC): ICD-10-CM

## 2022-01-06 PROCEDURE — 99215 OFFICE O/P EST HI 40 MIN: CPT | Performed by: SPECIALIST

## 2022-01-06 NOTE — PROGRESS NOTES
Jenifer Fowler MD. [facility] [place]              Patient: Jamey Myers  : 1965      Today's Date: 2022          HISTORY OF PRESENT ILLNESS:     History of Present Illness:  Here for follow-up since discharge. He feels better being home. Wearing O2. Still HERRERA with more than usual activities - class 2-3 HERRERA. No orthopnea. PAST MEDICAL HISTORY:     Past Medical History:   Diagnosis Date    [facility] failure ([facility]     Hypertension     Klippel-Feil syndrome     Klippel-Feil syndrome with h/o extensive back surgery in     Pulmonary hypertension ([facility]     Restrictive lung disease     Right ventricular dysfunction     Scoliosis          Past Surgical History:   Procedure Laterality Date    HX ORTHOPAEDIC           MEDICATIONS:     Current Outpatient Medications   Medication Sig Dispense Refill    carvediloL (COREG) 12.5 mg tablet Take 1 Tablet by mouth two (2) times daily (with meals). 60 Tablet 0    hydrALAZINE (APRESOLINE) 25 mg tablet Take 1 Tablet by mouth two (2) times a day. Indications: chronic heart failure 60 Tablet 0    furosemide (Lasix) 20 mg tablet Take 20 mg by mouth daily as needed.  ascorbic acid, vitamin C, (Vitamin C) 500 mg tablet Take 500 mg by mouth daily.  therapeutic multivitamin (THERAGRAN) tablet Take 1 Tablet by mouth daily.  cyanocobalamin (Vitamin B-12) 100 mcg tablet Take 100 mcg by mouth daily. (Patient not taking: Reported on 2022)         No Known Allergies        SOCIAL HISTORY:     Social History     Tobacco Use    Smoking status: Never Smoker    Smokeless tobacco: Never Used   Substance Use Topics    Alcohol use: Yes     Comment: socially     Drug use: Never             REVIEW OF SYMPTOMS:     Review of Symptoms:  Constitutional: Negative for fever, chills  HEENT: Negative for nosebleeds, tinnitus, and vision changes.    Respiratory: Negative for cough, wheezing  + HERRERA  Cardiovascular: Negative for orthopnea, claudication, syncope, and PND. Gastrointestinal: Negative for abdominal pain, diarrhea, melena. Genitourinary: Negative for dysuria  Musculoskeletal: Negative for myalgias. Skin: Negative for rash  Heme: No problems bleeding. Neurological: Negative for speech change and focal weakness. PHYSICAL EXAM:     Physical Exam:  Visit Vitals  BP (!) 140/100   Pulse (!) 124   Resp 20   Ht 5' 7\" (1.702 m)   Wt 175 lb (79.4 kg)   SpO2 (!) 81%   BMI 27.41 kg/m²     Patient appears generally well, mood and affect are appropriate and pleasant. HEENT:  Hearing intact, non-icteric, normocephalic, atraumatic. Neck Exam: Supple  Lung Exam: Clear to auscultation, even breath sounds. Cardiac Exam: Regular rate and rhythm - tachycardic - with no murmur or rub  Abdomen: Soft, non-tender  Extremities: Moves all ext well. No lower extremity edema. MSKTL: Overall good ROM ext  Skin: No significant rashes  Psych: Appropriate affect  Neuro - Grossly intact      LABS / OTHER STUDIES reviewed:       Lab Results   Component Value Date/Time    Sodium 137 12/30/2021 02:36 AM    Potassium 4.5 12/30/2021 02:36 AM    Chloride 97 12/30/2021 02:36 AM    CO2 41 (HH) 12/30/2021 02:36 AM    Anion gap NEG 1 12/30/2021 02:36 AM    Glucose 187 (H) 12/30/2021 02:36 AM    BUN 17 12/30/2021 02:36 AM    Creatinine 0.80 12/30/2021 02:36 AM    BUN/Creatinine ratio 21 (H) 12/30/2021 02:36 AM    GFR est AA >60 12/30/2021 02:36 AM    GFR est non-AA >60 12/30/2021 02:36 AM    Calcium 8.1 (L) 12/30/2021 02:36 AM    Bilirubin, total 0.6 12/29/2021 05:13 AM    Alk. phosphatase 72 12/29/2021 05:13 AM    Protein, total 6.1 (L) 12/29/2021 05:13 AM    Albumin 2.9 (L) 12/29/2021 05:13 AM    Globulin 3.2 12/29/2021 05:13 AM    A-G Ratio 0.9 (L) 12/29/2021 05:13 AM    ALT (SGPT) 40 12/29/2021 05:13 AM    AST (SGOT) 29 12/29/2021 05:13 AM           CARDIAC DIAGNOSTICS:     Cardiac Evaluation Includes:  I reviewed the results below.      Echo 5/24/21 - LVEF 50%  Echo 12/23/21- TDS, LVEF 55%, RV dysfunction, poor quality bubble study     Chest CTA 12/22/21 - 1. Negative for pulmonary embolus. 2. Trace right pleural effusion with mild bibasilar atelectasis. [place] [address] 12/27/21   · Elevated right and left sided filling pressures  · Right to left shunting at atrial level  · Mildly elevated SVR and PVR  · Severe pulmonary hypertension   RAP=    21/29/20 mmHg  RVSP=  80/35  mmHg  PAP=     70/45/53 mmHg  PCWP=  41/44/39  mmHg  CO=       Slim 5.27 thermal 3.6  L/min  CI=         Slim 2.75 thermal 1.88   L/min/m2     AO= 135/100/114 mmHg      Cardiac MRI 12/29/21 -   1. There is no atrial septal defect or ventricular septal defect. There is a  small patent foramen ovale with small right-to-left shunt. Significant bowing of  the interatrial septum towards the left side indicate high right atrial  pressure. All pulmonary veins visualized draining into the left atrium. There is  no sinus venosus defect. There is no persistent left SVC. 2. Dilated right ventricle with moderate to severe right ventricular systolic  dysfunction. Moderate to severe global hypokinesis. 3. Normal left ventricular systolic function with mild systolic dysfunction. Mild global hypokinesis. LVEF 40-45%. 4. Markedly dilated right atrium. 5. Mild 1+ tricuspid regurgitation. 6. Severe scoliosis with loss of lung volume.   7. Trace to small right-sided pleural effusion.           EKG 12/22/21 - NSR, SRINIVASAN, LPFB        ASSESSMENT AND PLAN:     Assessment and Plan:    1) Respiratory Failure / Pulm HTN  - he has had problems with Hypoxia, Hypercapnia, Pulm htn  - Cardiac MRI shows Biventricular dysfunction, right significantly greater than left  - Cardiac MRI also demonstrates severe distortion of patient's thoracic cavity related to his orthopedic syndrome.  - Suspect he has severe restrictive and obstructive lung physiology that has caused severe pulmonary hypertension.  - continue Home O2 (Says O2 sats 96% at home)   - continue hydralazine as a vasodilator --> will increase dose if BP is high at home   - He has not tolerated diuretics in past (BP drops low)   - He sees Pulmonary next week   - Plan to recheck a RHC once he is treated with O2 for a few weeks --> Also will plan to perform vasodilatory challenge with nitric oxide at the time of his right heart catheterization (at [facility])     2) HTN  - cont meds   - I asked him to follow BP at home and send us results in a week to help titrate meds   ---> plan to increase hydralazine if BP is high at home      3) PFO   - Patient will not be a candidate for for PFO closure due to severe pulmonary artery hypertension, can consider closure in the future pending clinical course and change in his pulmonary pressures with medical management.    4) See me in one month. Accompanied by father to office visit. Mandeep Johnson MD, [place] [address]. [address], [facility]. Angela Douglas.  [address]  Ph: [phone]   Ph [phone]      Total time (preparing to see the patient, reviewing data, seeing patient face to fine, counseling patient, documenting information, etc) was 50  min. ADDENDUM   1/19/2022   Dr. Mira Brandon emailed 1/19/22 - \"I saw Mr. 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 In this setting, pulmonary vasodilator therapy or testing would be contraindicated. He will need aggressive diuresis and hopefully PH should improve. We are trying to set him up for bipap for his hypercarbic resp failure.  Perhaps allowing time to optimize his volume status and resp failure and repeating RHC with LVEDP assessment in 3-4 months may be beneficial to get an accurate filling pressure and correlate with the wedge pressure. His thoracic cage restriction is not modifiable. \"    Will refer patient to Dr. Randall Hanson at HF clinic. Will try and diurese patient. ADDENDUM   1/20/2022  I called and spoke to patient. He is taking lasix just 20 mg every few days. /90. I asked him to try and taking lasix 20 mg daily (to start)   He had problems with aggressive diuresis in past with hypotension so will hold off on BID lasix for now. Plan to recheck RHC once he is more diuresed. Also refer to Dr. Randall Hanson.

## 2022-01-06 NOTE — PROGRESS NOTES
Rodrick Lr is a 49 y.o. male    Chief Complaint   Patient presents with   [facility] Follow Up    CHF    Hypertension       Chest pain : no  SOB : yes  Dizziness : no  Edema : no  Refills : no    Visit Vitals  Wt 175 lb (79.4 kg)   BMI 27.41 kg/m²       1. Have you been to the ER, urgent care clinic since your last visit? Hospitalized since your last visit? yes    2. Have you seen or consulted any other health care providers outside of the [address] [place] [address] since your last visit? Include any pap smears or colon screening.

## 2022-01-20 RX ORDER — FUROSEMIDE 20 MG/1
20 TABLET ORAL DAILY
Qty: 90 TABLET | Refills: 3 | Status: SHIPPED | OUTPATIENT
Start: 2022-01-20 | End: 2022-03-01

## 2022-02-09 ENCOUNTER — TELEPHONE (OUTPATIENT)
Dept: CARDIOLOGY CLINIC | Age: 57
End: 2022-02-09

## 2022-02-09 NOTE — TELEPHONE ENCOUNTER
New referral received from Chantal Pitts. Patient has been contacted to schedule an appointment. He is scheduled to see Dr. Rosmery Foster in March and will discuss referral with both physician and his wife.       He will call us back

## 2022-03-01 ENCOUNTER — OFFICE VISIT (OUTPATIENT)
Dept: CARDIOLOGY CLINIC | Age: 57
End: 2022-03-01
Payer: COMMERCIAL

## 2022-03-01 VITALS
RESPIRATION RATE: 18 BRPM | HEART RATE: 95 BPM | WEIGHT: 164 LBS | OXYGEN SATURATION: 94 % | HEIGHT: 67 IN | BODY MASS INDEX: 25.74 KG/M2 | TEMPERATURE: 98.3 F

## 2022-03-01 DIAGNOSIS — Z13.1 SCREENING FOR DIABETES MELLITUS: ICD-10-CM

## 2022-03-01 DIAGNOSIS — R53.83 FATIGUE, UNSPECIFIED TYPE: ICD-10-CM

## 2022-03-01 DIAGNOSIS — I50.9 ACUTE ON CHRONIC HEART FAILURE, UNSPECIFIED HEART FAILURE TYPE (HCC): Primary | ICD-10-CM

## 2022-03-01 DIAGNOSIS — Z23 ENCOUNTER FOR IMMUNIZATION: ICD-10-CM

## 2022-03-01 DIAGNOSIS — Z23 NEEDS FLU SHOT: ICD-10-CM

## 2022-03-01 DIAGNOSIS — E55.9 VITAMIN D DEFICIENCY: ICD-10-CM

## 2022-03-01 DIAGNOSIS — E61.1 IRON DEFICIENCY: ICD-10-CM

## 2022-03-01 DIAGNOSIS — R06.02 SHORTNESS OF BREATH: ICD-10-CM

## 2022-03-01 PROCEDURE — 99203 OFFICE O/P NEW LOW 30 MIN: CPT | Performed by: INTERNAL MEDICINE

## 2022-03-01 PROCEDURE — 90670 PCV13 VACCINE IM: CPT

## 2022-03-01 PROCEDURE — 90472 IMMUNIZATION ADMIN EACH ADD: CPT

## 2022-03-01 PROCEDURE — 90686 IIV4 VACC NO PRSV 0.5 ML IM: CPT

## 2022-03-01 PROCEDURE — 90471 IMMUNIZATION ADMIN: CPT

## 2022-03-01 RX ORDER — SPIRONOLACTONE 50 MG/1
50 TABLET, FILM COATED ORAL DAILY
COMMUNITY
Start: 2022-02-16 | End: 2022-10-11 | Stop reason: SDUPTHER

## 2022-03-01 RX ORDER — FUROSEMIDE 20 MG/1
TABLET ORAL
Qty: 90 TABLET | Refills: 3 | Status: SHIPPED | OUTPATIENT
Start: 2022-03-01 | End: 2022-03-22

## 2022-03-01 NOTE — PROGRESS NOTES
[facility] in [place], [facility] Note    Patient name: Bella Barry  Patient : 1965  Patient MRN: [id number]  Date of service: 22    Primary care physician: Isidra Liang MD  Primary general cardiologist: Dr. Kash Schroeder in [facility]     Primary F cardiologist: Dang Odom MD    CHIEF COMPLAINT:  Chronic systolic heart failure    PLAN OF CARE:  · 48 y/o male with h/o Klippel-Feil syndrome (congenital fusion of cervical vertebrae and associated defects including scoliosis; however, without renal anomalies, congenital heart disease, and deafness)  · Patient underwent extensive orthopedic surgeries now c/b severe restrictive lung physiology with hypoventilation and hypercapnia syndrome and subsequent development of chronic LV<RV failure with volume overload, now improved  · Cardiac MRI (2021) revealed LVEF 40-45% and mod-severe RV failure; most likely etiology is chronic restrictive lung physiology +/- possible hypoxia/GABY  · RHC during recent hospitalization for acute heart failure (2021) revealed severe volume overload LV>RV with PVR 3.2 and  low resting cardiac index 1.8 indicating group 2 pulmonary hypertension from heart failure/volume overload; since then patient diuresed (WT from 171-175lbs to 164lbs) with improved symptoms  · My recommendation would be to diurese additional 3-4 lbs; keep system on dry side with fluid/Na restriction and repeat RHC after 3 months of bipap treatment; wedge must be < 18 to accurately assess whether there is any component of group 3 in addition to group 2 pulmonary hypertension; would not use nitric challenge as it can worsen pulmonary edema (rise of wedge/cause shunt) with group 2 or 3.     PLAN:  Continue current medical therapy for heart failure  Continue current dose of coreg 12.5mg twice daily  Continue hydralazine 25mg twice daily  Patient not taking ACEi/ARB/ARNi; borderline high K+; may consider next visit  Continue current dose of spironolactone 50mg daily  Does not take SGLT2 inhibitor; may benefit  Continue current dose of lasix 20mg daily  Not on allopurinol or uloric, check uric acid level  Not taking aspirin  Not taking statins, check lipid profile, CPK and LFT  BiPAP was denied by [employer]; appeal pending (we are happy to help with appeal, as I believe this would greatly benefit his heart failure)  Screening HF labs today  Offered genetic testing for cardiomyopathy  Reinforced low salt diet  Reinforced fluid restriction to 6 x 8oz glasses per day  Provided educational materials \"Living with heart failure\"   Provided advanced care plan forms to be filled out    Follow-up with primary cardiologist Dr. Ubaldo Morgan and cardiologist in [address] [place] [address] flu, covid and penumonia vaccinations; will give flu and pneumonia today  Obtain medical records from pulmonary  Return to [facility] Clinic in one month with MD to review lab results    IMPRESSION:  Volume overload, mild  Chronic systolic heart failure   Stage C, NYHA class II/III symptoms   Non-ischemic cardiomyopathy, LVEF 40-45%   Right ventricular dysfunction, moderate to severe (by cMRI 2021)   Severe pulmonary hypertension, group 2 +/- 3   PFO with small R to L shunt  Klippel-Feil syndrome with h/o extensive back surgery in   · Restrictive lung disease, severe  · Scoliosis  · Hypoventilation (hypercapnia, hypoxia)  Cardiac risk factors   HTN   HL   High risk GABY (negative sleep study per patient)   Polycythemia  Klippel-Feil syndrome (congenital fusion of variable numbers of cervical vertebrae and associated defects including scoliosis) without renal anomalies, congenital heart disease, and deafness)  Family h/o club foot, cleft palate  Family h/o early cardiomyopathy (brother  of HF in 45s)    CARDIAC IMAGING:  Echo (21)    Left Ventricle: Left ventricle size is normal. Normal wall thickness. The EF by visual approximation is 55 - 60%.   Interatrial Septum: Poor quality bubble study but there does appear to be bubbles crossing over from right to left .   Right Atrium: apppears mildly dilated.   Technical qualifiers: Echo study was technically difficult due to patient's body habitus.     Normal EF with a poor quality bubble study. There does appear bubbles passed from the right or to the left but again was an adequate study. There is any concern of a ASD or VSD would recommend a cardiac MRI. Echo (5/24/21)  · LV: Calculated LVEF is 50%. Normal cavity size and wall thickness. Low normal systolic function. · RV: Borderline low systolic function. ADAN (12/28/21)  · ADAN aborted after patient had respiratory arrest with light sedation. See hospital notes for details. EKG (12/22/21) NSR 91bpm, LAFB, QRS 86ms, QTc 462ms, non-specific ST-T changes    RHC (12/27/21)  [place] catheter inserted via right internal jugular vein. PA pressure = 70/45 mmHg. PA Sat = 60.6 %. High RA pressure = 25/19 mmHg. High RA mean = 19 mmHg. High RA Sat = 76.2 %. Mid RA Sat = 68.2 %. Low RA mean = 24 mmHg. Low RA Sat = 61.9 %. RV pressure = 80/35 mmHg. RV Sat = 60.8 %. Wedge pressure = 39 mmHg. Wedge SAT = 58.1 %. AO pressure = 135/100 mmHg. AO mean pressure = 114 mmHg. AO Sat = 89 %. TDCO = 3.6 L/min. Slim CO = 5.27 L/min. IVC pressure = 42/33 mmHg. IVC mean pressure = 38 mmHg. IVC Sat = 49.5 %. SVC Sat = 69.5 %. Cardiac MRI 12/29/21 -   1. There is no atrial septal defect or ventricular septal defect. There is a  small patent foramen ovale with small right-to-left shunt. Significant bowing of  the interatrial septum towards the left side indicate high right atrial  pressure. All pulmonary veins visualized draining into the left atrium. There is  no sinus venosus defect. There is no persistent left SVC.   2. Dilated right ventricle with moderate to severe right ventricular systolic  dysfunction. Moderate to severe global hypokinesis. 3. Normal left ventricular systolic function with mild systolic dysfunction. Mild global hypokinesis. LVEF 40-45%. 4. Markedly dilated right atrium. 5. Mild 1+ tricuspid regurgitation. 6. Severe scoliosis with loss of lung volume. 7. Trace to small right-sided pleural effusion    OTHER IMAGING:  CT chest (12/22/21)  1. Negative for pulmonary embolus. 2. Trace right pleural effusion with mild bibasilar atelectasis. Juliana Reis HISTORY OF PRESENT ILLNESS:  I had the pleasure of seeing Rebecca Nguyen in [facility] at [address] in [place]. Briefly, Rebecca Nguyen is a 49 y.o. male with complicated history noted above referred to [facility] for further recommendations. Patient has a cardiologist in [place]. INTERVAL HISTORY:  Today, patient presents for initial clinic visit accompanied by his father. Patient is doing much better after diuresis, lost additional 9 lbs since hospital discharge. Patient walked to our clinic from parking garage without having to slow down or stop. Patient can walk more than one block without symptoms of fatigue or shortness of breath or chest pain. Patient can walk one flight of stairs without symptoms of fatigue or shortness of breath or chest pain. Patient can perform home activities without problem and routinely participates in daily walking for more than 15 minutes. Patient denies symptoms of volume overload or leg edema. Patient denies abdominal bloating or change of appetite. Patient's weight remained stable. Patient denies orthopnea, PND or nocturia. Patient denies irregular heart rate or palpitations. No presyncope or syncope. Patient denies other cardiac symptoms such as chest pain or leg pain with walking. Patient is compliant with fluid restriction and taking medications as prescribed. Patient manages his own medications. REVIEW OF SYSTEMS:  General: Denies fever, night sweats. Ear, nose and throat: Denies difficulty hearing, sinus problems, runny nose, post-nasal drip, ringing in ears, mouth sores, loose teeth, ear pain, nosebleeds, sore throate, facial pain or numbess  Cardiovascular: see above in the interval history  Respiratory: Denies cough, wheezing, sputum production, hemoptysis. Gastrointestinal: Denies heartburn, constipation, diarrhea, abdominal pain, nausea, vomiting, difficulty swallowing, blood in stool  Kidney and bladder: Denies painful urination, frequent urination, urgency  Musculoskeletal: Denies joint pain, muscle weakness  Skin and hair: Denies change in existing skin lesions, hair loss or increase, breast changes    PHYSICAL EXAM:  Visit Vitals  Pulse 95   Temp 98.3 °F (36.8 °C) (Oral)   Resp 18   Ht 5' 7\" (1.702 m)   Wt 164 lb (74.4 kg)   SpO2 94%   BMI 25.69 kg/m²     General: Patient is well developed, well-nourished in no acute distress  HEENT: Normocephalic and atraumatic. No scleral icterus. Pupils are equal, round and reactive to light and accomodation. No conjunctival injection. Oropharynx is clear. Neck: Supple. No evidence of thyroid enlargements or lymphadenopathy. JVD: Cannot be appreciated   Lungs: Breath sounds are equal and clear bilaterally. No wheezes, rhonchi, or rales. Heart: Regular rate and rhythm with normal S1 and S2. No murmurs, gallops or rubs. Abdomen: Soft, no mass or tenderness. No organomegaly or hernia. Bowel sounds present. Genitourinary and rectal: deferred  Extremities: No cyanosis, clubbing, or edema. Neurologic: No focal sensory or motor deficits are noted. Grossly intact. Psychiatric: Awake, alert an doriented x 3. Appropriate mood and affect. Skin: Warm, dry and well perfused. No lesions, nodules or rashes are noted.     PAST MEDICAL HISTORY:  Past Medical History:   Diagnosis Date    Heart failure ([address]     Hypertension     Klippel-Feil syndrome 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    Pulmonary hypertension ([facility]     Restrictive lung disease     Right ventricular dysfunction     Scoliosis        PAST SURGICAL HISTORY:  Past Surgical History:   Procedure Laterality Date    HX ORTHOPAEDIC         FAMILY HISTORY:  Family History   Problem Relation Age of Onset    Heart Disease Brother        SOCIAL HISTORY:  Social History     Socioeconomic History    Marital status:    Tobacco Use    Smoking status: Never Smoker    Smokeless tobacco: Never Used   Substance and Sexual Activity    Alcohol use: Yes     Comment: socially     Drug use: Never       LABORATORY RESULTS:  No flowsheet data found. ALLERGY:  No Known Allergies     CURRENT MEDICATIONS:    Current Outpatient Medications:     spironolactone (ALDACTONE) 50 mg tablet, Take 50 mg by mouth daily. , Disp: , Rfl:     furosemide (Lasix) 20 mg tablet, Take one tablet once a day. May take an additional tablet as needed for shortness of breath, weight gain, or noticeable swelling., Disp: 90 Tablet, Rfl: 3    carvediloL (COREG) 12.5 mg tablet, Take 1 Tablet by mouth two (2) times daily (with meals). , Disp: 60 Tablet, Rfl: 0    hydrALAZINE (APRESOLINE) 25 mg tablet, Take 1 Tablet by mouth two (2) times a day. Indications: chronic heart failure, Disp: 60 Tablet, Rfl: 0    ascorbic acid, vitamin C, (Vitamin C) 500 mg tablet, Take 500 mg by mouth daily. When patient remembers, Disp: , Rfl:     therapeutic multivitamin (THERAGRAN) tablet, Take 1 Tablet by mouth daily. (Patient not taking: Reported on 3/1/2022), Disp: , Rfl:     Thank you for your referral and allowing me to participate in this patient's care.     Mary Alice Miranda MD PhD  [address] [place] [address], [address]  Phone: [phone]  Fax: [phone]    PATIENT CARE TEAM:  Patient Care Team:  Ramon Christopher MD as PCP - General (Internal Medicine)  Maxine Soto MD as PCP - [facility] Empaneled Provider     Total visit time: 40 minutes (> 50% spent face-to-face counseling)

## 2022-03-01 NOTE — PATIENT INSTRUCTIONS
Medication changes:    Lasix 20mg- take one tablet once a day . You make take an additional tablet daily for weight gain 2lbs or more overnight or 5 pounds in one week. Please take this to your pharmacy to notify them of the change in medications. Testing Ordered:    Flu and pneumonia vaccines given  In clinic. Lab slips provided in clinic. Please present to local lab velia in a fasting state to have labs drawn. You will be notified of any abnormal results that require a change in medication regimen. Genetic testing completed in clinic       Other Recommendations:     Mild to Moderate exercise is recommended like walking 15 to 30 minutes daily. Please stop exercise if your heart rate is above 140. Please follow a sodium restriction less than 2000 mg of sodium daily. Please follow a fluid restriction of 48 ounces daily     Call your pulmonary provider and follow up about cpap machine        Ensure your drinking an adequate amount of water with a goal of 6-8 eight ounce glasses (1.5-2 liters) of fluid daily. Your urine should be clear and light yellow straw colored. If your blood pressure begins to consistently run below 90/60 and/or you begin to experience dizziness or lightheadedness, please contact the [facility] at [phone]. Follow up 1 month virtually with Dr. Alex Cabrera with [facility]      Please monitor your weights daily upon waking and after using the bathroom. Keep a written records of your weights and bring to your next appointment. If you have a weight gain of 3 or more pounds overnight OR 5 or more pounds in one week please contact our office. Thank you for allowing us the privilege of being a part of your healthcare team! Please do not hesitate to contact our office at [phone] with any questions or concerns.        Virtual Heart Failure [address] invites you to learn more about heart failure and to share your questions, ideas, and experiences with others. Each month, the Heart Failure Support Group features a new educational topic and a guest speaker, followed by an interactive discussion. Our Heart Failure Nurse Navigator will moderate each session. You will be able to participate by phone, tablet or computer through [address] [place] [address]. This support group takes place on the 3rd Thursday of each month from 6:00-7:30PM. All individuals living with heart failure and their caregivers are welcome to join. If you are interested in participating, please contact us at [email or web address] and you will be sent the link to join the [employer]. Vaccine Information Statement    Pneumococcal Conjugate Vaccine (PCV13): What You Need to Know    Many vaccine information statements are available in Russian and other languages. See www.immunize.org/vis. Hojas de información sobre vacunas están disponibles en español y en muchos otros idiomas. Visite www.immunize.org/vis. 1. Why get vaccinated? Pneumococcal conjugate vaccine (PCV13) can prevent pneumococcal disease. Pneumococcal disease refers to any illness caused by pneumococcal bacteria. These bacteria can cause many types of illnesses, including pneumonia, which is an infection of the lungs. Pneumococcal bacteria are one of the most common causes of pneumonia. Besides pneumonia, pneumococcal bacteria can also cause:   Ear infections   Sinus infections   Meningitis (infection of the tissue covering the brain and spinal cord)   Bacteremia (infection of the blood)    Anyone can get pneumococcal disease, but children under 3years old, people with certain medical conditions, adults 72 years or older, and cigarette smokers are at the highest risk. Most pneumococcal infections are mild. However, some can result in long-term problems, such as brain damage or hearing loss.  Meningitis, bacteremia, and pneumonia caused by pneumococcal disease can be fatal. 2. PCV13     PCV13 protects against 13 types of bacteria that cause pneumococcal disease. Infants and young children usually need 4 doses of pneumococcal conjugate vaccine, at ages 3, 3, 10, and 12-15 months. Older children (through age 62 months) may be vaccinated if they did not receive the recommended doses. A dose of PCV13 is also recommended for adults and children 6 years or older with certain medical conditions if they did not already receive PCV13. This vaccine may be given to healthy adults 72 years or older who did not already receive PCV13, based on discussions between the patient and health care provider. 3. Talk with your health care provider    Tell your vaccination provider if the person getting the vaccine:   Has had an allergic reaction after a previous dose of PCV13, to an earlier pneumococcal conjugate vaccine known as PCV7, or to any vaccine containing diphtheria toxoid (for example, DTaP), or has any severe, life-threatening allergies    In some cases, your health care provider may decide to postpone PCV13 vaccination until a future visit. People with minor illnesses, such as a cold, may be vaccinated. People who are moderately or severely ill should usually wait until they recover before getting PCV13. Your health care provider can give you more information. 4. Risks of a vaccine reaction     Redness, swelling, pain, or tenderness where the shot is given, and fever, loss of appetite, fussiness (irritability), feeling tired, headache, and chills can happen after PCV13 vaccination. Jenn Nasrin children may be at increased risk for seizures caused by fever after PCV13 if it is administered at the same time as inactivated influenza vaccine. Ask your health care provider for more information. People sometimes faint after medical procedures, including vaccination. Tell your provider if you feel dizzy or have vision changes or ringing in the ears.     As with any medicine, there is a very remote chance of a vaccine causing a severe allergic reaction, other serious injury, or death. 5. What if there is a serious problem? An allergic reaction could occur after the vaccinated person leaves the clinic. If you see signs of a severe allergic reaction (hives, swelling of the face and throat, difficulty breathing, a fast heartbeat, dizziness, or weakness), call 9-1-1 and get the person to the nearest hospital.    For other signs that concern you, call your health care provider. Adverse reactions should be reported to the Vaccine Adverse Event Reporting System (VAERS). Your health care provider will usually file this report, or you can do it yourself. Visit the VAERS website at www.vaers. [facility].gov or call 2-310.220.2820. VAERS is only for reporting reactions, and VAERS staff members do not give medical advice. 6. The National Vaccine Injury Compensation Program    The [facility] Vaccine Injury Compensation Program (VICP) is a federal program that was created to compensate people who may have been injured by certain vaccines. Claims regarding alleged injury or death due to vaccination have a time limit for filing, which may be as short as two years. Visit the VICP website at www.hrsa.gov/vaccinecompensation or call 2-713.230.5584 to learn about the program and about filing a claim. 7. How can I learn more?  Ask your health care provider.  Call your local or state health department.  Visit the website of the Food and Drug Administration (FDA) for vaccine package inserts and additional information at www.fda.gov/vaccines-blood-biologics/vaccines.  Contact the Centers for Disease Control and Prevention (CDC):  - Call 4-940.117.6316 (1-800-CDC-INFO) or  - Visit CDCs website at www.cdc.gov/vaccines. Vaccine Information Statement   PCV13   8/6/2021  42 [address] 104DG-54   Department of Health and Human Services  Centers for Disease Control and Prevention    Office Use Only  Vaccine Information Statement    Influenza (Flu) Vaccine (Inactivated or Recombinant): What You Need to Know    Many vaccine information statements are available in Indonesian and other languages. See www.immunize.org/vis. Hojas de información sobre vacunas están disponibles en español y en muchos otros idiomas. Visite www.immunize.org/vis. 1. Why get vaccinated? Influenza vaccine can prevent influenza (flu). Flu is a contagious disease that spreads around the [facility] every year, usually between October and May. Anyone can get the flu, but it is more dangerous for some people. Infants and young children, people 72 years and older, pregnant people, and people with certain health conditions or a weakened immune system are at greatest risk of flu complications. Pneumonia, bronchitis, sinus infections, and ear infections are examples of flu-related complications. If you have a medical condition, such as heart disease, cancer, or diabetes, flu can make it worse. Flu can cause fever and chills, sore throat, muscle aches, fatigue, cough, headache, and runny or stuffy nose. Some people may have vomiting and diarrhea, though this is more common in children than adults. In an average year, thousands of people in the [facility] die from flu, and many more are hospitalized. Flu vaccine prevents millions of illnesses and flu-related visits to the doctor each year. 2. Influenza vaccines     CDC recommends everyone 6 months and older get vaccinated every flu season. Children 6 months through 6years of age may need 2 doses during a single flu season. Everyone else needs only 1 dose each flu season. It takes about 2 weeks for protection to develop after vaccination. There are many flu viruses, and they are always changing. Each year a new flu vaccine is made to protect against the influenza viruses believed to be likely to cause disease in the upcoming flu season.  Even when the vaccine doesnt exactly match these viruses, it may still provide some protection. Influenza vaccine does not cause flu. Influenza vaccine may be given at the same time as other vaccines. 3. Talk with your health care provider    Tell your vaccination provider if the person getting the vaccine:   Has had an allergic reaction after a previous dose of influenza vaccine, or has any severe, life-threatening allergies    Has ever had Guillain-Barré Syndrome (also called GBS)    In some cases, your health care provider may decide to postpone influenza vaccination until a future visit. Influenza vaccine can be administered at any time during pregnancy. People who are or will be pregnant during influenza season should receive inactivated influenza vaccine. People with minor illnesses, such as a cold, may be vaccinated. People who are moderately or severely ill should usually wait until they recover before getting influenza vaccine. Your health care provider can give you more information. 4. Risks of a vaccine reaction     Soreness, redness, and swelling where the shot is given, fever, muscle aches, and headache can happen after influenza vaccination.  There may be a very small increased risk of Guillain-Barré Syndrome (GBS) after inactivated influenza vaccine (the flu shot). Helen Staton children who get the flu shot along with pneumococcal vaccine (PCV13) and/or DTaP vaccine at the same time might be slightly more likely to have a seizure caused by fever. Tell your health care provider if a child who is getting flu vaccine has ever had a seizure. People sometimes faint after medical procedures, including vaccination. Tell your provider if you feel dizzy or have vision changes or ringing in the ears. As with any medicine, there is a very remote chance of a vaccine causing a severe allergic reaction, other serious injury, or death. 5. What if there is a serious problem?     An allergic reaction could occur after the vaccinated person leaves the clinic. If you see signs of a severe allergic reaction (hives, swelling of the face and throat, difficulty breathing, a fast heartbeat, dizziness, or weakness), call 9-1-1 and get the person to the nearest hospital.    For other signs that concern you, call your health care provider. Adverse reactions should be reported to the Vaccine Adverse Event Reporting System (VAERS). Your health care provider will usually file this report, or you can do it yourself. Visit the VAERS website at www.vaers. [facility].gov or call 9-175.876.3586. VAERS is only for reporting reactions, and VAERS staff members do not give medical advice. 6. The National Vaccine Injury Compensation Program    The [facility] Vaccine Injury Compensation Program (VICP) is a federal program that was created to compensate people who may have been injured by certain vaccines. Claims regarding alleged injury or death due to vaccination have a time limit for filing, which may be as short as two years. Visit the VICP website at www.[facility].gov/vaccinecompensation or call 3-530.752.9967 to learn about the program and about filing a claim. 7. How can I learn more?  Ask your health care provider.  Call your local or state health department.  Visit the website of the Food and Drug Administration (FDA) for vaccine package inserts and additional information at www.fda.gov/vaccines-blood-biologics/vaccines.  Contact the Centers for Disease Control and Prevention (CDC):  - Call 7-702.327.1488 (1-800-CDC-INFO) or  - Visit CDCs influenza website at www.cdc.gov/flu. Vaccine Information Statement   Inactivated Influenza Vaccine   8/6/2021  42 [address]UG[address]ania 167SA-19   Department of Health and Human Services  Centers for Disease Control and Prevention    Office Use Only

## 2022-03-18 PROBLEM — J96.01 ACUTE RESPIRATORY FAILURE WITH HYPOXIA (HCC): Status: ACTIVE | Noted: 2021-12-22

## 2022-03-18 PROBLEM — I50.9 ACUTE ON CHRONIC HEART FAILURE (HCC): Status: ACTIVE | Noted: 2021-12-22

## 2022-03-19 PROBLEM — I27.20 SEVERE PULMONARY HYPERTENSION (HCC): Status: ACTIVE | Noted: 2021-12-30

## 2022-04-05 ENCOUNTER — TELEPHONE (OUTPATIENT)
Dept: CARDIOLOGY CLINIC | Age: 57
End: 2022-04-05

## 2022-04-05 ENCOUNTER — VIRTUAL VISIT (OUTPATIENT)
Dept: CARDIOLOGY CLINIC | Age: 57
End: 2022-04-05
Payer: COMMERCIAL

## 2022-04-05 DIAGNOSIS — I50.9 ACUTE ON CHRONIC HEART FAILURE, UNSPECIFIED HEART FAILURE TYPE (HCC): Primary | ICD-10-CM

## 2022-04-05 PROCEDURE — 99215 OFFICE O/P EST HI 40 MIN: CPT | Performed by: INTERNAL MEDICINE

## 2022-04-05 NOTE — TELEPHONE ENCOUNTER
Prior auth completed for Jardiance via covermymeds, awaiting response.      Prior auth approved, I notified pharmacy, copay will be $0

## 2022-04-05 NOTE — TELEPHONE ENCOUNTER
Called patients pharmacy and spoke with tr. He states patients Jardiance medication will need prior authorization. Advised him I will complete prior authorization then call back.  Meryle Libman RN

## 2022-04-05 NOTE — PATIENT INSTRUCTIONS
Medication changes:    Start jardiance 10mg- take one tablet once a day     Please call if you start to experience any UTI symptoms like foul smelling urine, burning with urination, or frequency     Please take this to your pharmacy to notify them of the change in medications. Testing Ordered:    Please present to a local lab [facility] one week prior to your next appt. You will be notified of any abnormal results that require a change in medication regimen. Other Recommendations:     A referral to [facility]  has been placed. You will be contacted for scheduling. [employer] genetic testing results have been reviewed today. Your testing results qualify for complimentary genetic counseling through [facility]. Please visit     [email or web address] to schedule your telephone genetic counseling appointment. Your RQ number from your test is [id number]. You will be asked for this number when you schedule the appointment. Your [facility] Genetic Testing results qualify for complimentary family testing for up to two blood relatives. If you would like to proceed with family testing, please contact the [address] [place] [address] at [phone] Option 2 to provide the following information:    -Name of Family Member  -Date of Birth  -Home Address  -Phone Number  -Email (Optional)   -Any cardiac history (Optional)    Our office will order the test and a testing kit will be sent directly to the home of your family member. Instructions for specimen collection and shipping material to return the specimen to [employer] will be provided with the kit. This test is complimentary and is at no cost to you or your family members. Ensure your drinking an adequate amount of water with a goal of 6-8 eight ounce glasses (1.5-2 liters) of fluid daily. Your urine should be clear and light yellow straw colored.       If your blood pressure begins to consistently run below 90/60 and/or you begin to experience dizziness or lightheadedness, please contact the [facility] at [phone]. Follow up 4 to 6 weeks with [facility]      Please monitor your weights daily upon waking and after using the bathroom. Keep a written records of your weights and bring to your next appointment. If you have a weight gain of 3 or more pounds overnight OR 5 or more pounds in one week please contact our office. Thank you for allowing us the privilege of being a part of your healthcare team! Please do not hesitate to contact our office at [phone] with any questions or concerns. Virtual Heart Failure [address] invites you to learn more about heart failure and to share your questions, ideas, and experiences with others. Each month, the Heart Failure Support Group features a new educational topic and a guest speaker, followed by an interactive discussion. Our Heart Failure Nurse Navigator will moderate each session. You will be able to participate by phone, tablet or computer through [employer]. This support group takes place on the 3rd Thursday of each month from 6:00-7:30PM. All individuals living with heart failure and their caregivers are welcome to join. If you are interested in participating, please contact us at [email or web address] and you will be sent the link to join the [employer].

## 2022-04-05 NOTE — PROGRESS NOTES
[facility] in [facility], [address] Note    Patient name: Yunier Crook  Patient : 1965  Patient MRN: [id number]  Date of service: 22    Primary care physician: Calixto Ortiz MD  Primary general cardiologist: Dr. Yovany Reddy 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 and associated defects including scoliosis; however, without renal anomalies, congenital heart disease, and deafness)  · Patient underwent extensive orthopedic surgeries now c/b severe restrictive lung physiology with hypoventilation and hypercapnia syndrome; BiPAP therapy was recommended by pulmonary but declined by insurance; ongoing appeals - important: BiPAP therapy may have a significant impact not only on improved ventilation and QOL, but also on cardiac recovery and RV protection  · Patient subsequently developed chronic LV<RV failure and was admitted with with volume overload, now improved/resolved, cardiac MRI (2021) revealed LVEF 40-45% and mod-severe RV failure; most likely etiology is a combination of chronic restrictive lung physiology +/- possible hypoxia/GABY +/- underlying TTN inherited cardiomypathy  · RHC during recent hospitalization for acute heart failure (2021) revealed severe volume overload LV>RV with PVR 3.2 and  low resting cardiac index 1.8 indicating group 2 pulmonary hypertension; since then patient diuresed (weight from 171-175lbs to 157lbs) with improved symptoms  · Patient is now up-titrated on GDMT for heart failure (limited by hypotension and hyperkalemia); would recommend echo and RHC after 3 months of euvolemia (mid-) +/- bipap treatment  · At time of RHC wedge must be < 18 to accurately assess whether there is any component of group 3 in addition to group 2 pulmonary hypertension; would not use nitric challenge as it can worsen pulmonary edema (rise of wedge/cause shunt) with group 2 or 3.  · Patient was referred to Dr. Keaton Burton with [facility] due to TNN variant.     PLAN:  Continue current medical therapy for heart failure  Continue current dose of coreg 12.5mg twice daily  Continue hydralazine 25mg twice daily  Patient not taking ACEi/ARB/ARNi; borderline high K+ and hemodynamics  Continue current dose of spironolactone 50mg daily  Start jardiance 10mg daily; counseled to call us if UTI/candida and that may need reduced dose of diuretic  Continue current dose of lasix 20mg daily  Not on allopurinol or uloric, check uric acid level  Not taking aspirin  Not taking statins, check lipid profile, CPK and LFT  Schedule echo and RHC after 3 months of euvolemia, around mid-June 2022  BiPAP was denied by [employer]; appeal pending - we are happy to help with appeal  Reinforced low salt diet  Reinforced fluid restriction to 6 x 8oz glasses per day  Provided educational materials \"Living with heart failure\"   Provided advanced care plan forms to be filled out    Follow-up with primary cardiologist Dr. Angelique Chatman and cardiologist in [address] [place] [address] flu, covid and penumonia vaccinations; will give flu and pneumonia today  Obtain medical records from pulmonary  Referral to Dr. 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  · Restrictive lung disease, severe  · Scoliosis  · Hypoventilation (hypercapnia, hypoxia)  Cardiac risk factors  · HTN  · HL  · High risk GABY (negative sleep study per patient)  · Polycythemia  Klippel-Feil syndrome (congenital fusion of variable numbers of cervical vertebrae and associated defects including scoliosis) without renal anomalies, congenital heart disease, and deafness)  Family h/o club foot, cleft palate  Family h/o early cardiomyopathy (brother  of HF in 45s)  · Genetic testing for cardiomyopathy; TTN variant     CARDIAC IMAGING:  Echo (21)    Left Ventricle: Left ventricle size is normal. Normal wall thickness. The EF by visual approximation is 55 - 60%.   Interatrial Septum: Poor quality bubble study but there does appear to be bubbles crossing over from right to left .   Right Atrium: apppears mildly dilated.   Technical qualifiers: Echo study was technically difficult due to patient's body habitus.     Normal EF with a poor quality bubble study.  There does appear bubbles passed from the right or to the left but again was an adequate study. Eron Fuelling is any concern of a ASD or VSD would recommend a cardiac MRI.     Echo (21)  · LV: Calculated LVEF is 50%. Normal cavity size and wall thickness. Low normal systolic function. · RV: Borderline low systolic function.     ADAN (21)  · ADAN aborted after patient had respiratory arrest with light sedation. [facility] notes for details.     EKG (21) NSR 91bpm, LAFB, QRS 86ms, QTc 462ms, non-specific ST-T changes     RHC (21)  Dundee-Tawny catheter inserted via right internal jugular vein. PA pressure = 70/45 mmHg. PA Sat = 60.6 %. High RA pressure = 25/19 mmHg. High RA mean = 19 mmHg. High RA Sat = 76.2 %. Mid RA Sat = 68.2 %. Low RA mean = 24 mmHg. Low RA Sat = 61.9 %. RV pressure = 80/35 mmHg. RV Sat = 60.8 %. Wedge pressure = 39 mmHg. Wedge SAT = 58.1 %. AO pressure = 135/100 mmHg. AO mean pressure = 114 mmHg. AO Sat = 89 %. TDCO = 3.6 L/min. Slim CO = 5.27 L/min.  IVC pressure = 42/33 mmHg. IVC mean pressure = 38 mmHg. IVC Sat = 49.5 %. SVC Sat = 69.5 %.     Cardiac MRI 12/29/21 -   1. There is no atrial septal defect or ventricular septal defect. There is a  small patent foramen ovale with small right-to-left shunt. Significant bowing of  the interatrial septum towards the left side indicate high right atrial  pressure. All pulmonary veins visualized draining into the left atrium. There is  no sinus venosus defect. There is no persistent left SVC. 2. Dilated right ventricle with moderate to severe right ventricular systolic  dysfunction. Moderate to severe global hypokinesis. 3. Normal left ventricular systolic function with mild systolic dysfunction. Mild global hypokinesis. LVEF 40-45%. 4. Markedly dilated right atrium. 5. Mild 1+ tricuspid regurgitation. 6. Severe scoliosis with loss of lung volume. 7. Trace to small right-sided pleural effusion     OTHER IMAGING:  CT chest (12/22/21)  1. Negative for pulmonary embolus. 2. Trace right pleural effusion with mild bibasilar atelectasis. .     HISTORY OF PRESENT ILLNESS:  I had the pleasure of seeing Singh Oates in [facility] at [facility] in [facility] via virtual video encounter.     Briefly, Singh Oates is a 49 y.o. male with complicated history noted above referred to [facility] for further recommendations. Patient has a cardiologist in [place].      INTERVAL HISTORY:  Today, patient presents for routine clinic visit via virtual video encounter.      Patient is doing much better after diuresis, he is walking 10,000 steps a day     Patient can walk more than one block without symptoms of fatigue or shortness of breath or chest pain. Patient can walk one flight of stairs without symptoms of fatigue or shortness of breath or chest pain.  Patient can perform home activities without problem and routinely participates in daily walking for more than 15 minutes.      Patient denies symptoms of volume overload or leg edema. Patient denies abdominal bloating or change of appetite. Patient's weight remained stable.      Patient denies orthopnea, PND or nocturia.     Patient denies irregular heart rate or palpitations. No presyncope or syncope.     Patient denies other cardiac symptoms such as chest pain or leg pain with walking.      Patient is compliant with fluid restriction and taking medications as prescribed. Patient manages his own medications. REVIEW OF SYSTEMS:  General: Denies fever, night sweats. Ear, nose and throat: Denies difficulty hearing, sinus problems, runny nose, post-nasal drip, ringing in ears, mouth sores, loose teeth, ear pain, nosebleeds, sore throate, facial pain or numbess  Cardiovascular: see above in the interval history  Respiratory: Denies cough, wheezing, sputum production, hemoptysis. Gastrointestinal: Denies heartburn, constipation, diarrhea, abdominal pain, nausea, vomiting, difficulty swallowing, blood in stool  Kidney and bladder: Denies painful urination, frequent urination, urgency  Musculoskeletal: Denies joint pain, muscle weakness  Skin and hair: Denies change in existing skin lesions, hair loss or increase, breast changes    PHYSICAL EXAM:  Patient-Reported Vitals 4/5/2022   Patient-Reported Weight 156 lbs   Patient-Reported Height -   Patient-Reported Pulse -   Patient-Reported Temperature -   Patient-Reported SpO2 -   Patient-Reported Systolic  -   Patient-Reported Diastolic -      General: Not performed.     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    Pulmonary hypertension ([address]     Restrictive lung disease     Right ventricular dysfunction     Scoliosis        PAST SURGICAL HISTORY:  Past Surgical History:   Procedure Laterality Date     ORTHOPAEDIC         FAMILY HISTORY:  Family History   Problem Relation Age of Onset    Heart Disease Brother        SOCIAL HISTORY:  Social History     Socioeconomic History    Marital status:    Tobacco Use    Smoking status: Never Smoker    Smokeless tobacco: Never Used   Substance and Sexual Activity    Alcohol use: Yes     Comment: socially     Drug use: Never       LABORATORY RESULTS:  Labs Latest Ref Rng & Units 4/1/2022   Calcium 8.7 - 10.2 mg/dL 9.5   Glucose 65 - 99 mg/dL 106(H)   BUN 6 - 24 mg/dL 20   Creatinine 0.76 - 1.27 mg/dL 0.82   Sodium 134 - 144 mmol/L 138   Potassium 3.5 - 5.2 mmol/L 4.9   Some recent data might be hidden       ALLERGY:  No Known Allergies     CURRENT MEDICATIONS:    Current Outpatient Medications:     digoxin (LANOXIN) 0.125 mg tablet, Take one tablet by mouth daily at night, Disp: 30 Tablet, Rfl: 1    furosemide (Lasix) 20 mg tablet, take one tablet as needed as directed by [facility] for shortness of breath, noticable swelling, weight gain 3 lbs overnight or 5 or more lbs in one week, Disp: 90 Tablet, Rfl: 3    carvediloL (COREG) 12.5 mg tablet, Take 1.5 Tablets by mouth two (2) times daily (with meals). , Disp: 90 Tablet, Rfl: 2    spironolactone (ALDACTONE) 50 mg tablet, Take 50 mg by mouth daily. , Disp: , Rfl:     hydrALAZINE (APRESOLINE) 25 mg tablet, Take 1 Tablet by mouth two (2) times a day. Indications: chronic heart failure, Disp: 60 Tablet, Rfl: 0    ascorbic acid, vitamin C, (Vitamin C) 500 mg tablet, Take 500 mg by mouth daily. When patient remembers, Disp: , Rfl:     therapeutic multivitamin (THERAGRAN) tablet, Take 1 Tablet by mouth daily. (Patient not taking: Reported on 3/1/2022), Disp: , Rfl:     Thank you for your referral and allowing me to participate in this patient's care.     Lucero Monge MD PhD  [address] [place] [address], [address]  Phone: [phone]  Fax: [phone]    PATIENT CARE TEAM:  Patient Care Team:  Jeancarlos Barry MD as PCP - General (Internal Medicine)  Jeancarlos Barry MD as PCP - [address] [place], [place] [address]elsy Costa Provider     VIRTUAL VIDEO VISIT DOCUMENTATION:    Leatha Cortez, was evaluated through a synchronous (real-time) audio-video encounter to substitute for in-person clinic visit. The patient (or guardian if applicable) is aware that this is a billable service, which includes applicable co-pays. This Virtual Visit was conducted with patient's (and/or legal guardian's) consent. The visit was conducted pursuant to the emergency declaration under the [facility], [facility] waiver authority and the [employer] Supplemental Appropriations Act to reduce the patient's risk of exposure to COVID-19 and provide continuity of care for an established patient. Patient identification was verified, and a caregiver was present when appropriate. The patient was located in a state where the provider was licensed to provide care. I affirm this is a Patient Initiated Episode with an Established Patient who has not had a related appointment within my department in the past 7 days or scheduled within the next 24 hours. This service was provided through telehealth; patient called from home and provider was in the office at the [address] [place] [address] Road [address].      Total Time: minutes: 40

## 2022-04-07 ENCOUNTER — TELEPHONE (OUTPATIENT)
Dept: CARDIOLOGY CLINIC | Age: 57
End: 2022-04-07

## 2022-04-07 NOTE — TELEPHONE ENCOUNTER
Patient is scheduled for a virtual appointment with   Dr. Amna Salguero      7-6-2022  4:20pm  Patient was given this information. He stated understanding and had no questions. Patient records were faxed to [phone].

## 2022-04-07 NOTE — TELEPHONE ENCOUNTER
I called Dr. Tere Roy office to schedule patient an appointment. ( [phone]) Message was left on their voice mail for a return call. Patient Instructions by Christine Go ATC at 05/09/18 09:05 AM     Author:  Christine Go ATC Service:  (none) Author Type:  Trainer     Filed:  05/09/18 10:25 AM Encounter Date:  5/9/2018 Status:  Addendum     :  Christine Go ATC ()            TREATMENT ORDERS  Special Tests:  [facility] - Please call [phone] to schedule MRI    Continue icing daily    Follow-Up  Please call or return to the office after the MRI has been completed    Time left: 5/9/2018 9:05 AM     Next appointment:        Location:        Provider:         Please note: 24 hour notice for cancellation of appointment is required. You may receive a survey in the mail, or via the e-mail address that you have provided. We would appreciate if you could fill out the survey and provide us with any feedback on your experience regarding your visit today. Thank you for allowing us to provide you with your health care needs. Do not hesitate to call if you are experiencing severe pain, worsening or change in your pain, have symptoms of infection (fever, warmth, redness, increased drainage), or have any other problem that concerns you ~ [phone] (or [phone] after hours). Please remember when requesting refills on pain medication that the request should be made by Thursday at the [address] is open Monday-Friday, 8am-5pm, and closed on the weekends. No narcotic refills will be filled after hours. Additional Educational Resources: For additional resources regarding your symptoms, diagnosis, or further health information, please visit the Health Resources section on [email or web address] or the Online Health Resources section in [employer].           Revision History        User Key Date/Time User Provider Type Action    > [N/A] 05/09/18 10:25 AM Christine Go ATC Trainer Addend     [N/A] 05/09/18 09:07 AM Christine Go ATC [place] Sign

## 2022-04-12 ENCOUNTER — TELEPHONE (OUTPATIENT)
Dept: CARDIOLOGY CLINIC | Age: 57
End: 2022-04-12

## 2022-05-14 LAB — DIGOXIN SERPL-MCNC: 0.5 NG/ML (ref 0.5–0.9)

## 2022-05-16 ENCOUNTER — TELEPHONE (OUTPATIENT)
Dept: CARDIOLOGY CLINIC | Age: 57
End: 2022-05-16

## 2022-05-16 DIAGNOSIS — I50.9 ACUTE ON CHRONIC HEART FAILURE, UNSPECIFIED HEART FAILURE TYPE (HCC): Primary | ICD-10-CM

## 2022-05-16 NOTE — TELEPHONE ENCOUNTER
Left message for patient to return call with message per Nubia Sheriff:  Please see what time of day he is taking the digoxin and if he is taking in the am, please switch to the PM. Have him take an extra 0.125mg starting tomorrow evening. Check level at his next appt/      Patient returned call, he states he is taking Digoxin in evening as directed. He will take an extra dose tonight, he has a follow up appt tomorrow in [facility].

## 2022-05-17 ENCOUNTER — TELEPHONE (OUTPATIENT)
Dept: CARDIOLOGY CLINIC | Age: 57
End: 2022-05-17

## 2022-05-17 RX ORDER — DIGOXIN 250 MCG
0.25 TABLET ORAL DAILY
Qty: 30 TABLET | Refills: 1 | Status: SHIPPED | OUTPATIENT
Start: 2022-05-17 | End: 2022-07-18

## 2022-05-17 NOTE — TELEPHONE ENCOUNTER
Telephone Call RE:  Appointment reminder     Outcome:     [x] Patient confirmed appointment   [] Patient rescheduled appointment for    [] Unable to reach  [] Left message             [] Other:     ---------------------             [x] Screened for [facility]

## 2022-05-18 ENCOUNTER — OFFICE VISIT (OUTPATIENT)
Dept: CARDIOLOGY CLINIC | Age: 57
End: 2022-05-18
Payer: COMMERCIAL

## 2022-05-18 VITALS
HEIGHT: 67 IN | SYSTOLIC BLOOD PRESSURE: 104 MMHG | HEART RATE: 93 BPM | TEMPERATURE: 98.2 F | DIASTOLIC BLOOD PRESSURE: 72 MMHG | WEIGHT: 151 LBS | BODY MASS INDEX: 23.7 KG/M2 | RESPIRATION RATE: 16 BRPM | OXYGEN SATURATION: 98 %

## 2022-05-18 DIAGNOSIS — I50.9 ACUTE ON CHRONIC HEART FAILURE, UNSPECIFIED HEART FAILURE TYPE (HCC): Primary | ICD-10-CM

## 2022-05-18 DIAGNOSIS — R06.02 SHORTNESS OF BREATH: ICD-10-CM

## 2022-05-18 LAB
25(OH)D3+25(OH)D2 SERPL-MCNC: 17.5 NG/ML (ref 30–100)
ALBUMIN SERPL ELPH-MCNC: 3.8 G/DL (ref 2.9–4.4)
ALBUMIN SERPL-MCNC: 4.6 G/DL (ref 3.8–4.9)
ALBUMIN/GLOB SERPL: 1.1 {RATIO} (ref 0.7–1.7)
ALBUMIN/GLOB SERPL: 1.7 {RATIO} (ref 1.2–2.2)
ALP SERPL-CCNC: 90 IU/L (ref 44–121)
ALPHA1 GLOB SERPL ELPH-MCNC: 0.3 G/DL (ref 0–0.4)
ALPHA2 GLOB SERPL ELPH-MCNC: 0.8 G/DL (ref 0.4–1)
ALT SERPL-CCNC: 32 IU/L (ref 0–44)
AST SERPL-CCNC: 32 IU/L (ref 0–40)
B-GLOBULIN SERPL ELPH-MCNC: 1.3 G/DL (ref 0.7–1.3)
BILIRUB SERPL-MCNC: 1 MG/DL (ref 0–1.2)
BUN SERPL-MCNC: 17 MG/DL (ref 6–24)
BUN/CREAT SERPL: 19 (ref 9–20)
CALCIUM SERPL-MCNC: 9.5 MG/DL (ref 8.7–10.2)
CENTROMERE B AB SER-ACNC: <0.2 AI (ref 0–0.9)
CHLORIDE SERPL-SCNC: 96 MMOL/L (ref 96–106)
CHOLEST SERPL-MCNC: 274 MG/DL (ref 100–199)
CHROMATIN AB SERPL-ACNC: <0.2 AI (ref 0–0.9)
CK SERPL-CCNC: 231 U/L (ref 41–331)
CO2 SERPL-SCNC: 30 MMOL/L (ref 20–29)
CREAT SERPL-MCNC: 0.88 MG/DL (ref 0.76–1.27)
CRP SERPL-MCNC: 5 MG/L (ref 0–10)
DSDNA AB SER-ACNC: <1 IU/ML (ref 0–9)
EGFR: 102 ML/MIN/1.73
ENA JO1 AB SER-ACNC: <0.2 AI (ref 0–0.9)
ENA RNP AB SER-ACNC: 0.3 AI (ref 0–0.9)
ENA SCL70 AB SER-ACNC: <0.2 AI (ref 0–0.9)
ENA SM AB SER-ACNC: <0.2 AI (ref 0–0.9)
ENA SS-A AB SER-ACNC: <0.2 AI (ref 0–0.9)
ENA SS-B AB SER-ACNC: <0.2 AI (ref 0–0.9)
ERYTHROCYTE [DISTWIDTH] IN BLOOD BY AUTOMATED COUNT: 12.9 % (ref 11.6–15.4)
EST. AVERAGE GLUCOSE BLD GHB EST-MCNC: 123 MG/DL
FERRITIN SERPL-MCNC: 173 NG/ML (ref 30–400)
FOLATE SERPL-MCNC: 16.9 NG/ML
GAMMA GLOB SERPL ELPH-MCNC: 1.2 G/DL (ref 0.4–1.8)
GLOBULIN SER CALC-MCNC: 2.7 G/DL (ref 1.5–4.5)
GLOBULIN SER-MCNC: 3.5 G/DL (ref 2.2–3.9)
GLUCOSE SERPL-MCNC: 104 MG/DL (ref 65–99)
HAV IGM SERPL QL IA: NEGATIVE
HBA1C MFR BLD: 5.9 % (ref 4.8–5.6)
HBV CORE IGM SERPL QL IA: NEGATIVE
HBV SURFACE AG SERPL QL IA: NEGATIVE
HCT VFR BLD AUTO: 47.2 % (ref 37.5–51)
HCV AB S/CO SERPL IA: <0.1 S/CO RATIO (ref 0–0.9)
HCV AB SERPL QL IA: NORMAL
HDLC SERPL-MCNC: 55 MG/DL
HGB BLD-MCNC: 16 G/DL (ref 13–17.7)
IGA SERPL-MCNC: 161 MG/DL (ref 90–386)
IGG SERPL-MCNC: 1167 MG/DL (ref 603–1613)
IGM SERPL-MCNC: 53 MG/DL (ref 20–172)
INTERPRETATION SERPL IEP-IMP: NORMAL
IRON SATN MFR SERPL: 46 % (ref 15–55)
IRON SERPL-MCNC: 164 UG/DL (ref 38–169)
KAPPA LC FREE SER-MCNC: 16.1 MG/L (ref 3.3–19.4)
KAPPA LC FREE/LAMBDA FREE SER: 1.63 {RATIO} (ref 0.26–1.65)
LAMBDA LC FREE SERPL-MCNC: 9.9 MG/L (ref 5.7–26.3)
LDLC SERPL CALC-MCNC: 201 MG/DL (ref 0–99)
M PROTEIN SERPL ELPH-MCNC: NORMAL G/DL
MAGNESIUM SERPL-MCNC: 2.3 MG/DL (ref 1.6–2.3)
MCH RBC QN AUTO: 32.2 PG (ref 26.6–33)
MCHC RBC AUTO-ENTMCNC: 33.9 G/DL (ref 31.5–35.7)
MCV RBC AUTO: 95 FL (ref 79–97)
NT-PROBNP SERPL-MCNC: 367 PG/ML (ref 0–121)
PLATELET # BLD AUTO: 307 X10E3/UL (ref 150–450)
PLEASE NOTE:, 149534: NORMAL
POTASSIUM SERPL-SCNC: 5.2 MMOL/L (ref 3.5–5.2)
PROT SERPL-MCNC: 7.3 G/DL (ref 6–8.5)
RBC # BLD AUTO: 4.97 X10E6/UL (ref 4.14–5.8)
SEE BELOW, 164869: NORMAL
SODIUM SERPL-SCNC: 139 MMOL/L (ref 134–144)
T3FREE SERPL-MCNC: 3 PG/ML (ref 2–4.4)
T4 FREE SERPL-MCNC: 1.34 NG/DL (ref 0.82–1.77)
TIBC SERPL-MCNC: 353 UG/DL (ref 250–450)
TRIGL SERPL-MCNC: 103 MG/DL (ref 0–149)
TROPONIN T SERPL HS-MCNC: 36 NG/L (ref 0–22)
TSH SERPL DL<=0.005 MIU/L-ACNC: 2.1 UIU/ML (ref 0.45–4.5)
UIBC SERPL-MCNC: 189 UG/DL (ref 111–343)
URATE SERPL-MCNC: 5.8 MG/DL (ref 3.8–8.4)
VIT B12 SERPL-MCNC: 405 PG/ML (ref 232–1245)
VLDLC SERPL CALC-MCNC: 18 MG/DL (ref 5–40)
WBC # BLD AUTO: 7.9 X10E3/UL (ref 3.4–10.8)

## 2022-05-18 PROCEDURE — 94618 PULMONARY STRESS TESTING: CPT | Performed by: NURSE PRACTITIONER

## 2022-05-18 PROCEDURE — 99214 OFFICE O/P EST MOD 30 MIN: CPT | Performed by: NURSE PRACTITIONER

## 2022-05-18 NOTE — PROGRESS NOTES
[facility] in [place], [facility] Dilan Taylor, [address] Note    Patient name: Candi Ramos  Patient : 1965  Patient MRN: [id number]  Date of service: 22    Primary care physician: Ramón Sunshine MD  Primary general cardiologist: Dr. 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 and associated defects including scoliosis; however, without renal anomalies, congenital heart disease, and deafness)  · Patient underwent extensive orthopedic surgeries now c/b severe restrictive lung physiology with hypoventilation and hypercapnia syndrome; BiPAP therapy was recommended by pulmonary but declined by insurance; ongoing appeals - important: BiPAP therapy may have a significant impact not only on improved ventilation and QOL, but also on cardiac recovery and RV protection  · Patient subsequently developed chronic LV<RV failure and was admitted with with volume overload, now improved/resolved, cardiac MRI (2021) revealed LVEF 40-45% and mod-severe RV failure; most likely etiology is a combination of chronic restrictive lung physiology +/- possible hypoxia/GABY +/- underlying TTN inherited cardiomypathy  · RHC during recent hospitalization for acute heart failure (2021) revealed severe volume overload LV>RV with PVR 3.2 and  low resting cardiac index 1.8 indicating group 2 pulmonary hypertension; since then patient diuresed (weight from 171-175lbs to 157lbs) with improved symptoms  · Patient is now up-titrated on GDMT for heart failure (limited by hypotension and hyperkalemia); would recommend echo and RHC after 3 months of euvolemia (mid-) +/- bipap treatment  · At time of RHC wedge must be < 18 to accurately assess whether there is any component of group 3 in addition to group 2 pulmonary hypertension; would not use nitric challenge as it can worsen pulmonary edema (rise of wedge/cause shunt) with group 2 or 3.  · Patient was referred to Dr. Amy Bah with [facility] at [facility] due to TNN variant.     PLAN:  Continue coreg 18.75mg twice daily  Continue hydralazine 25mg twice daily  Patient not taking ACEi/ARB/ARNi; borderline high K+ and hemodynamics  Continue spironolactone 50mg daily  Continue jardiance 10mg daily  Continue lasix 20mg daily  Increase dig to 0.25mg QHS  Not on allopurinol or uloric, check uric acid level  Not taking aspirin  Not taking statins, check lipid profile, CPK and LFT  Schedule echo and RHC after 3 months of euvolemia, around mid-June 2022  BiPAP was denied by [employer]; appeal pending - we are happy to help with appeal, pt not interested at this time  Will do 6MWT in clinic today.     Reinforced low salt diet  Reinforced fluid restriction to 6 x 8oz glasses per day  Follow-up with primary cardiologist Dr. Adenike Arguello and cardiologist in [facility] records from pulmonary  Referral to Dr. Amy Bah with [facility] at [facility]- scheduled in July  Offered two family members free genetic testing at our clinic     Return to [address] [facility] in one month with Dr. 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  · Restrictive lung disease, severe  · Scoliosis  · Hypoventilation (hypercapnia, hypoxia)  Cardiac risk factors  · HTN  · HL  · High risk GABY (negative sleep study per patient)  · Polycythemia  Klippel-Feil syndrome (congenital fusion of variable numbers of cervical vertebrae and associated defects including scoliosis) without renal anomalies, congenital heart disease, and deafness)  Family h/o club foot, cleft palate  Family h/o early cardiomyopathy (brother  of HF in 45s)  · Genetic testing for cardiomyopathy; TTN variant     CARDIAC IMAGING:  Echo (21)    Left Ventricle: Left ventricle size is normal. Normal wall thickness. The EF by visual approximation is 55 - 60%.   Interatrial Septum: Poor quality bubble study but there does appear to be bubbles crossing over from right to left .   Right Atrium: apppears mildly dilated.   Technical qualifiers: Echo study was technically difficult due to patient's body habitus.     Normal EF with a poor quality bubble study.  There does appear bubbles passed from the right or to the left but again was an adequate study. Juliet Ag is any concern of a ASD or VSD would recommend a cardiac MRI.     Echo (21)  · LV: Calculated LVEF is 50%. Normal cavity size and wall thickness. Low normal systolic function. · RV: Borderline low systolic function.     ADAN (21)  · ADAN aborted after patient had respiratory arrest with light sedation. [facility] notes for details.     EKG (21) NSR 91bpm, LAFB, QRS 86ms, QTc 462ms, non-specific ST-T changes     RHC (21)  [place]-Tawny catheter inserted via right internal jugular vein. PA pressure = 70/45 mmHg. PA Sat = 60.6 %. High RA pressure = 25/19 mmHg. High RA mean = 19 mmHg. High RA Sat = 76.2 %. Mid RA Sat = 68.2 %. Low RA mean = 24 mmHg. Low RA Sat = 61.9 %. RV pressure = 80/35 mmHg. RV Sat = 60.8 %. Wedge pressure = 39 mmHg. Wedge SAT = 58.1 %. AO pressure = 135/100 mmHg. AO mean pressure = 114 mmHg. AO Sat = 89 %. TDCO = 3.6 L/min. Slim CO = 5.27 L/min. IVC pressure = 42/33 mmHg. IVC mean pressure = 38 mmHg. IVC Sat = 49.5 %. SVC Sat = 69.5 %.     Cardiac MRI 21 -   1. There is no atrial septal defect or ventricular septal defect.  There is a  small patent foramen ovale with small right-to-left shunt. Significant bowing of  the interatrial septum towards the left side indicate high right atrial  pressure. All pulmonary veins visualized draining into the left atrium. There is  no sinus venosus defect. There is no persistent left SVC. 2. Dilated right ventricle with moderate to severe right ventricular systolic  dysfunction. Moderate to severe global hypokinesis. 3. Normal left ventricular systolic function with mild systolic dysfunction. Mild global hypokinesis. LVEF 40-45%. 4. Markedly dilated right atrium. 5. Mild 1+ tricuspid regurgitation. 6. Severe scoliosis with loss of lung volume. 7. Trace to small right-sided pleural effusion       HISTORY OF PRESENT ILLNESS:  I had the pleasure of seeing Claudio Whittaker in [facility] at [facility] in [place] via virtual video encounter.     Briefly, Claudio Whittaker is a 49 y.o. male with complicated history noted above referred to [facility] for further recommendations. Patient has a cardiologist in [place].        INTERVAL HISTORY:  Today, patient presents for routine clinic visit. he reports doing well.  he is able to do activity as he wants and denies significant limitation from HF symptoms. Patient can perform home activities without problem. he denies other cardiac symptoms such as chest pain or leg pain with walking. Patient denies symptoms of volume overload or leg edema. Reports a normal appetite. he denies abdominal bloating, nausea or early satiety. Patient's weight remained stable. he denies orthopnea, PND or nocturia. Denies irregular heart rate or palpitations. No presyncope or syncope. he is compliant with fluid restriction and taking medications as prescribed. Patient manages his own medications. REVIEW OF SYSTEMS:  Review of Systems   Respiratory: Negative for cough and shortness of breath.     Cardiovascular: Negative for chest pain, palpitations, orthopnea and leg swelling. Gastrointestinal: Negative for abdominal pain, constipation, diarrhea, heartburn, nausea and vomiting. Neurological: Negative for dizziness, weakness and headaches. PHYSICAL EXAM:  Visit Vitals  /72 (BP 1 Location: Left arm, BP Patient Position: Sitting, BP Cuff Size: Adult)   Pulse 93   Temp 98.2 °F (36.8 °C) (Oral)   Resp 16   Ht 5' 7\" (1.702 m)   Wt 151 lb (68.5 kg)   SpO2 98%   BMI 23.65 kg/m²     Physical Exam  Constitutional:       General: He is not in acute distress. Cardiovascular:      Rate and Rhythm: Normal rate and regular rhythm. Pulses: Normal pulses. Heart sounds: Normal heart sounds. No murmur heard. No friction rub. No gallop. Pulmonary:      Effort: Pulmonary effort is normal. No respiratory distress. Abdominal:      General: Bowel sounds are normal. There is no distension. Palpations: Abdomen is soft. Tenderness: There is no abdominal tenderness. Skin:     General: Skin is warm and dry. Capillary Refill: Capillary refill takes less than 2 seconds. Neurological:      General: No focal deficit present. Mental Status: He is alert and oriented to person, place, and time. Psychiatric:         Mood and Affect: Mood normal.         Behavior: Behavior normal.         Thought Content:  Thought content normal.         Judgment: Judgment normal.            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    Pulmonary hypertension ([address]     Restrictive lung disease     Right ventricular dysfunction     Scoliosis        PAST SURGICAL HISTORY:  Past Surgical History:   Procedure Laterality Date    HX ORTHOPAEDIC         FAMILY HISTORY:  Family History   Problem Relation Age of Onset    Heart Disease Brother        SOCIAL HISTORY:  Social History     Socioeconomic History  Marital status:    Tobacco Use    Smoking status: Never Smoker    Smokeless tobacco: Never Used   Substance and Sexual Activity    Alcohol use: Yes     Comment: socially     Drug use: Never       LABORATORY RESULTS:  Labs Latest Ref Rng & Units 4/1/2022   Calcium 8.7 - 10.2 mg/dL 9.5   Glucose 65 - 99 mg/dL 106(H)   BUN 6 - 24 mg/dL 20   Creatinine 0.76 - 1.27 mg/dL 0.82   Sodium 134 - 144 mmol/L 138   Potassium 3.5 - 5.2 mmol/L 4.9   Some recent data might be hidden       ALLERGY:  No Known Allergies     CURRENT MEDICATIONS:    Current Outpatient Medications:     digoxin (LANOXIN) 0.25 mg tablet, Take 1 Tablet by mouth daily. , Disp: 30 Tablet, Rfl: 1    empagliflozin (JARDIANCE) 10 mg tablet, Take 1 Tablet by mouth daily. , Disp: 30 Tablet, Rfl: 1    furosemide (Lasix) 20 mg tablet, take one tablet as needed as directed by [facility] for shortness of breath, noticable swelling, weight gain 3 lbs overnight or 5 or more lbs in one week (Patient taking differently: take one tablet as needed as directed by [facility] for shortness of breath, noticable swelling, weight gain 3 lbs overnight or 5 or more lbs in one week   Patient states that he is taking one tablet most every day), Disp: 90 Tablet, Rfl: 3    carvediloL (COREG) 12.5 mg tablet, Take 1.5 Tablets by mouth two (2) times daily (with meals). , Disp: 90 Tablet, Rfl: 2    spironolactone (ALDACTONE) 50 mg tablet, Take 50 mg by mouth daily. , Disp: , Rfl:     hydrALAZINE (APRESOLINE) 25 mg tablet, Take 1 Tablet by mouth two (2) times a day. Indications: chronic heart failure, Disp: 60 Tablet, Rfl: 0    ascorbic acid, vitamin C, (Vitamin C) 500 mg tablet, Take 250 mg by mouth daily. When patient remembers, Disp: , Rfl:     therapeutic multivitamin (THERAGRAN) tablet, Take 1 Tablet by mouth daily. , Disp: , Rfl:     Elena Benedict, VIVIANA  [address] [facility] [phone]  Fax [phone]      PATIENT CARE TEAM:  Patient Care Team:  Arik Muse MD as PCP - General (Internal Medicine Physician)  Arik Muse MD as PCP - [facility]

## 2022-05-18 NOTE — H&P (VIEW-ONLY)
[address] [facility] in [place], [facility] [address] Taylor[address] Note    Patient name: Brenda Blanchard  Patient : 1965  Patient MRN: [id number]  Date of service: 22    Primary care physician: Christianna Meckel, MD  Primary general cardiologist: Dr. 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 and associated defects including scoliosis; however, without renal anomalies, congenital heart disease, and deafness)  · Patient underwent extensive orthopedic surgeries now c/b severe restrictive lung physiology with hypoventilation and hypercapnia syndrome; BiPAP therapy was recommended by pulmonary but declined by insurance; ongoing appeals - important: BiPAP therapy may have a significant impact not only on improved ventilation and QOL, but also on cardiac recovery and RV protection  · Patient subsequently developed chronic LV<RV failure and was admitted with with volume overload, now improved/resolved, cardiac MRI (2021) revealed LVEF 40-45% and mod-severe RV failure; most likely etiology is a combination of chronic restrictive lung physiology +/- possible hypoxia/GABY +/- underlying TTN inherited cardiomypathy  · RHC during recent hospitalization for acute heart failure (2021) revealed severe volume overload LV>RV with PVR 3.2 and  low resting cardiac index 1.8 indicating group 2 pulmonary hypertension; since then patient diuresed (weight from 171-175lbs to 157lbs) with improved symptoms  · Patient is now up-titrated on GDMT for heart failure (limited by hypotension and hyperkalemia); would recommend echo and RHC after 3 months of euvolemia (mid-) +/- bipap treatment  · At time of RHC wedge must be < 18 to accurately assess whether there is any component of group 3 in addition to group 2 pulmonary hypertension; would not use nitric challenge as it can worsen pulmonary edema (rise of wedge/cause shunt) with group 2 or 3.  · Patient was referred to Dr. Hannah Lee with [facility]omy[facility] at [facility] due to TNN variant.     PLAN:  Continue coreg 18.75mg twice daily  Continue hydralazine 25mg twice daily  Patient not taking ACEi/ARB/ARNi; borderline high K+ and hemodynamics  Continue spironolactone 50mg daily  Continue jardiance 10mg daily  Continue lasix 20mg daily  Increase dig to 0.25mg QHS  Not on allopurinol or uloric, check uric acid level  Not taking aspirin  Not taking statins, check lipid profile, CPK and LFT  Schedule echo and RHC after 3 months of euvolemia, around mid-June 2022  BiPAP was denied by [employer]; appeal pending - we are happy to help with appeal, pt not interested at this time  Will do 6MWT in clinic today.     Reinforced low salt diet  Reinforced fluid restriction to 6 x 8oz glasses per day  Follow-up with primary cardiologist Dr. Jocelyne Gauthier and cardiologist in [facility] records from pulmonary  Referral to Dr. Hannah Lee with [facility] at [facility]- scheduled in July  Offered two family members free genetic testing at our clinic     Return to [address] in one month with Dr. 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  · Restrictive lung disease, severe  · Scoliosis  · Hypoventilation (hypercapnia, hypoxia)  Cardiac risk factors  · HTN  · HL  · High risk GABY (negative sleep study per patient)  · Polycythemia  Klippel-Feil syndrome (congenital fusion of variable numbers of cervical vertebrae and associated defects including scoliosis) without renal anomalies, congenital heart disease, and deafness)  Family h/o club foot, cleft palate  Family h/o early cardiomyopathy (brother  of HF in 45s)  · Genetic testing for cardiomyopathy; TTN variant     CARDIAC IMAGING:  Echo (21)    Left Ventricle: Left ventricle size is normal. Normal wall thickness. The EF by visual approximation is 55 - 60%.   Interatrial Septum: Poor quality bubble study but there does appear to be bubbles crossing over from right to left .   Right Atrium: apppears mildly dilated.   Technical qualifiers: Echo study was technically difficult due to patient's body habitus.     Normal EF with a poor quality bubble study.  There does appear bubbles passed from the right or to the left but again was an adequate study. Rehan Mention is any concern of a ASD or VSD would recommend a cardiac MRI.     Echo (21)  · LV: Calculated LVEF is 50%. Normal cavity size and wall thickness. Low normal systolic function. · RV: Borderline low systolic function.     ADAN (21)  · ADAN aborted after patient had respiratory arrest with light sedation. [facility] notes for details.     EKG (21) NSR 91bpm, LAFB, QRS 86ms, QTc 462ms, non-specific ST-T changes     RHC (21)  [place]-Tawny catheter inserted via right internal jugular vein. PA pressure = 70/45 mmHg. PA Sat = 60.6 %. High RA pressure = 25/19 mmHg. High RA mean = 19 mmHg. High RA Sat = 76.2 %. Mid RA Sat = 68.2 %. Low RA mean = 24 mmHg. Low RA Sat = 61.9 %. RV pressure = 80/35 mmHg. RV Sat = 60.8 %. Wedge pressure = 39 mmHg. Wedge SAT = 58.1 %. AO pressure = 135/100 mmHg. AO mean pressure = 114 mmHg. AO Sat = 89 %. TDCO = 3.6 L/min. Slim CO = 5.27 L/min. IVC pressure = 42/33 mmHg. IVC mean pressure = 38 mmHg. IVC Sat = 49.5 %. SVC Sat = 69.5 %.     Cardiac MRI 21 -   1. There is no atrial septal defect or ventricular septal defect.  There is a  small patent foramen ovale with small right-to-left shunt. Significant bowing of  the interatrial septum towards the left side indicate high right atrial  pressure. All pulmonary veins visualized draining into the left atrium. There is  no sinus venosus defect. There is no persistent left SVC. 2. Dilated right ventricle with moderate to severe right ventricular systolic  dysfunction. Moderate to severe global hypokinesis. 3. Normal left ventricular systolic function with mild systolic dysfunction. Mild global hypokinesis. LVEF 40-45%. 4. Markedly dilated right atrium. 5. Mild 1+ tricuspid regurgitation. 6. Severe scoliosis with loss of lung volume. 7. Trace to small right-sided pleural effusion       HISTORY OF PRESENT ILLNESS:  I had the pleasure of seeing Eboni Levy in [address] at [facility] in [address] via virtual video encounter.     Briefly, Eboni Levy is a 49 y.o. male with complicated history noted above referred to [facility] for further recommendations. Patient has a cardiologist in [place].        INTERVAL HISTORY:  Today, patient presents for routine clinic visit. he reports doing well.  he is able to do activity as he wants and denies significant limitation from HF symptoms. Patient can perform home activities without problem. he denies other cardiac symptoms such as chest pain or leg pain with walking. Patient denies symptoms of volume overload or leg edema. Reports a normal appetite. he denies abdominal bloating, nausea or early satiety. Patient's weight remained stable. he denies orthopnea, PND or nocturia. Denies irregular heart rate or palpitations. No presyncope or syncope. he is compliant with fluid restriction and taking medications as prescribed. Patient manages his own medications. REVIEW OF SYSTEMS:  Review of Systems   Respiratory: Negative for cough and shortness of breath.     Cardiovascular: Negative for chest pain, palpitations, orthopnea and leg swelling. Gastrointestinal: Negative for abdominal pain, constipation, diarrhea, heartburn, nausea and vomiting. Neurological: Negative for dizziness, weakness and headaches. PHYSICAL EXAM:  Visit Vitals  /72 (BP 1 Location: Left arm, BP Patient Position: Sitting, BP Cuff Size: Adult)   Pulse 93   Temp 98.2 °F (36.8 °C) (Oral)   Resp 16   Ht 5' 7\" (1.702 m)   Wt 151 lb (68.5 kg)   SpO2 98%   BMI 23.65 kg/m²     Physical Exam  Constitutional:       General: He is not in acute distress. Cardiovascular:      Rate and Rhythm: Normal rate and regular rhythm. Pulses: Normal pulses. Heart sounds: Normal heart sounds. No murmur heard. No friction rub. No gallop. Pulmonary:      Effort: Pulmonary effort is normal. No respiratory distress. Abdominal:      General: Bowel sounds are normal. There is no distension. Palpations: Abdomen is soft. Tenderness: There is no abdominal tenderness. Skin:     General: Skin is warm and dry. Capillary Refill: Capillary refill takes less than 2 seconds. Neurological:      General: No focal deficit present. Mental Status: He is alert and oriented to person, place, and time. Psychiatric:         Mood and Affect: Mood normal.         Behavior: Behavior normal.         Thought Content:  Thought content normal.         Judgment: Judgment normal.            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    Pulmonary hypertension ([address]     Restrictive lung disease     Right ventricular dysfunction     Scoliosis        PAST SURGICAL HISTORY:  Past Surgical History:   Procedure Laterality Date    HX ORTHOPAEDIC         FAMILY HISTORY:  Family History   Problem Relation Age of Onset    Heart Disease Brother        SOCIAL HISTORY:  Social History     Socioeconomic History  Marital status:    Tobacco Use    Smoking status: Never Smoker    Smokeless tobacco: Never Used   Substance and Sexual Activity    Alcohol use: Yes     Comment: socially     Drug use: Never       LABORATORY RESULTS:  Labs Latest Ref Rng & Units 4/1/2022   Calcium 8.7 - 10.2 mg/dL 9.5   Glucose 65 - 99 mg/dL 106(H)   BUN 6 - 24 mg/dL 20   Creatinine 0.76 - 1.27 mg/dL 0.82   Sodium 134 - 144 mmol/L 138   Potassium 3.5 - 5.2 mmol/L 4.9   Some recent data might be hidden       ALLERGY:  No Known Allergies     CURRENT MEDICATIONS:    Current Outpatient Medications:     digoxin (LANOXIN) 0.25 mg tablet, Take 1 Tablet by mouth daily. , Disp: 30 Tablet, Rfl: 1    empagliflozin (JARDIANCE) 10 mg tablet, Take 1 Tablet by mouth daily. , Disp: 30 Tablet, Rfl: 1    furosemide (Lasix) 20 mg tablet, take one tablet as needed as directed by [facility] for shortness of breath, noticable swelling, weight gain 3 lbs overnight or 5 or more lbs in one week (Patient taking differently: take one tablet as needed as directed by [facility] for shortness of breath, noticable swelling, weight gain 3 lbs overnight or 5 or more lbs in one week   Patient states that he is taking one tablet most every day), Disp: 90 Tablet, Rfl: 3    carvediloL (COREG) 12.5 mg tablet, Take 1.5 Tablets by mouth two (2) times daily (with meals). , Disp: 90 Tablet, Rfl: 2    spironolactone (ALDACTONE) 50 mg tablet, Take 50 mg by mouth daily. , Disp: , Rfl:     hydrALAZINE (APRESOLINE) 25 mg tablet, Take 1 Tablet by mouth two (2) times a day. Indications: chronic heart failure, Disp: 60 Tablet, Rfl: 0    ascorbic acid, vitamin C, (Vitamin C) 500 mg tablet, Take 250 mg by mouth daily. When patient remembers, Disp: , Rfl:     therapeutic multivitamin (THERAGRAN) tablet, Take 1 Tablet by mouth daily. , Disp: , Rfl:     Theresa Blount, NP  [address] 10 [facility] [phone]  Fax [phone]      PATIENT CARE TEAM:  Patient Care Team:  Christianna Meckel, MD as PCP - General (Internal Medicine Physician)  Christianna Meckel, MD as PCP - [address] [place] [address] Dr [facility]

## 2022-05-18 NOTE — PATIENT INSTRUCTIONS
Medication changes:    New prescription has been sent for you to take digoxin 0.25 mg every evening    Please take this to your pharmacy to notify them of the change in medications. Testing Ordered:    Lab slip provided for you to have digoxin level in one week    An order for an echocardiogram has been placed to be done mid-june. You will be receiving an automated call from Coordination of Care to schedule this test. If you are unavailable to receive the call or would like to contact coordination of care yourself you may contact [phone] to schedule. You will need to contact coordination of care yourself if you miss their calls as they will only make 3 attempts to reach you. We have reached out to Dr. Trina Putnam office to schedule a right heart catheterization for mid-June. They will be reaching out to you to schedule this. Other Recommendations:      Ensure your drinking an adequate amount of water with a goal of 6-8 eight ounce glasses (1.5-2 liters) of fluid daily. Your urine should be clear and light yellow straw colored. If your blood pressure begins to consistently run below 90/60 and/or you begin to experience dizziness or lightheadedness, please contact the [employer] at [phone]. Follow up in mid-June after your echocardiogram and right heart cath with Dr. Mireille Parker with [facility] [employer]      Please monitor your weights daily upon waking and after using the bathroom. Keep a written records of your weights and bring to your next appointment. If you have a weight gain of 3 or more pounds overnight OR 5 or more pounds in one week please contact our office. Thank you for allowing us the privilege of being a part of your healthcare team! Please do not hesitate to contact our office at [phone] with any questions or concerns.        Virtual Heart Failure [address] invites you to learn more about heart failure and to share your questions, ideas, and experiences with others. Each month, the Heart Failure Support Group features a new educational topic and a guest speaker, followed by an interactive discussion. Our Heart Failure Nurse Navigator will moderate each session. You will be able to participate by phone, tablet or computer through [address] [place] [address]. This support group takes place on the 3rd Thursday of each month from 6:00-7:30PM. All individuals living with heart failure and their caregivers are welcome to join. If you are interested in participating, please contact us at [email or web address] and you will be sent the link to join the [employer].

## 2022-06-04 LAB — DIGOXIN SERPL-MCNC: 1 NG/ML (ref 0.5–0.9)

## 2022-06-07 DIAGNOSIS — Z01.818 PREOP TESTING: Primary | ICD-10-CM

## 2022-06-11 LAB
BUN SERPL-MCNC: 12 MG/DL (ref 6–24)
BUN/CREAT SERPL: 14 (ref 9–20)
CALCIUM SERPL-MCNC: 9 MG/DL (ref 8.7–10.2)
CHLORIDE SERPL-SCNC: 98 MMOL/L (ref 96–106)
CO2 SERPL-SCNC: 26 MMOL/L (ref 20–29)
CREAT SERPL-MCNC: 0.85 MG/DL (ref 0.76–1.27)
EGFR: 103 ML/MIN/1.73
ERYTHROCYTE [DISTWIDTH] IN BLOOD BY AUTOMATED COUNT: 12.4 % (ref 11.6–15.4)
GLUCOSE SERPL-MCNC: 106 MG/DL (ref 65–99)
HCT VFR BLD AUTO: 45.2 % (ref 37.5–51)
HGB BLD-MCNC: 15.4 G/DL (ref 13–17.7)
MCH RBC QN AUTO: 32.2 PG (ref 26.6–33)
MCHC RBC AUTO-ENTMCNC: 34.1 G/DL (ref 31.5–35.7)
MCV RBC AUTO: 95 FL (ref 79–97)
PLATELET # BLD AUTO: 299 X10E3/UL (ref 150–450)
POTASSIUM SERPL-SCNC: 4.5 MMOL/L (ref 3.5–5.2)
RBC # BLD AUTO: 4.78 X10E6/UL (ref 4.14–5.8)
SODIUM SERPL-SCNC: 138 MMOL/L (ref 134–144)
WBC # BLD AUTO: 8.2 X10E3/UL (ref 3.4–10.8)

## 2022-06-13 RX ORDER — SODIUM CHLORIDE 9 MG/ML
75 INJECTION, SOLUTION INTRAVENOUS CONTINUOUS
Status: CANCELLED | OUTPATIENT
Start: 2022-06-17

## 2022-06-13 RX ORDER — SODIUM CHLORIDE 0.9 % (FLUSH) 0.9 %
5-40 SYRINGE (ML) INJECTION EVERY 8 HOURS
Status: CANCELLED | OUTPATIENT
Start: 2022-06-17

## 2022-06-13 RX ORDER — SODIUM CHLORIDE 0.9 % (FLUSH) 0.9 %
5-40 SYRINGE (ML) INJECTION AS NEEDED
Status: CANCELLED | OUTPATIENT
Start: 2022-06-17

## 2022-06-17 ENCOUNTER — HOSPITAL ENCOUNTER (OUTPATIENT)
Age: 57
Setting detail: OUTPATIENT SURGERY
Discharge: HOME | End: 2022-06-17
Attending: INTERNAL MEDICINE | Admitting: INTERNAL MEDICINE
Payer: COMMERCIAL

## 2022-06-17 VITALS
SYSTOLIC BLOOD PRESSURE: 116 MMHG | BODY MASS INDEX: 24.2 KG/M2 | WEIGHT: 154.2 LBS | RESPIRATION RATE: 21 BRPM | HEIGHT: 67 IN | OXYGEN SATURATION: 96 % | TEMPERATURE: 97.6 F | HEART RATE: 85 BPM | DIASTOLIC BLOOD PRESSURE: 87 MMHG

## 2022-06-17 DIAGNOSIS — I50.22 CHRONIC SYSTOLIC HEART FAILURE (HCC): ICD-10-CM

## 2022-06-17 PROCEDURE — C1894 INTRO/SHEATH, NON-LASER: HCPCS | Performed by: INTERNAL MEDICINE

## 2022-06-17 PROCEDURE — 74011000250 HC RX REV CODE- 250: Performed by: INTERNAL MEDICINE

## 2022-06-17 PROCEDURE — 74011250636 HC RX REV CODE- 250/636: Performed by: INTERNAL MEDICINE

## 2022-06-17 PROCEDURE — 99152 MOD SED SAME PHYS/QHP 5/>YRS: CPT

## 2022-06-17 PROCEDURE — 93451 RIGHT HEART CATH: CPT | Performed by: INTERNAL MEDICINE

## 2022-06-17 PROCEDURE — 2709999900 HC NON-CHARGEABLE SUPPLY: Performed by: INTERNAL MEDICINE

## 2022-06-17 PROCEDURE — C1751 CATH, INF, PER/CENT/MIDLINE: HCPCS | Performed by: INTERNAL MEDICINE

## 2022-06-17 PROCEDURE — 82810 BLOOD GASES O2 SAT ONLY: CPT | Performed by: INTERNAL MEDICINE

## 2022-06-17 PROCEDURE — 99153 MOD SED SAME PHYS/QHP EA: CPT

## 2022-06-17 RX ORDER — FENTANYL CITRATE 50 UG/ML
INJECTION, SOLUTION INTRAMUSCULAR; INTRAVENOUS AS NEEDED
Status: DISCONTINUED | OUTPATIENT
Start: 2022-06-17 | End: 2022-06-17 | Stop reason: HOSPADM

## 2022-06-17 RX ORDER — HEPARIN SODIUM 200 [USP'U]/100ML
INJECTION, SOLUTION INTRAVENOUS
Status: COMPLETED | OUTPATIENT
Start: 2022-06-17 | End: 2022-06-17

## 2022-06-17 RX ORDER — SODIUM CHLORIDE 0.9 % (FLUSH) 0.9 %
5-40 SYRINGE (ML) INJECTION EVERY 8 HOURS
Status: DISCONTINUED | OUTPATIENT
Start: 2022-06-17 | End: 2022-06-17 | Stop reason: HOSPADM

## 2022-06-17 RX ORDER — SODIUM CHLORIDE 0.9 % (FLUSH) 0.9 %
5-40 SYRINGE (ML) INJECTION AS NEEDED
Status: DISCONTINUED | OUTPATIENT
Start: 2022-06-17 | End: 2022-06-17 | Stop reason: HOSPADM

## 2022-06-17 RX ORDER — LIDOCAINE HYDROCHLORIDE 10 MG/ML
INJECTION INFILTRATION; PERINEURAL AS NEEDED
Status: DISCONTINUED | OUTPATIENT
Start: 2022-06-17 | End: 2022-06-17 | Stop reason: HOSPADM

## 2022-06-17 RX ORDER — SODIUM CHLORIDE 9 MG/ML
75 INJECTION, SOLUTION INTRAVENOUS CONTINUOUS
Status: DISCONTINUED | OUTPATIENT
Start: 2022-06-17 | End: 2022-06-17 | Stop reason: HOSPADM

## 2022-06-17 RX ADMIN — SODIUM CHLORIDE 75 ML/HR: 9 INJECTION, SOLUTION INTRAVENOUS at 07:54

## 2022-06-17 NOTE — Clinical Note
TRANSFER - OUT REPORT:     Verbal report given to: MICHAEL (at bedside). Report consisted of patient's Situation, Background, Assessment and   Recommendations(SBAR). Opportunity for questions and clarification was provided. Patient transported with a Registered Nurse. Patient transported to: recovery.

## 2022-06-17 NOTE — ROUTINE PROCESS
7:20 AM  Patient arrived. ID and allergies verified verbally with patient. Pt voices understanding of procedure to be performed. Consent obtained. Pt prepped for procedure. 9:04 AM  TRANSFER - OUT REPORT:    Verbal report given to René Walker (name) on [place] Button  being transferred to cath lab(unit) for ordered procedure       Report consisted of patients Situation, Background, Assessment and   Recommendations(SBAR). Information from the following report(s) SBAR and Kardex was reviewed with the receiving nurse. Lines:   Peripheral IV 06/17/22 Anterior; Left Wrist (Active)   Site Assessment Clean, dry, & intact 06/17/22 0752   Phlebitis Assessment 0 06/17/22 0752   Infiltration Assessment 0 06/17/22 0752   Dressing Status Clean, dry, & intact 06/17/22 0752   Dressing Type Tape;Transparent 06/17/22 0752   Hub Color/Line Status Blue 06/17/22 0752   Action Taken Open ports on tubing capped 06/17/22 0752   Alcohol Cap Used Yes 06/17/22 0752       Peripheral IV 06/17/22 Right Antecubital (Active)   Site Assessment Clean, dry, & intact 06/17/22 0755   Phlebitis Assessment 0 06/17/22 0755   Infiltration Assessment 0 06/17/22 0755   Dressing Status Clean, dry, & intact 06/17/22 0755   Dressing Type Tape;Transparent 06/17/22 0755   Hub Color/Line Status Pink 06/17/22 0755   Action Taken Open ports on tubing capped 06/17/22 0755   Alcohol Cap Used Yes 06/17/22 0755        Opportunity for questions and clarification was provided. Patient transported with:   Tech    10:07 AM  TRANSFER - IN REPORT:    Verbal report received from Taryn(name) on Yunier Button  being received from cath lab(unit) for routine post - op      Report consisted of patients Situation, Background, Assessment and   Recommendations(SBAR). Information from the following report(s) SBAR, Kardex and Procedure Summary was reviewed with the receiving nurse. Opportunity for questions and clarification was provided.       Assessment completed upon patients arrival to unit and care assumed. 10:20 AM  Discharge instructions reviewed with patient and family. Voiced understanding. Patient given copy of discharge instructions to take home. 10:25 AM  Pt sat on side of bed then ambulated around unit and to restroom. Voided. Returned to chair. Right brachial site dressing dry and intact. No bleeding or hematoma. Pt voices no complaints. 10:37 AM  Pt discharged via ambulation with wife. Personal belongings with patient upon discharge.

## 2022-06-17 NOTE — Clinical Note
TRANSFER - IN REPORT:     Verbal report received from: [facility]. Report consisted of patient's Situation, Background, Assessment and   Recommendations(SBAR). Opportunity for questions and clarification was provided. Assessment completed upon patient's arrival to unit and care assumed. Patient transported with a Cardiac Cath Tech / Patient Care Tech.

## 2022-06-17 NOTE — DISCHARGE INSTRUCTIONS
Patient Education        Right Heart Catheterization: What to Expect at Home  Your Recovery     The right side of the heart receives blood from the body and pumps it to the lungs. The blood picks up oxygen in the lungs. A right heart catheterization (also called pulmonary artery catheterization) tests the blood pressure and oxygen levels in your lungs and heart. It also checks to see how well your heart is pumping. Your doctor put a thin, flexible tube (catheter) into a blood vessel in your neck, groin, or arm. During the test, the doctor moved the catheter through the blood vessel into your heart. A small balloon on the tip of the catheter helped guide it into the artery that carries blood to your lungs (pulmonary artery). If your doctor used an X-ray to see where to move the catheter, you also had dye injected into your blood vessel and heart. You may have swelling, bruising, or a small lump around the site where the catheter went into your body. You can do light activities around the house. But don't do anything strenuous until your doctor says it is okay. This lets the catheter site heal.  This care sheet gives you a general idea about how long it will take for you to recover. But each person recovers at a different pace. Follow the steps below to get better as quickly as possible. How can you care for yourself at home? Activity    · If the doctor gave you a sedative:  ? For 24 hours, don't do anything that requires attention to detail, such as going to work, making important decisions, or signing any legal documents. It takes time for the medicine's effects to completely wear off.  ? For your safety, do not drive or operate any machinery that could be dangerous. Wait until the medicine wears off and you can think clearly and react easily.     · Do not do strenuous exercise and do not lift, pull, or push anything heavy until your doctor says it is okay. This may be for a couple of days.  This lets the catheter site heal. You can walk around the house and do light activity, such as cooking. Diet    · If you had dye injected, drink plenty of fluids to help your body flush out the dye. If you have kidney, heart, or liver disease and have to limit fluids, talk with your doctor before you increase the amount of fluids you drink.     · You can eat your normal diet. If your stomach is upset, try bland, low-fat foods like plain rice, broiled chicken, toast, and yogurt. Medicines    · Your doctor will tell you if and when you can restart your medicines. You will also be given instructions about taking any new medicines.     · If you take aspirin or some other blood thinner, be sure to talk to your doctor. Your doctor will tell you if and when to start taking this medicine again. Make sure that you understand exactly what your doctor wants you to do.     · Call your doctor if you think you are having a problem with your medicine. Care of the catheter site    · For 1 or 2 days, keep the bandage over the spot where the catheter was inserted. The bandage probably will fall off in this time.     · Put ice or a cold pack on the area for 10 to 20 minutes at a time to help with soreness or swelling. Put a thin cloth between the ice and your skin.     · You may shower 24 to 48 hours after the procedure, if your doctor okays it. Pat the incision dry.     · Do not soak the catheter site until it is healed. Don't take a bath for 1 week, or until your doctor tells you it is okay.     · Watch for bleeding from the site. A small amount of blood (up to the size of a quarter) on the bandage can be normal.     · If you are bleeding, lie down and press on the area for 15 minutes to try to make it stop. If the bleeding doesn't stop, call your doctor or seek immediate medical care. Follow-up care is a key part of your treatment and safety. Be sure to make and go to all appointments, and call your doctor if you are having problems. It's also a good idea to know your test results and keep a list of the medicines you take. When should you call for help? Call 911  anytime you think you may need emergency care. For example, call if:    · You passed out (lost consciousness).     · You have symptoms of a heart attack. These may include:  ? Chest pain or pressure, or a strange feeling in the chest.  ? Sweating. ? Shortness of breath. ? Nausea or vomiting. ? Pain, pressure, or a strange feeling in the back, neck, jaw, or upper belly or in one or both shoulders or arms. ? Lightheadedness or sudden weakness. ? A fast or irregular heartbeat. After you call 911, the  may tell you to chew 1 adult-strength or 2 to 4 low-dose aspirin. Wait for an ambulance. Do not try to drive yourself. Call your doctor now or seek immediate medical care if:    · You are bleeding from the area where the catheter was put in.     · You have a fast-growing, painful lump at the catheter site.     · You have symptoms of infection, such as:  ? Increased pain, swelling, warmth, or redness. ? Red streaks leading from the area. ? Pus draining from the area. ? A fever.     · Your leg, arm, or hand is painful, looks blue, or feels cold, numb, or tingly. Where can you learn more? Go to http://www.gray.com/  Enter R076 in the search box to learn more about \"Right Heart Catheterization: What to Expect at Home. \"  Current as of: January 10, 2022               Content Version: 13.2  © 8300-5204 [employer] Care instructions adapted under license by [employer] (which disclaims liability or warranty for this information). If you have questions about a medical condition or this instruction, always ask your healthcare professional. [address] any warranty or liability for your use of this information.

## 2022-06-17 NOTE — INTERVAL H&P NOTE
Update History & Physical    The Patient's History and Physical of 5/18/22, was reviewed with the patient and I examined the patient. There was no change. Plan:  The risk, benefits, expected outcome, and alternative to the recommended procedure have been discussed with the patient. Patient understands and wants to proceed with the procedure.     Electronically signed by Husam Gregory MD on 6/17/2022 at 9:00 AM

## 2022-06-17 NOTE — PROCEDURES
BRIEF PROCEDURE NOTE    Date of Procedure: 6/17/2022   Preoperative Diagnosis: Pulmonary HTN  Postoperative Diagnosis: Pulm HTN    Procedure: [address]  Interventional Cardiologist: Janel Matute MD  Assistant : none  Anesthesia: local + IV sedation  I administered moderate sedation throughout this procedure. An independent trained observer pushed medications at my direction, and monitored the patients level of consciousness and physiological status throughout. Estimated Blood Loss: Minimal    Access: R Brachial vein access - 7 F sheath    Findings:       RHC findings:    RAPm=  10     mmHg  RVSP=  53   mmHg  PAPm=  33      mmHg  PCWPm= 25   mmHg  CO=  3.15         L/min ( Slim)  ; 4.3 ( TD)  CI= 1.74                                     2.3    Specimens Removed : None    Devices implanted : None    Complications: None    Closure Device: None        See full cath note.     Complications: none    Janel Matute MD

## 2022-06-23 ENCOUNTER — OFFICE VISIT (OUTPATIENT)
Dept: CARDIOLOGY CLINIC | Age: 57
End: 2022-06-23
Payer: COMMERCIAL

## 2022-06-23 VITALS
OXYGEN SATURATION: 94 % | TEMPERATURE: 98 F | BODY MASS INDEX: 23.9 KG/M2 | SYSTOLIC BLOOD PRESSURE: 110 MMHG | RESPIRATION RATE: 16 BRPM | HEART RATE: 90 BPM | DIASTOLIC BLOOD PRESSURE: 82 MMHG | WEIGHT: 152.6 LBS

## 2022-06-23 DIAGNOSIS — I50.22 SYSTOLIC CHF, CHRONIC (HCC): Primary | ICD-10-CM

## 2022-06-23 PROCEDURE — 99215 OFFICE O/P EST HI 40 MIN: CPT | Performed by: INTERNAL MEDICINE

## 2022-06-23 NOTE — PROGRESS NOTES
Chief Complaint   Patient presents with    CHF     f/u after RHC       1. Have you been to the ER, urgent care clinic since your last visit? Hospitalized since your last visit? No    2. Have you seen or consulted any other health care providers outside of the [address] [place] [address] since your last visit? Include any pap smears or colon screening.  No    Visit Vitals  /82 (BP 1 Location: Left arm, BP Patient Position: Sitting)   Pulse 90   Temp 98 °F (36.7 °C) (Oral)   Resp 16   Wt 152 lb 9.6 oz (69.2 kg)   SpO2 94%   BMI 23.90 kg/m²

## 2022-06-23 NOTE — PATIENT INSTRUCTIONS
Medication changes:    No medication changes     Goal weight 149 to 152 lbs     Please take this to your pharmacy to notify them of the change in medications. Testing Ordered:    None    Other Recommendations: Follow up with pulmonary provider in [facility] your drinking an adequate amount of water with a goal of 6-8 eight ounce glasses (1.5-2 liters) of fluid daily. Your urine should be clear and light yellow straw colored. If your blood pressure begins to consistently run below 90/60 and/or you begin to experience dizziness or lightheadedness, please contact the [address] at [phone]. Follow up December  with [address]      Please monitor your weights daily upon waking and after using the bathroom. Keep a written records of your weights and bring to your next appointment. If you have a weight gain of 3 or more pounds overnight OR 5 or more pounds in one week please contact our office. Thank you for allowing us the privilege of being a part of your healthcare team! Please do not hesitate to contact our office at [phone] with any questions or concerns. Virtual Heart Failure [address] invites you to learn more about heart failure and to share your questions, ideas, and experiences with others. Each month, the Heart Failure Support Group features a new educational topic and a guest speaker, followed by an interactive discussion. Our Heart Failure Nurse Navigator will moderate each session. You will be able to participate by phone, tablet or computer through [employer]. This support group takes place on the 3rd Thursday of each month from 6:00-7:30PM. All individuals living with heart failure and their caregivers are welcome to join. If you are interested in participating, please contact us at [email or web address] and you will be sent the link to join the [employer].

## 2022-06-24 ENCOUNTER — TELEPHONE (OUTPATIENT)
Dept: CARDIOLOGY CLINIC | Age: 57
End: 2022-06-24

## 2022-06-24 NOTE — TELEPHONE ENCOUNTER
Peer to peer completed by Byron Leon NP for echocardiogram, Reference number [id number] on this date, approved, Jackeline Barillas # [id number], approved until August 8, 2022. I called [employer] auth line and gave them approval information. They state echo needs to be rescheduled. I called patient, gave him phone number, he will call now to schedule.

## 2022-06-28 NOTE — PROGRESS NOTES
[facility] in [address] [facility], [facility] Note    Patient name: Natasha Davidson  Patient : 1965  Patient MRN: [id number]  Date of service: 22    Primary care Shara Parekr MD  Primary general cardiologist: Dr. 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 and associated defects including scoliosis; however, without renal anomalies, congenital heart disease, and deafness)  · Patient underwent extensive orthopedic surgeries now c/b severe restrictive lung physiology with hypoventilation and hypercapnia syndrome; BiPAP therapy was recommended by pulmonary but declined by insurance; ongoing appeals - important: BiPAP therapy may have a significant impact not only on improved ventilation and QOL, but also on cardiac recovery and RV protection  · Patient was admitted for acute on chronic HF with volume overload, now improved/resolved, cardiac MRI (2021) revealed LVEF 40-45% and mod-severe RV failure; most likely etiology is a combination of chronic restrictive lung physiology +/- possible hypoxia/GABY +/- underlying TTN inherited cardiomypathy  · RHC during recent hospitalization for acute heart failure (2021) revealed severe volume overload LV>RV with PVR 3.2 and  low resting cardiac index 1.8 indicating group 2 pulmonary hypertension; since then patient diuresed (weight from 171-175lbs to 157lbs) with improved symptoms and repeat RHC showed RA 10, PA 46/21/33, wedge 25 and Slim CI 1.7, TD 2.3 - diastolic heart failure and group 2 pulmonary hypertension  · Patient was referred to Dr. Adriana Benz with [facility] at [facility] due to TNN variant.     PLAN:  Continue coreg 18.75mg twice daily  Continue hydralazine 25mg twice daily  Patient not taking ACEi/ARB/ARNi; borderline high K+ and hemodynamics > we discussed possibility of change to ARB/ARNi with valtessa; patient prefers to continue with current regimen  Continue spironolactone 50mg daily  Continue jardiance 10mg daily  Continue lasix 20mg daily  Continue digoxin 0.25mg QHS, follow levels with next labwork  Not on allopurinol or uloric, check uric acid level  Not taking aspirin  Not taking statins, check lipid profile, CPK and LFT  Echo declined by insurance company  BiPAP was denied by [employer]; appeal pending - we are happy to help with appeal, pt not interested at this time  Reinforced low salt diet  Reinforced fluid restriction to 6 x 8oz glasses per day  Follow-up with primary cardiologist in [facility] q6mos alternating with us; quarterly visits with cardiologist  Obtain medical records from pulmonary  Referral to Dr. Ludmila Sierra with [facility] at [facility]- scheduled in July  Offered two family members free genetic testing at our clinic  Return to [address] in 3 months with MD/NP     IMPRESSION:  Chronic combined systolic and diastolic heart failure  · Stage C, NYHA class II symptoms  · Non-ischemic cardiomyopathy, LVEF 40-45%, 50% (by echo 5/2021) and 55-60% (by echo 12/2021)  · Right ventricular dysfunction, moderate to severe (by cMRI 12/2021)  · Severe pulmonary hypertension, group 2 by [address] 6/2022  · PFO with small R to L shunt  · RHC with elevated wedge and Slim CI 1.7 c/b diastolic heart failure  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  · Restrictive lung disease, severe  · Scoliosis  · Hypoventilation (hypercapnia, hypoxia)  · 6MW desaturates to 87%  Cardiac risk factors  · HTN  · HL  · High risk GABY (negative sleep study per patient)  · Polycythemia  Klippel-Feil syndrome (congenital fusion of variable numbers of cervical vertebrae and associated defects including scoliosis) without renal anomalies, congenital heart disease, and deafness)  Family h/o club foot, cleft palate  Family h/o early cardiomyopathy (brother  of HF in 45s)  · Genetic testing for cardiomyopathy; TTN variant     CARDIAC IMAGING:  Echo (21)    Left Ventricle: Left ventricle size is normal. Normal wall thickness. The EF by visual approximation is 55 - 60%.   Interatrial Septum: Poor quality bubble study but there does appear to be bubbles crossing over from right to left .   Right Atrium: apppears mildly dilated.   Technical qualifiers: Echo study was technically difficult due to patient's body habitus.     Normal EF with a poor quality bubble study.  There does appear bubbles passed from the right or to the left but again was an adequate study. Jessica Bee is any concern of a ASD or VSD would recommend a cardiac MRI.     Echo (21)  · LV: Calculated LVEF is 50%. Normal cavity size and wall thickness. Low normal systolic function. · RV: Borderline low systolic function.     ADAN (21)  · ADAN aborted after patient had respiratory arrest with light sedation. [facility] notes for details.     EKG (21) NSR 91bpm, LAFB, QRS 86ms, QTc 462ms, non-specific ST-T changes     RHC (21)  Jay-Tawny catheter inserted via right internal jugular vein. PA pressure = 70/45 mmHg. PA Sat = 60.6 %. High RA pressure = 25/19 mmHg. High RA mean = 19 mmHg. High RA Sat = 76.2 %. Mid RA Sat = 68.2 %. Low RA mean = 24 mmHg. Low RA Sat = 61.9 %. RV pressure = 80/35 mmHg. RV Sat = 60.8 %. Wedge pressure = 39 mmHg. Wedge SAT = 58.1 %. AO pressure = 135/100 mmHg. AO mean pressure = 114 mmHg. AO Sat = 89 %. TDCO = 3.6 L/min. Slim CO = 5.27 L/min. IVC pressure = 42/33 mmHg. IVC mean pressure = 38 mmHg. IVC Sat = 49.5 %. SVC Sat = 69.5 %.     Cardiac MRI 21 -   1. There is no atrial septal defect or ventricular septal defect. There is a  small patent foramen ovale with small right-to-left shunt.  Significant bowing of  the interatrial septum towards the left side indicate high right atrial  pressure. All pulmonary veins visualized draining into the left atrium. There is  no sinus venosus defect. There is no persistent left SVC. 2. Dilated right ventricle with moderate to severe right ventricular systolic  dysfunction. Moderate to severe global hypokinesis. 3. Normal left ventricular systolic function with mild systolic dysfunction. Mild global hypokinesis. LVEF 40-45%. 4. Markedly dilated right atrium. 5. Mild 1+ tricuspid regurgitation. 6. Severe scoliosis with loss of lung volume. 7. Trace to small right-sided pleural effusion        HISTORY OF PRESENT ILLNESS:  I had the pleasure of seeing Александр Randolph in [facility] at [facility] in [place] via virtual video encounter.     Omer Double a 49 y. o. male with complicated history noted above referred to [facility] for further recommendations. Patient has a cardiologist in [address]:  Today, patient presents for routine clinic visit.      he reports doing well.  he is able to do activity as he wants and denies significant limitation from HF symptoms. Patient can perform home activities without problem. he denies other cardiac symptoms such as chest pain or leg pain with walking.      Patient denies symptoms of volume overload or leg edema. Reports a normal appetite. he denies abdominal bloating, nausea or early satiety. Patient's weight remained stable. he denies orthopnea, PND or nocturia. Denies irregular heart rate or palpitations. No presyncope or syncope.      he is compliant with fluid restriction and taking medications as prescribed. Patient manages his own medications. REVIEW OF SYSTEMS:  General: Denies fever, night sweats.   Ear, nose and throat: Denies difficulty hearing, sinus problems, runny nose, post-nasal drip, ringing in ears, mouth sores, loose teeth, ear pain, nosebleeds, sore throate, facial pain or numbess  Cardiovascular: see above in the interval history  Respiratory: Denies cough, wheezing, sputum production, hemoptysis. Gastrointestinal: Denies heartburn, constipation, diarrhea, abdominal pain, nausea, vomiting, difficulty swallowing, blood in stool  Kidney and bladder: Denies painful urination, frequent urination, urgency  Musculoskeletal: Denies joint pain, muscle weakness  Skin and hair: Denies change in existing skin lesions, hair loss or increase, breast changes    PHYSICAL EXAM:  Visit Vitals  /82 (BP 1 Location: Left arm, BP Patient Position: Sitting)   Pulse 90   Temp 98 °F (36.7 °C) (Oral)   Resp 16   Wt 152 lb 9.6 oz (69.2 kg)   SpO2 94%   BMI 23.90 kg/m²     General: Patient is well developed, well-nourished in no acute distress  HEENT: Normocephalic and atraumatic. No scleral icterus. Pupils are equal, round and reactive to light and accomodation. No conjunctival injection. Oropharynx is clear. Neck: Supple. No evidence of thyroid enlargements or lymphadenopathy. JVD: Cannot be appreciated   Lungs: Breath sounds are equal and clear bilaterally. No wheezes, rhonchi, or rales. Heart: Regular rate and rhythm with normal S1 and S2. No murmurs, gallops or rubs. Abdomen: Soft, no mass or tenderness. No organomegaly or hernia. Bowel sounds present. Genitourinary and rectal: deferred  Extremities: No cyanosis, clubbing, or edema. Neurologic: No focal sensory or motor deficits are noted. Grossly intact. Psychiatric: Awake, alert an doriented x 3. Appropriate mood and affect. Skin: Warm, dry and well perfused. No lesions, nodules or rashes are noted.     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    Pulmonary hypertension ([address]     Restrictive lung disease     Right ventricular dysfunction     Scoliosis        PAST SURGICAL HISTORY:  Past Surgical History:   Procedure Laterality Date    HX ORTHOPAEDIC         FAMILY HISTORY:  Family History   Problem Relation Age of Onset    Heart Disease Brother        SOCIAL HISTORY:  Social History     Socioeconomic History    Marital status:    Tobacco Use    Smoking status: Never Smoker    Smokeless tobacco: Never Used   Substance and Sexual Activity    Alcohol use: Yes     Comment: socially     Drug use: Never       LABORATORY RESULTS:  Labs Latest Ref Rng & Units 6/10/2022 5/13/2022   WBC 3.4 - 10.8 x10E3/uL 8.2 7.9   RBC 4.14 - 5.80 x10E6/uL 4.78 4.97   Hemoglobin 13.0 - 17.7 g/dL 15.4 16.0   Hematocrit 37.5 - 51.0 % 45.2 47.2   MCV 79 - 97 fL 95 95   Platelets 191 - 751 B15M6/ 307   Albumin 3.8 - 4.9 g/dL - 4.6   Calcium 8.7 - 10.2 mg/dL 9.0 9.5   Glucose 65 - 99 mg/dL 106(H) 104(H)   BUN 6 - 24 mg/dL 12 17   Creatinine 0.76 - 1.27 mg/dL 0.85 0.88   Sodium 134 - 144 mmol/L 138 139   Potassium 3.5 - 5.2 mmol/L 4.5 5.2   TSH 0.450 - 4.500 uIU/mL - 2.100   Some recent data might be hidden       ALLERGY:  No Known Allergies     CURRENT MEDICATIONS:    Current Outpatient Medications:     Jardiance 10 mg tablet, TAKE 1 TABLET BY MOUTH EVERY DAY, Disp: 30 Tablet, Rfl: 1    digoxin (LANOXIN) 0.25 mg tablet, Take 1 Tablet by mouth daily. , Disp: 30 Tablet, Rfl: 1    furosemide (Lasix) 20 mg tablet, take one tablet as needed as directed by [facility] for shortness of breath, noticable swelling, weight gain 3 lbs overnight or 5 or more lbs in one week (Patient taking differently: take one tablet as needed as directed by [facility] for shortness of breath, noticable swelling, weight gain 3 lbs overnight or 5 or more lbs in one week   Patient states that he is taking one tablet most every day), Disp: 90 Tablet, Rfl: 3    carvediloL (COREG) 12.5 mg tablet, Take 1.5 Tablets by mouth two (2) times daily (with meals). , Disp: 90 Tablet, Rfl: 2    spironolactone (ALDACTONE) 50 mg tablet, Take 50 mg by mouth daily. , Disp: , Rfl:     hydrALAZINE (APRESOLINE) 25 mg tablet, Take 1 Tablet by mouth two (2) times a day. Indications: chronic heart failure, Disp: 60 Tablet, Rfl: 0    ascorbic acid, vitamin C, (Vitamin C) 500 mg tablet, Take 250 mg by mouth daily. When patient remembers, Disp: , Rfl:     Thank you for your referral and allowing me to participate in this patient's care.     Joseluis Rehman MD PhD  [address] [place] [address], [address]  Phone: [phone]  Fax: [phone]    PATIENT CARE TEAM:  Patient Care Team:  Linda Manriquez MD as PCP - General (Internal Medicine Physician)  Linda Manriquez MD as PCP - [address] [place] [address] Provider  Radha Cardona MD (Cardiovascular Disease Physician)     Total visit time: 45 minutes  (> 50% spent face-to-face counseling)

## 2022-07-16 DIAGNOSIS — I50.9 ACUTE ON CHRONIC HEART FAILURE, UNSPECIFIED HEART FAILURE TYPE (HCC): ICD-10-CM

## 2022-07-18 RX ORDER — DIGOXIN 250 MCG
TABLET ORAL
Qty: 30 TABLET | Refills: 1 | Status: SHIPPED | OUTPATIENT
Start: 2022-07-18 | End: 2022-09-23

## 2022-07-27 ENCOUNTER — HOSPITAL ENCOUNTER (OUTPATIENT)
Dept: NON INVASIVE DIAGNOSTICS | Age: 57
Discharge: HOME | End: 2022-07-27
Attending: NURSE PRACTITIONER
Payer: COMMERCIAL

## 2022-07-27 VITALS
HEIGHT: 67 IN | SYSTOLIC BLOOD PRESSURE: 142 MMHG | DIASTOLIC BLOOD PRESSURE: 97 MMHG | WEIGHT: 152 LBS | BODY MASS INDEX: 23.86 KG/M2

## 2022-07-27 DIAGNOSIS — R06.02 SHORTNESS OF BREATH: ICD-10-CM

## 2022-07-27 DIAGNOSIS — I50.9 ACUTE ON CHRONIC HEART FAILURE, UNSPECIFIED HEART FAILURE TYPE (HCC): ICD-10-CM

## 2022-07-27 LAB
ECHO AV AREA PEAK VELOCITY: 2.2 CM2
ECHO AV AREA VTI: 2.1 CM2
ECHO AV AREA/BSA PEAK VELOCITY: 1.2 CM2/M2
ECHO AV AREA/BSA VTI: 1.2 CM2/M2
ECHO AV MEAN GRADIENT: 3 MMHG
ECHO AV MEAN VELOCITY: 0.7 M/S
ECHO AV PEAK GRADIENT: 5 MMHG
ECHO AV PEAK VELOCITY: 1.2 M/S
ECHO AV VELOCITY RATIO: 0.67
ECHO AV VTI: 17.6 CM
ECHO LA DIAMETER INDEX: 1.56 CM/M2
ECHO LA DIAMETER: 2.8 CM
ECHO LA VOL 2C: 34 ML (ref 18–58)
ECHO LA VOL 4C: 40 ML (ref 18–58)
ECHO LA VOL BP: 36 ML (ref 18–58)
ECHO LA VOL/BSA BIPLANE: 20 ML/M2 (ref 16–34)
ECHO LA VOLUME AREA LENGTH: 39 ML
ECHO LA VOLUME INDEX A2C: 19 ML/M2 (ref 16–34)
ECHO LA VOLUME INDEX A4C: 22 ML/M2 (ref 16–34)
ECHO LA VOLUME INDEX AREA LENGTH: 22 ML/M2 (ref 16–34)
ECHO LV E' LATERAL VELOCITY: 13 CM/S
ECHO LV E' SEPTAL VELOCITY: 8 CM/S
ECHO LV EDV A2C: 62 ML
ECHO LV EDV A4C: 41 ML
ECHO LV EDV BP: 52 ML (ref 67–155)
ECHO LV EDV INDEX A4C: 23 ML/M2
ECHO LV EDV INDEX BP: 29 ML/M2
ECHO LV EDV NDEX A2C: 34 ML/M2
ECHO LV EJECTION FRACTION A2C: 52 %
ECHO LV EJECTION FRACTION A4C: 38 %
ECHO LV EJECTION FRACTION BIPLANE: 47 % (ref 55–100)
ECHO LV ESV A2C: 30 ML
ECHO LV ESV A4C: 25 ML
ECHO LV ESV BP: 28 ML (ref 22–58)
ECHO LV ESV INDEX A2C: 17 ML/M2
ECHO LV ESV INDEX A4C: 14 ML/M2
ECHO LV ESV INDEX BP: 16 ML/M2
ECHO LV FRACTIONAL SHORTENING: 22 % (ref 28–44)
ECHO LV INTERNAL DIMENSION DIASTOLE INDEX: 2.06 CM/M2
ECHO LV INTERNAL DIMENSION DIASTOLIC: 3.7 CM (ref 4.2–5.9)
ECHO LV INTERNAL DIMENSION SYSTOLIC INDEX: 1.61 CM/M2
ECHO LV INTERNAL DIMENSION SYSTOLIC: 2.9 CM
ECHO LV IVSD: 1.1 CM (ref 0.6–1)
ECHO LV MASS 2D: 129.3 G (ref 88–224)
ECHO LV MASS INDEX 2D: 71.9 G/M2 (ref 49–115)
ECHO LV POSTERIOR WALL DIASTOLIC: 1.1 CM (ref 0.6–1)
ECHO LV RELATIVE WALL THICKNESS RATIO: 0.59
ECHO LVOT AREA: 3.1 CM2
ECHO LVOT AV VTI INDEX: 0.66
ECHO LVOT DIAM: 2 CM
ECHO LVOT MEAN GRADIENT: 1 MMHG
ECHO LVOT PEAK GRADIENT: 2 MMHG
ECHO LVOT PEAK VELOCITY: 0.8 M/S
ECHO LVOT STROKE VOLUME INDEX: 20.4 ML/M2
ECHO LVOT SV: 36.7 ML
ECHO LVOT VTI: 11.7 CM
ECHO MV A VELOCITY: 0.7 M/S
ECHO MV E DECELERATION TIME (DT): 56.9 MS
ECHO MV E VELOCITY: 0.51 M/S
ECHO MV E/A RATIO: 0.73
ECHO MV E/E' LATERAL: 3.92
ECHO MV E/E' RATIO (AVERAGED): 5.15
ECHO MV E/E' SEPTAL: 6.38
ECHO PV MAX VELOCITY: 0.9 M/S
ECHO PV PEAK GRADIENT: 3 MMHG
ECHO RV INTERNAL DIMENSION: 3.7 CM
ECHO RV TAPSE: 1.9 CM (ref 1.7–?)
ECHO RVOT PEAK GRADIENT: 2 MMHG
ECHO RVOT PEAK VELOCITY: 0.8 M/S

## 2022-07-27 PROCEDURE — 93306 TTE W/DOPPLER COMPLETE: CPT

## 2022-07-27 PROCEDURE — 93306 TTE W/DOPPLER COMPLETE: CPT | Performed by: STUDENT IN AN ORGANIZED HEALTH CARE EDUCATION/TRAINING PROGRAM

## 2022-09-23 DIAGNOSIS — I50.9 ACUTE ON CHRONIC HEART FAILURE, UNSPECIFIED HEART FAILURE TYPE (HCC): ICD-10-CM

## 2022-09-23 RX ORDER — DIGOXIN 250 MCG
TABLET ORAL
Qty: 30 TABLET | Refills: 1 | Status: SHIPPED | OUTPATIENT
Start: 2022-09-23 | End: 2022-10-11 | Stop reason: SDUPTHER

## 2022-10-11 DIAGNOSIS — I50.9 ACUTE ON CHRONIC HEART FAILURE, UNSPECIFIED HEART FAILURE TYPE (HCC): ICD-10-CM

## 2022-10-11 RX ORDER — DIGOXIN 250 MCG
0.25 TABLET ORAL DAILY
Qty: 90 TABLET | Refills: 1 | Status: SHIPPED | OUTPATIENT
Start: 2022-10-11

## 2022-10-11 RX ORDER — SPIRONOLACTONE 50 MG/1
50 TABLET, FILM COATED ORAL DAILY
Qty: 90 TABLET | Refills: 1 | Status: SHIPPED | OUTPATIENT
Start: 2022-10-11

## 2022-10-11 RX ORDER — CARVEDILOL 12.5 MG/1
18.75 TABLET ORAL 2 TIMES DAILY WITH MEALS
Qty: 270 TABLET | Refills: 1 | Status: SHIPPED | OUTPATIENT
Start: 2022-10-11

## 2022-10-11 NOTE — TELEPHONE ENCOUNTER
Requested Prescriptions     Signed Prescriptions Disp Refills    carvediloL (COREG) 12.5 mg tablet 270 Tablet 1     Sig: Take 1.5 Tablets by mouth two (2) times daily (with meals). Authorizing Provider: Sarwat Or     Ordering User: Amy Aguirre    empagliflozin (Jardiance) 10 mg tablet 90 Tablet 1     Sig: Take 1 Tablet by mouth daily. Authorizing Provider: Sarwat Or     Ordering User: Amy Aguirre    spironolactone (ALDACTONE) 50 mg tablet 90 Tablet 1     Sig: Take 1 Tablet by mouth daily. Authorizing Provider: [place] Or     Ordering User: Amy Aguirre    digoxin (LANOXIN) 0.25 mg tablet 90 Tablet 1     Sig: Take 1 Tablet by mouth daily.      Authorizing Provider: Sarwat Or     Ordering User: Amy Aguirre

## 2023-01-27 ENCOUNTER — TELEPHONE (OUTPATIENT)
Dept: CARDIOLOGY CLINIC | Age: 58
End: 2023-01-27

## 2023-01-30 ENCOUNTER — OFFICE VISIT (OUTPATIENT)
Dept: CARDIOLOGY CLINIC | Age: 58
End: 2023-01-30
Payer: COMMERCIAL

## 2023-01-30 VITALS
WEIGHT: 156.6 LBS | SYSTOLIC BLOOD PRESSURE: 108 MMHG | HEIGHT: 67 IN | RESPIRATION RATE: 16 BRPM | DIASTOLIC BLOOD PRESSURE: 80 MMHG | TEMPERATURE: 98.3 F | OXYGEN SATURATION: 97 % | BODY MASS INDEX: 24.58 KG/M2 | HEART RATE: 79 BPM

## 2023-01-30 DIAGNOSIS — I50.42 CHRONIC COMBINED SYSTOLIC AND DIASTOLIC CONGESTIVE HEART FAILURE (HCC): Primary | ICD-10-CM

## 2023-01-30 DIAGNOSIS — E55.9 VITAMIN D DEFICIENCY: ICD-10-CM

## 2023-01-30 PROCEDURE — 99214 OFFICE O/P EST MOD 30 MIN: CPT | Performed by: NURSE PRACTITIONER

## 2023-01-30 RX ORDER — SACUBITRIL AND VALSARTAN 24; 26 MG/1; MG/1
1 TABLET, FILM COATED ORAL 2 TIMES DAILY
Qty: 60 TABLET | Refills: 2 | Status: SHIPPED | OUTPATIENT
Start: 2023-01-30

## 2023-01-30 RX ORDER — ERGOCALCIFEROL 1.25 MG/1
50000 CAPSULE ORAL
Qty: 12 CAPSULE | Refills: 0 | Status: SHIPPED | OUTPATIENT
Start: 2023-01-30

## 2023-01-30 RX ORDER — SPIRONOLACTONE 25 MG/1
25 TABLET ORAL DAILY
Qty: 30 TABLET | Refills: 2 | Status: SHIPPED | OUTPATIENT
Start: 2023-01-30

## 2023-01-30 NOTE — PROGRESS NOTES
[facility] in [place], [facility] Note    Patient name: Teofilo Baez  Patient : 1965  Patient MRN: [id number]  Date of service: 23    Primary care physician: Didi Garzon MD  Primary general cardiologist: Dr. 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 and associated defects including scoliosis; however, without renal anomalies, congenital heart disease, and deafness)  Patient underwent extensive orthopedic surgeries now c/b severe restrictive lung physiology with hypoventilation and hypercapnia syndrome; BiPAP therapy was recommended by pulmonary but declined by insurance; ongoing appeals - important: BiPAP therapy may have a significant impact not only on improved ventilation and QOL, but also on cardiac recovery and RV protection  Patient was admitted for acute on chronic HF with volume overload, now improved/resolved, cardiac MRI (2021) revealed LVEF 40-45% and mod-severe RV failure; most likely etiology is a combination of chronic restrictive lung physiology +/- possible hypoxia/GABY +/- underlying TTN inherited cardiomypathy  RHC during recent hospitalization for acute heart failure (2021) revealed severe volume overload LV>RV with PVR 3.2 and  low resting cardiac index 1.8 indicating group 2 pulmonary hypertension; since then patient diuresed (weight from 171-175lbs to 157lbs) with improved symptoms and repeat RHC showed RA 10, PA 46/21/33, wedge 25 and Slim CI 1.7, TD 2.3 - diastolic heart failure and group 2 pulmonary hypertension  Patient was referred to Dr. Kimberly Kan with [facility] at [facility] due to TNN variant. Saw in Oct with virtual visit. Recovery in EF to 50-55%.   HFimp EF.  NYHA class I-II     INTERVAL HISTORY:  -covid in August  -VSS  -labs from PCP earlier this month reviewed. Some under media, but patient also pulled up on his phone. Lipid panel elevated. K 4.2  -ECHO 07/22 with EF 50-55%  -weight up 4lbs since last visit, but patient states weights have been steady at 150lb at home  -Mr. Rudi Hart is here for routine HF follow up. He is feeling well. No SOB or HERRERA. Recent fatigue from a cold, but otherwise energy has been good most days. Can ambulate up stairs without stopping. Rare leg swelling. No orthopnea, PND, palpitations, chest pain. Had some dizziness and presyncope on Friday, but he had been having diarrhea that day and he felt better after he had something to eat and drink. Took a recent vacation with  a lot of walking and felt great other than some high heart rates at times when he over exerted himself. PLAN:  Continue coreg 18.75mg twice daily  Stop hydralazine to allow enough BP for entresto  Patient open to starting entresto. Recent K 4.2.  will start lowest dose of entresto 24mg/26mg and decrease spironolactone to 25mg daily. Recheck labs in 2 weeks to check on K and HF labs   Continue jardiance 10mg daily  Continue lasix 20mg daily PRN (taking every other day )  Continue digoxin 0.25mg QHS, check level  Not on allopurinol or uloric, check uric acid level  Not taking aspirin  Previous Vit D level very low. Start Vitamin D supplementation   Not taking statins. Lipids elevated.   Patient not willing to start a statin at this time  Echo to be obtained prior to next appointment   BiPAP was denied by insurance company; appeal pending - we are happy to help with appeal, pt not interested at this time  Reinforced low salt diet  Reinforced fluid restriction to 6 x 8oz glasses per day  Follow-up with primary cardiologist in [address] [place] [address] alternating with us; quarterly visits with cardiologist  Referral to Dr. Cuate Easton with [facility] at [address] [place] [address] Patient states he saw in  and Dr. La Chacko did not have much to add different. Return to [facility] Clinic in 6 months with MD/NP. Labs in 2 weeks. ECHO prior to next visit. Patient advised to call with any BP or dehydration issues. 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 in   Restrictive lung disease, severe  Scoliosis  Hypoventilation (hypercapnia, hypoxia)  6MW desaturates to 87%  Cardiac risk factors  HTN  HL  High risk GABY (negative sleep study per patient)  Polycythemia  Klippel-Feil syndrome (congenital fusion of variable numbers of cervical vertebrae and associated defects including scoliosis) without renal anomalies, congenital heart disease, and deafness)  Family h/o club foot, cleft palate  Family h/o early cardiomyopathy (brother  of HF in 45s)  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 Normal wall thickness. The EF by visual approximation is 55 - 60%. Interatrial Septum: Poor quality bubble study but there does appear to be bubbles crossing over from right to left . Right Atrium: apppears mildly dilated. Technical qualifiers: Echo study was technically difficult due to patient's body habitus. Normal EF with a poor quality bubble study. There does appear bubbles passed from the right or to the left but again was an adequate study.   There is any concern of a ASD or VSD would recommend a cardiac MRI. Echo (5/24/21)  LV: Calculated LVEF is 50%. Normal cavity size and wall thickness. Low normal systolic function. RV: Borderline low systolic function. ADAN (12/28/21)  ADAN aborted after patient had respiratory arrest with light sedation. See hospital notes for details. EKG (12/22/21) NSR 91bpm, LAFB, QRS 86ms, QTc 462ms, non-specific ST-T changes     RHC (12/27/21)  Stanfield-Tawny catheter inserted via right internal jugular vein. PA pressure = 70/45 mmHg. PA Sat = 60.6 %. High RA pressure = 25/19 mmHg. High RA mean = 19 mmHg. High RA Sat = 76.2 %. Mid RA Sat = 68.2 %. Low RA mean = 24 mmHg. Low RA Sat = 61.9 %. RV pressure = 80/35 mmHg. RV Sat = 60.8 %. Wedge pressure = 39 mmHg. Wedge SAT = 58.1 %. AO pressure = 135/100 mmHg. AO mean pressure = 114 mmHg. AO Sat = 89 %. TDCO = 3.6 L/min. Slim CO = 5.27 L/min. IVC pressure = 42/33 mmHg. IVC mean pressure = 38 mmHg. IVC Sat = 49.5 %. SVC Sat = 69.5 %. Cardiac MRI 12/29/21 -   1. There is no atrial septal defect or ventricular septal defect. There is a  small patent foramen ovale with small right-to-left shunt. Significant bowing of  the interatrial septum towards the left side indicate high right atrial  pressure. All pulmonary veins visualized draining into the left atrium. There is  no sinus venosus defect. There is no persistent left SVC. 2. Dilated right ventricle with moderate to severe right ventricular systolic  dysfunction. Moderate to severe global hypokinesis. 3. Normal left ventricular systolic function with mild systolic dysfunction. Mild global hypokinesis. LVEF 40-45%. 4. Markedly dilated right atrium. 5. Mild 1+ tricuspid regurgitation. 6. Severe scoliosis with loss of lung volume.   7. 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 and associated defects including scoliosis; however, without renal anomalies, congenital heart disease, and deafness)  Patient underwent extensive orthopedic surgeries now c/b severe restrictive lung physiology with hypoventilation and hypercapnia syndrome; BiPAP therapy was recommended by pulmonary but declined by insurance; ongoing appeals - important: BiPAP therapy may have a significant impact not only on improved ventilation and QOL, but also on cardiac recovery and RV protection  Patient was admitted for acute on chronic HF with volume overload, now improved/resolved, cardiac MRI (12/2021) revealed LVEF 40-45% and mod-severe RV failure; most likely etiology is a combination of chronic restrictive lung physiology +/- possible hypoxia/GABY +/- underlying TTN inherited cardiomypathy  RHC during recent hospitalization for acute heart failure (12/2021) revealed severe volume overload LV>RV with PVR 3.2 and  low resting cardiac index 1.8 indicating group 2 pulmonary hypertension; since then patient diuresed (weight from 171-175lbs to 157lbs) with improved symptoms and repeat RHC showed RA 10, PA 46/21/33, wedge 25 and Slim CI 1.7, TD 2.3 - diastolic heart failure and group 2 pulmonary hypertension  Patient was referred to Dr. Breana Francisco with [facility] at [facility] due to TNN variant. \"          REVIEW OF SYSTEMS:  Review of Systems   Constitutional:  Positive for malaise/fatigue. Negative for chills and fever. With recent cold   HENT:  Negative for ear pain and sore throat. Eyes:  Negative for blurred vision and double vision. Respiratory:  Negative for cough, shortness of breath and wheezing. Cardiovascular:  Positive for leg swelling. Negative for chest pain, palpitations, orthopnea and PND. Some today since hasn't had lasix   Gastrointestinal:  Positive for diarrhea. Negative for abdominal pain, constipation, nausea and vomiting. Friday   Genitourinary:  Negative for dysuria, frequency and urgency. Musculoskeletal:  Negative for falls. Skin:  Negative for rash. Neurological:  Positive for dizziness. Negative for loss of consciousness and headaches. Friday when having diarrhea and hadn't eaten   Endo/Heme/Allergies:  Does not bruise/bleed easily. Psychiatric/Behavioral:  The patient does not have insomnia. PHYSICAL EXAM:  Visit Vitals  /80 (BP 1 Location: Left upper arm, BP Patient Position: Sitting)   Pulse 79   Temp 98.3 °F (36.8 °C) (Oral)   Resp 16   Ht 5' 7\" (1.702 m)   Wt 156 lb 9.6 oz (71 kg)   SpO2 97%   BMI 24.53 kg/m²       Physical Assessment:   General Appearance: alert, cooperative, adult  male sitting in chair in NAD; appears stated age  Eyes: sclera anicteric  Mouth/Throat: moist mucous membranes; oral pharynx clear  Neck: supple; no JVD   Pulmonary:  clear to auscultation bilaterally; good effort;   Cardiovascular: regular rate and rhythm; no murmur, click, rub, or gallop  Abdomen: soft, non-tender, non-distended; bowel sounds normal  Musculoskeletal: no swelling or deformity; moves all extremities  Extremities: trace to 1+ edema BLE; palpable distal pulses   Skin: warm and dry  Neuro: grossly normal  Psych: normal mood and affect given the setting      PAST MEDICAL HISTORY:  Past Medical History:   Diagnosis Date    Heart failure (HCC)     Hypertension     Klippel-Feil syndrome     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    Pulmonary hypertension (HCC)     Restrictive lung disease     Right ventricular dysfunction     Scoliosis        PAST SURGICAL HISTORY:  Past Surgical History:   Procedure Laterality Date    HX ORTHOPAEDIC         FAMILY HISTORY:  Family History   Problem Relation Age of Onset    Heart Disease Brother        SOCIAL HISTORY:  Social History     Socioeconomic History    Marital status:    Tobacco Use    Smoking status: Never    Smokeless tobacco: Never   Substance and Sexual Activity    Alcohol use:  Yes Comment: socially     Drug use: Never       LABORATORY RESULTS:  No flowsheet data found. ALLERGY:  No Known Allergies     CURRENT MEDICATIONS:    Current Outpatient Medications:     digoxin (LANOXIN) 0.25 mg tablet, TAKE 1 TABLET BY MOUTH EVERY DAY, Disp: 30 Tablet, Rfl: 0    carvediloL (COREG) 12.5 mg tablet, Take 1.5 Tablets by mouth two (2) times daily (with meals). , Disp: 270 Tablet, Rfl: 1    empagliflozin (Jardiance) 10 mg tablet, Take 1 Tablet by mouth daily. , Disp: 90 Tablet, Rfl: 1    spironolactone (ALDACTONE) 50 mg tablet, Take 1 Tablet by mouth daily. , Disp: 90 Tablet, Rfl: 1    furosemide (Lasix) 20 mg tablet, take one tablet as needed as directed by [facility] for shortness of breath, noticable swelling, weight gain 3 lbs overnight or 5 or more lbs in one week (Patient taking differently: take one tablet as needed as directed by [facility] for shortness of breath, noticable swelling, weight gain 3 lbs overnight or 5 or more lbs in one week   Patient states that he is taking one tablet approx every other day), Disp: 90 Tablet, Rfl: 3    hydrALAZINE (APRESOLINE) 25 mg tablet, Take 1 Tablet by mouth two (2) times a day. Indications: chronic heart failure, Disp: 60 Tablet, Rfl: 0    Thank you for your referral and allowing me to participate in this patient's care. Jennifer Collins NP  DNP, RN, AGACNP-[address], [address]  Phone: [phone]      PATIENT CARE TEAM:  Patient Care Team:  Wilfred Saldana MD as PCP - General (Internal Medicine Physician)  Wilfred Saldana MD as PCP - [facility] Provider  Maria L Stockton MD (Cardiovascular Disease Physician)     On this date 1/30/2023, I have spent a total time of  35 minutes personally reviewing new vitals, test results, notes from recent visits, face to face encounter/physical exam of patient with counseling, writing orders, performing medical decision making, and documenting.

## 2023-01-30 NOTE — PATIENT INSTRUCTIONS
Medication changes:    STOP hydralazine     START Entresto 24/26 twice a day- you will take one tablet by mouth twice a day     START ergocalciferol 50,000 units once a week- you will take one tablet by mouth once every 7 days    DECREASE spironolactone to 25mg once a day- you will take one by mouth daily     - of your current dose you may take a half a 50mg tablet daily to equal this same dose until you can  your new prescription    Please take this to your pharmacy to notify them of the change in medications. Testing Ordered:    Please present to a local lab velia in 2 weeks to have labs drawn. You will be notified of any abnormal results that requires a change in medication regimen. Other Recommendations: An order for echo  has been placed to be done in the soonest available You will be receiving an automated call from Coordination of Care to schedule this test. If you are unavailable to receive the call or would like to contact coordination of care yourself you may contact [phone] to schedule. You will need to contact coordination of care yourself if you miss their calls as they will only make 3 attempts to reach you. Ensure your drinking an adequate amount of water with a goal of 6-8 eight ounce glasses (1.5-2 liters) of fluid daily. Your urine should be clear and light yellow straw colored. If your blood pressure begins to consistently run below 90/60 and/or you begin to experience dizziness or lightheadedness, please contact the [address] at [phone]. Follow up 6 months with NP  with [facility]      Please monitor your weights daily upon waking and after using the bathroom. Keep a written records of your weights and bring to your next appointment. If you have a weight gain of 3 or more pounds overnight OR 5 or more pounds in one week please contact our office.        Thank you for allowing us the privilege of being a part of your healthcare team! Please do not hesitate to contact our office at [phone] with any questions or concerns. Virtual Heart Failure [address] invites you to learn more about heart failure and to share your questions, ideas, and experiences with others. Each month, the Heart Failure Support Group features a new educational topic and a guest speaker, followed by an interactive discussion. Our Heart Failure Nurse Navigator will moderate each session. You will be able to participate by phone, tablet or computer through [address] [place] [address]. This support group takes place on the 3rd Thursday of each month from 6:00-7:30PM. All individuals living with heart failure and their caregivers are welcome to join. If you are interested in participating, please contact us at [email or web address] and you will be sent the link to join the [employer].

## 2023-01-30 NOTE — LETTER
1/30/2023    Patient: Bella Barry   YOB: 1965   Date of Visit: 1/30/2023     Elias Escobar MD  [address] [address]  Via Fax: [phone]    Dear Elias Escobar MD,      Thank you for referring Mr. Mima Rodriguez to [facility] for evaluation. My notes for this consultation are attached. If you have questions, please do not hesitate to call me. I look forward to following your patient along with you.       Sincerely,    Dominic Lara NP

## 2023-05-18 RX ORDER — ERGOCALCIFEROL 1.25 MG/1
CAPSULE ORAL
Qty: 4 CAPSULE | Refills: 2 | OUTPATIENT
Start: 2023-05-18

## 2023-05-18 NOTE — TELEPHONE ENCOUNTER
I called patient, advised him per MOE Isidro:  He needs to obtain labwork, regardless, and then we can determine if he needs more Vit D.     CMP, Mg, pBNP, Vit D, iron panel, ferritin, uric acid, digoxin. To obtain this week or next week, ideally. He states he will have PCP address Vitamin D. He also just had labs drawn by PCP-I will request lab results from PCP. He also states he is still waiting for entresto to be approved from insurance, so he is hesitant to have anything else done until he starts entresto.

## 2023-05-30 RX ORDER — DIGOXIN 250 MCG
TABLET ORAL
Qty: 90 TABLET | Refills: 0 | Status: SHIPPED | OUTPATIENT
Start: 2023-05-30

## 2023-06-01 ENCOUNTER — TELEPHONE (OUTPATIENT)
Age: 58
End: 2023-06-01

## 2023-06-01 NOTE — TELEPHONE ENCOUNTER
Called patient listed [facility] pharmacy notified them that he has been approved for entresto by insurance. She ran the script and states that it will be a 200 dollar copay. Notified her that I will provide patient with option to sign up for copay card. Called patient using two patient identifiers. Advised patient on above information. Notified patient that prescription is at his listed [facility] pharmacy. Provided patient with option to set up copay card he states that he will have his wife look into it and set up if needed. Educated patient to take blood pressures in the morning before medications and two hours after medications and to call one week after starting medication to report blood pressure logs. Patient stated understanding and had no further questions.

## 2023-06-30 ENCOUNTER — PATIENT MESSAGE (OUTPATIENT)
Age: 58
End: 2023-06-30

## 2023-06-30 DIAGNOSIS — E61.1 IRON DEFICIENCY: ICD-10-CM

## 2023-06-30 DIAGNOSIS — Z79.899 ENCOUNTER FOR MONITORING DIGOXIN THERAPY: ICD-10-CM

## 2023-06-30 DIAGNOSIS — Z51.81 ENCOUNTER FOR MONITORING DIURETIC THERAPY: ICD-10-CM

## 2023-06-30 DIAGNOSIS — I50.42 CHRONIC COMBINED SYSTOLIC (CONGESTIVE) AND DIASTOLIC (CONGESTIVE) HEART FAILURE (HCC): Primary | ICD-10-CM

## 2023-06-30 DIAGNOSIS — Z51.81 ENCOUNTER FOR MONITORING DIGOXIN THERAPY: ICD-10-CM

## 2023-06-30 DIAGNOSIS — Z79.899 ENCOUNTER FOR MONITORING DIURETIC THERAPY: ICD-10-CM

## 2023-07-08 LAB
ALBUMIN SERPL-MCNC: 4.2 G/DL (ref 3.8–4.9)
ALBUMIN/GLOB SERPL: 1.7 {RATIO} (ref 1.2–2.2)
ALP SERPL-CCNC: 61 IU/L (ref 44–121)
ALT SERPL-CCNC: 23 IU/L (ref 0–44)
AST SERPL-CCNC: 24 IU/L (ref 0–40)
BILIRUB SERPL-MCNC: 0.8 MG/DL (ref 0–1.2)
BUN SERPL-MCNC: 19 MG/DL (ref 6–24)
BUN/CREAT SERPL: 20 (ref 9–20)
CALCIUM SERPL-MCNC: 8.8 MG/DL (ref 8.7–10.2)
CHLORIDE SERPL-SCNC: 96 MMOL/L (ref 96–106)
CO2 SERPL-SCNC: 22 MMOL/L (ref 20–29)
CREAT SERPL-MCNC: 0.94 MG/DL (ref 0.76–1.27)
DIGOXIN SERPL-MCNC: 1.1 NG/ML (ref 0.5–0.9)
EGFRCR SERPLBLD CKD-EPI 2021: 95 ML/MIN/1.73
FERRITIN SERPL-MCNC: 202 NG/ML (ref 30–400)
GLOBULIN SER CALC-MCNC: 2.5 G/DL (ref 1.5–4.5)
GLUCOSE SERPL-MCNC: 116 MG/DL (ref 70–99)
IRON SATN MFR SERPL: 54 % (ref 15–55)
IRON SERPL-MCNC: 167 UG/DL (ref 38–169)
MAGNESIUM SERPL-MCNC: 2 MG/DL (ref 1.6–2.3)
POTASSIUM SERPL-SCNC: 4.4 MMOL/L (ref 3.5–5.2)
PROT SERPL-MCNC: 6.7 G/DL (ref 6–8.5)
SODIUM SERPL-SCNC: 138 MMOL/L (ref 134–144)
TIBC SERPL-MCNC: 310 UG/DL (ref 250–450)
UIBC SERPL-MCNC: 143 UG/DL (ref 111–343)
URATE SERPL-MCNC: 6.8 MG/DL (ref 3.8–8.4)

## 2023-07-10 LAB — BNP SERPL-MCNC: 48.5 PG/ML (ref 0–100)

## 2023-07-18 ENCOUNTER — TELEPHONE (OUTPATIENT)
Age: 58
End: 2023-07-18

## 2023-07-19 ENCOUNTER — OFFICE VISIT (OUTPATIENT)
Age: 58
End: 2023-07-19
Payer: COMMERCIAL

## 2023-07-19 VITALS
OXYGEN SATURATION: 96 % | HEIGHT: 67 IN | SYSTOLIC BLOOD PRESSURE: 88 MMHG | TEMPERATURE: 98.7 F | RESPIRATION RATE: 18 BRPM | HEART RATE: 80 BPM | DIASTOLIC BLOOD PRESSURE: 64 MMHG | BODY MASS INDEX: 22.91 KG/M2 | WEIGHT: 146 LBS

## 2023-07-19 DIAGNOSIS — I50.42 CHRONIC COMBINED SYSTOLIC (CONGESTIVE) AND DIASTOLIC (CONGESTIVE) HEART FAILURE (HCC): ICD-10-CM

## 2023-07-19 DIAGNOSIS — Z79.899 ENCOUNTER FOR MONITORING DIGOXIN THERAPY: Primary | ICD-10-CM

## 2023-07-19 DIAGNOSIS — Z51.81 ENCOUNTER FOR MONITORING DIGOXIN THERAPY: Primary | ICD-10-CM

## 2023-07-19 DIAGNOSIS — E55.9 VITAMIN D DEFICIENCY, UNSPECIFIED: ICD-10-CM

## 2023-07-19 PROCEDURE — G8427 DOCREV CUR MEDS BY ELIG CLIN: HCPCS | Performed by: NURSE PRACTITIONER

## 2023-07-19 PROCEDURE — 3017F COLORECTAL CA SCREEN DOC REV: CPT | Performed by: NURSE PRACTITIONER

## 2023-07-19 PROCEDURE — G8420 CALC BMI NORM PARAMETERS: HCPCS | Performed by: NURSE PRACTITIONER

## 2023-07-19 PROCEDURE — 99214 OFFICE O/P EST MOD 30 MIN: CPT | Performed by: NURSE PRACTITIONER

## 2023-07-19 PROCEDURE — 4004F PT TOBACCO SCREEN RCVD TLK: CPT | Performed by: NURSE PRACTITIONER

## 2023-07-19 ASSESSMENT — PATIENT HEALTH QUESTIONNAIRE - PHQ9
2. FEELING DOWN, DEPRESSED OR HOPELESS: 0
1. LITTLE INTEREST OR PLEASURE IN DOING THINGS: 0
SUM OF ALL RESPONSES TO PHQ QUESTIONS 1-9: 0
SUM OF ALL RESPONSES TO PHQ9 QUESTIONS 1 & 2: 0
SUM OF ALL RESPONSES TO PHQ QUESTIONS 1-9: 0

## 2023-07-19 ASSESSMENT — ENCOUNTER SYMPTOMS
NAUSEA: 0
VOMITING: 0
ABDOMINAL DISTENTION: 0
SHORTNESS OF BREATH: 0
COUGH: 0

## 2023-07-19 NOTE — PATIENT INSTRUCTIONS
Medication changes:    No medication changes     Please take this to your pharmacy to notify them of the change in medications. Testing Ordered:    Please also remember to have you dig level drawn next week. Please have labs drawn in November. You will be notified of any abnormal results that requires a change in medication regimen. Other Recommendations:      Ensure your drinking an adequate amount of water with a goal of 6-8 eight ounce glasses (1.5-2 liters) of fluid daily. Your urine should be clear and light yellow straw colored. If your blood pressure begins to consistently run below 90/60 and/or you begin to experience dizziness or lightheadedness, please contact the [facility] at [phone]. Follow up 1 year with MD with [facility]      Please monitor your weights daily upon waking and after using the bathroom. Keep a written records of your weights and bring to your next appointment. If you have a weight gain of 3 or more pounds overnight OR 5 or more pounds in one week please contact our office. Thank you for allowing us the privilege of being a part of your healthcare team! Please do not hesitate to contact our office at [phone] with any questions or concerns.

## 2023-10-02 ENCOUNTER — TELEPHONE (OUTPATIENT)
Age: 58
End: 2023-10-02

## 2023-10-02 RX ORDER — DIGOXIN 250 MCG
TABLET ORAL
Qty: 90 TABLET | Refills: 1 | Status: SHIPPED | OUTPATIENT
Start: 2023-10-02

## 2023-10-02 NOTE — TELEPHONE ENCOUNTER
Prior auth completed for Jardiance via covermymeds, approved. I notified pharmacy, they will order medication and it will be here tomorrow. They will notify patient.

## 2023-10-26 RX ORDER — SPIRONOLACTONE 25 MG/1
25 TABLET ORAL DAILY
Qty: 90 TABLET | Refills: 2 | Status: SHIPPED | OUTPATIENT
Start: 2023-10-26

## 2023-10-26 NOTE — TELEPHONE ENCOUNTER
Requested Prescriptions     Signed Prescriptions Disp Refills    spironolactone (ALDACTONE) 25 MG tablet 90 tablet 2     Sig: Take 1 tablet by mouth daily     Authorizing Provider: Elo Daly     Ordering User: Shabana Estrella        Last office visit 7/19/2023    Next appointment on tracker for 1 year follow up.

## 2023-11-06 RX ORDER — EMPAGLIFLOZIN 10 MG/1
10 TABLET, FILM COATED ORAL DAILY
Qty: 90 TABLET | Refills: 1 | Status: SHIPPED | OUTPATIENT
Start: 2023-11-06

## 2023-11-06 NOTE — TELEPHONE ENCOUNTER
Requested Prescriptions     Pending Prescriptions Disp Refills   • JARDIANCE 10 MG tablet [Pharmacy Med Name: JARDIANCE 10 MG TABLET] 90 tablet 1     Sig: TAKE 1 TABLET BY MOUTH EVERY DAY

## 2024-01-29 RX ORDER — DIGOXIN 250 MCG
TABLET ORAL
Qty: 90 TABLET | Refills: 0 | Status: SHIPPED | OUTPATIENT
Start: 2024-01-29

## 2024-01-29 NOTE — TELEPHONE ENCOUNTER
Requested Prescriptions     Pending Prescriptions Disp Refills    digoxin (LANOXIN) 250 MCG tablet [Pharmacy Med Name: DIGOXIN 250 MCG TABLET] 90 tablet 0     Sig: TAKE 1 TABLET BY MOUTH EVERY DAY

## 2024-02-28 ENCOUNTER — HOSPITAL ENCOUNTER (OUTPATIENT)
Facility: HOSPITAL | Age: 59
Discharge: HOME | End: 2024-03-02
Payer: COMMERCIAL

## 2024-02-28 DIAGNOSIS — M54.17 LUMBOSACRAL NEURITIS: ICD-10-CM

## 2024-02-28 PROCEDURE — 72131 CT LUMBAR SPINE W/O DYE: CPT

## 2024-05-13 RX ORDER — DIGOXIN 250 MCG
TABLET ORAL
Qty: 90 TABLET | Refills: 0 | OUTPATIENT
Start: 2024-05-13

## 2024-05-16 LAB
ALBUMIN SERPL-MCNC: 4 G/DL (ref 3.8–4.9)
ALBUMIN/GLOB SERPL: 1.9 {RATIO} (ref 1.2–2.2)
ALP SERPL-CCNC: 61 IU/L (ref 44–121)
ALT SERPL-CCNC: 21 IU/L (ref 0–44)
AST SERPL-CCNC: 16 IU/L (ref 0–40)
BILIRUB SERPL-MCNC: 0.8 MG/DL (ref 0–1.2)
BUN SERPL-MCNC: 15 MG/DL (ref 6–24)
BUN/CREAT SERPL: 17 (ref 9–20)
CALCIUM SERPL-MCNC: 9.2 MG/DL (ref 8.7–10.2)
CHLORIDE SERPL-SCNC: 96 MMOL/L (ref 96–106)
CO2 SERPL-SCNC: 27 MMOL/L (ref 20–29)
CREAT SERPL-MCNC: 0.87 MG/DL (ref 0.76–1.27)
EGFRCR SERPLBLD CKD-EPI 2021: 101 ML/MIN/1.73
GLOBULIN SER CALC-MCNC: 2.1 G/DL (ref 1.5–4.5)
GLUCOSE SERPL-MCNC: 94 MG/DL (ref 70–99)
POTASSIUM SERPL-SCNC: 5.1 MMOL/L (ref 3.5–5.2)
PROT SERPL-MCNC: 6.1 G/DL (ref 6–8.5)
SODIUM SERPL-SCNC: 140 MMOL/L (ref 134–144)

## 2024-05-17 ENCOUNTER — TELEPHONE (OUTPATIENT)
Age: 59
End: 2024-05-17

## 2024-05-17 DIAGNOSIS — Z51.81 ENCOUNTER FOR MONITORING DIGOXIN THERAPY: Primary | ICD-10-CM

## 2024-05-17 DIAGNOSIS — Z79.899 ENCOUNTER FOR MONITORING DIGOXIN THERAPY: Primary | ICD-10-CM

## 2024-05-17 LAB
25(OH)D3+25(OH)D2 SERPL-MCNC: 22.7 NG/ML (ref 30–100)
BNP SERPL-MCNC: 90.1 PG/ML (ref 0–100)
DIGOXIN SERPL-MCNC: 1.2 NG/ML (ref 0.5–0.9)
MAGNESIUM SERPL-MCNC: 2.1 MG/DL (ref 1.6–2.3)
URATE SERPL-MCNC: 6.6 MG/DL (ref 3.8–8.4)

## 2024-05-17 NOTE — TELEPHONE ENCOUNTER
----- Message from ARMINDA Leos NP sent at 5/17/2024  9:41 AM EDT -----  Please call Fuentes Simons to discuss their abnormal lab results. Vit D is low, would start 2000units daily.  His dig is 1.2, please have him hold his dose tonight, resume tomorrow.  Repeat Dig level in 3-4 weeks    @0950 - spoke with Fuentes Simons regarding the above results and recommendations. He declined having a Vit.D prescription sent, saying he would  OTC. He also said that he has not been taking Vit. D for a few months. He was instructed to to hold digoxin tonight and restart tomorrow. He was told to have dig level drawn. He prefers to got [facility] and requested his own copies of the orders. He stated no further concerns.

## 2024-06-17 DIAGNOSIS — Z79.899 ENCOUNTER FOR MONITORING DIGOXIN THERAPY: ICD-10-CM

## 2024-06-17 DIAGNOSIS — Z51.81 ENCOUNTER FOR MONITORING DIGOXIN THERAPY: ICD-10-CM

## 2024-06-28 NOTE — TELEPHONE ENCOUNTER
Requested Prescriptions     Pending Prescriptions Disp Refills    empagliflozin (JARDIANCE) 10 MG tablet 90 tablet 1     Sig: Take 1 tablet by mouth daily      Last appt July 2023  Next appt July 2024

## 2024-07-02 LAB — DIGOXIN SERPL-MCNC: 0.6 NG/ML (ref 0.5–0.9)

## 2024-07-12 ENCOUNTER — TELEPHONE (OUTPATIENT)
Age: 59
End: 2024-07-12

## 2024-07-12 NOTE — TELEPHONE ENCOUNTER
Telephone Call RE:  Appointment reminder     Outcome:     [x] Patient confirmed appointment   [] Patient rescheduled appointment for    [] Unable to reach  [] Left message              [x] Other: Parking

## 2024-07-17 NOTE — PROGRESS NOTES
[facility]  [place], [place]  Heart Failure Outpatient Clinic Note    Patient name: Fuentes Simons  Patient : 1965  Patient MRN: [id number]  Date of service: 24    Primary care physician: Kennedy Bowling MD  Primary cardiologist: Petr Solis MD  Primary F cardiologist: Jefferson Herrera MD      CHIEF COMPLAINT:  Follow up for chronic diastolic heart failure    ASSESSMENT:  Fuentes Simons is a 59 y.o. male with a history of chronic diastolic and right heart failure, stage C. On optimal GDMT, limited by blood pressure. Has pathogenic TTN gene mutation. Annual ECG and Echo surveillance.     PLAN:  Heart failure/Cardiomyopathy:  NYHA II  Continue current medical therapy for heart failure:  ACE/ARB/ARNi: Continue Entresto 24-26 mg BID  MRA: Continue Spironolactone 25 mg daily  SGLT2 inhibitor: Continue Jardiance 10 mg daily  Diuretic: Continue Lasix 20 mg daily  Continue Digoxin 0.25 mg daily for RV dysfunction  Plans for Echo with primary cardiologist in 2024  Reinforced low salt diet  Reinforced fluid restriction to 6 x 8oz glasses per day  Recommended 30 minutes of aerobic exercise 5 days weekly  Labs: Labs 2024 reviewed. Renal function, liver function, electrolytes normal. Vitamin D low and patient now on Vitamin D supplement    Hypertension:  Low BP, asymptomatic. No medication changes made today    HLD:  Continue Crestor 20 mg daily    Needs Advanced Care Plan   Recommend flu, covid and pneumonia vaccinations  Follow-up with primary cardiologist  Return to AHF Clinic in 1 year      HISTORY OF PRESENT ILLNESS:  I had the pleasure of seeing Fuentes Simons in [facility] at [facility] in [place].    Fuentes Simons is a 59 y.o. 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

## 2024-07-18 ENCOUNTER — OFFICE VISIT (OUTPATIENT)
Age: 59
End: 2024-07-18
Payer: COMMERCIAL

## 2024-07-18 VITALS
HEIGHT: 67 IN | OXYGEN SATURATION: 97 % | TEMPERATURE: 97.3 F | DIASTOLIC BLOOD PRESSURE: 70 MMHG | WEIGHT: 149 LBS | HEART RATE: 72 BPM | RESPIRATION RATE: 20 BRPM | SYSTOLIC BLOOD PRESSURE: 94 MMHG | BODY MASS INDEX: 23.39 KG/M2

## 2024-07-18 DIAGNOSIS — I50.812 CHRONIC RIGHT HEART FAILURE (HCC): ICD-10-CM

## 2024-07-18 DIAGNOSIS — E78.2 MIXED HYPERLIPIDEMIA: ICD-10-CM

## 2024-07-18 DIAGNOSIS — I10 ESSENTIAL HYPERTENSION: ICD-10-CM

## 2024-07-18 DIAGNOSIS — I50.32 CHRONIC DIASTOLIC HEART FAILURE (HCC): Primary | ICD-10-CM

## 2024-07-18 PROBLEM — I27.20 PULMONARY HYPERTENSION (HCC): Status: ACTIVE | Noted: 2021-12-30

## 2024-07-18 PROBLEM — J96.01 ACUTE RESPIRATORY FAILURE WITH HYPOXIA (HCC): Status: RESOLVED | Noted: 2021-12-22 | Resolved: 2024-07-18

## 2024-07-18 PROCEDURE — 99214 OFFICE O/P EST MOD 30 MIN: CPT | Performed by: INTERNAL MEDICINE

## 2024-07-18 PROCEDURE — 3078F DIAST BP <80 MM HG: CPT | Performed by: INTERNAL MEDICINE

## 2024-07-18 PROCEDURE — 3074F SYST BP LT 130 MM HG: CPT | Performed by: INTERNAL MEDICINE

## 2024-07-18 RX ORDER — ROSUVASTATIN CALCIUM 20 MG/1
20 TABLET, COATED ORAL DAILY
COMMUNITY

## 2024-07-18 ASSESSMENT — PATIENT HEALTH QUESTIONNAIRE - PHQ9
SUM OF ALL RESPONSES TO PHQ QUESTIONS 1-9: 0
SUM OF ALL RESPONSES TO PHQ9 QUESTIONS 1 & 2: 0
SUM OF ALL RESPONSES TO PHQ QUESTIONS 1-9: 0
2. FEELING DOWN, DEPRESSED OR HOPELESS: NOT AT ALL
SUM OF ALL RESPONSES TO PHQ QUESTIONS 1-9: 0
SUM OF ALL RESPONSES TO PHQ QUESTIONS 1-9: 0
1. LITTLE INTEREST OR PLEASURE IN DOING THINGS: NOT AT ALL

## 2024-07-18 NOTE — PATIENT INSTRUCTIONS
Medication changes:    No medication         Please take this to your pharmacy to notify them of the change in medications.     Testing Ordered:        Referrals:      Other Recommendations:      - Record blood pressure and heart rate daily before medication and two hours after medication  - Record weight daily upon waking/after using the bathroom.   - Keep a written records of your weights and blood pressure and bring to your next appointment.   - Call the clinic at if you have a weight gain of 3 or more pounds overnight OR 5 or more pounds in one week, new/worsened shortness of breath or swelling, or if your blood pressure begins to consistently run below 90/60 and/or you begin to experience dizziness or lightheadedness. Our office phone number [phone] option 2.  - Ensure you are drinking an adequate amount of water with a goal of 6-8 eight ounce glasses (1.5-2 liters) of fluid daily. Your urine should be clear and light yellow straw colored.       Follow up 1 year with MD with [facility]    Thank you for allowing us the privilege of being a part of your healthcare team! Please do not hesitate to contact our office at [phone] option 2 with any questions or concerns.     We are restarting a monthly heart failure support group, this will be the last Wednesday of every month from 5-6pm at [facility]. If you would like to attend you will need to RSVP to [email or web address]

## 2024-07-31 RX ORDER — SPIRONOLACTONE 25 MG/1
25 TABLET ORAL DAILY
Qty: 90 TABLET | Refills: 3 | Status: SHIPPED | OUTPATIENT
Start: 2024-07-31

## 2024-07-31 NOTE — TELEPHONE ENCOUNTER
Requested Prescriptions     Pending Prescriptions Disp Refills    spironolactone (ALDACTONE) 25 MG tablet [Pharmacy Med Name: SPIRONOLACTONE 25 MG TABLET] 90 tablet 3     Sig: TAKE 1 TABLET BY MOUTH EVERY DAY       Last OV 7/18/24 on scheduling tracker for 1 year follow up.

## 2024-08-05 DIAGNOSIS — Z51.81 ENCOUNTER FOR MONITORING DIGOXIN THERAPY: Primary | ICD-10-CM

## 2024-08-05 DIAGNOSIS — Z79.899 ENCOUNTER FOR MONITORING DIGOXIN THERAPY: Primary | ICD-10-CM

## 2024-08-07 RX ORDER — DIGOXIN 250 MCG
TABLET ORAL
Qty: 90 TABLET | Refills: 1 | Status: SHIPPED | OUTPATIENT
Start: 2024-08-07

## 2024-08-07 NOTE — TELEPHONE ENCOUNTER
Requested Prescriptions     Pending Prescriptions Disp Refills    digoxin (LANOXIN) 250 MCG tablet [Pharmacy Med Name: DIGOXIN 250 MCG TABLET] 90 tablet 1     Sig: TAKE 1 TABLET BY MOUTH EVERY DAY      Last appt July 2024  Next appt TBD 1 year  Dig level due in January

## 2024-12-24 RX ORDER — EMPAGLIFLOZIN 10 MG/1
10 TABLET, FILM COATED ORAL DAILY
Qty: 90 TABLET | Refills: 1 | Status: SHIPPED | OUTPATIENT
Start: 2024-12-24

## 2024-12-24 NOTE — TELEPHONE ENCOUNTER
Requested Prescriptions     Pending Prescriptions Disp Refills    JARDIANCE 10 MG tablet [Pharmacy Med Name: JARDIANCE 10 MG TABLET] 90 tablet 1     Sig: TAKE 1 TABLET BY MOUTH EVERY DAY        no

## 2025-02-10 RX ORDER — DIGOXIN 250 MCG
TABLET ORAL
Qty: 90 TABLET | Refills: 0 | Status: SHIPPED | OUTPATIENT
Start: 2025-02-10

## 2025-02-10 NOTE — TELEPHONE ENCOUNTER
Requested Prescriptions     Pending Prescriptions Disp Refills    digoxin (LANOXIN) 250 MCG tablet [Pharmacy Med Name: DIGOXIN 250 MCG TABLET] 90 tablet 0     Sig: TAKE 1 TABLET BY MOUTH EVERY DAY     Last appointment 7/2024 and on scheduling tracker for 1 year follow up.

## 2025-03-31 ENCOUNTER — TRANSCRIBE ORDERS (OUTPATIENT)
Facility: HOSPITAL | Age: 60
End: 2025-03-31

## 2025-03-31 DIAGNOSIS — I42.9 CARDIOMYOPATHY, UNSPECIFIED TYPE (HCC): Primary | ICD-10-CM

## 2025-03-31 DIAGNOSIS — Z13.6 SCREENING FOR CARDIOVASCULAR CONDITION: ICD-10-CM

## 2025-04-14 ENCOUNTER — HOSPITAL ENCOUNTER (OUTPATIENT)
Facility: HOSPITAL | Age: 60
Discharge: HOME | End: 2025-04-17

## 2025-04-14 DIAGNOSIS — Z13.6 SCREENING FOR CARDIOVASCULAR CONDITION: ICD-10-CM

## 2025-04-14 PROCEDURE — 75571 CT HRT W/O DYE W/CA TEST: CPT

## 2025-04-15 ENCOUNTER — HOSPITAL ENCOUNTER (OUTPATIENT)
Facility: HOSPITAL | Age: 60
Discharge: HOME | End: 2025-04-17
Payer: COMMERCIAL

## 2025-04-15 VITALS
WEIGHT: 149 LBS | SYSTOLIC BLOOD PRESSURE: 121 MMHG | BODY MASS INDEX: 23.39 KG/M2 | DIASTOLIC BLOOD PRESSURE: 85 MMHG | HEIGHT: 67 IN

## 2025-04-15 DIAGNOSIS — I42.9 CARDIOMYOPATHY, UNSPECIFIED TYPE (HCC): ICD-10-CM

## 2025-04-15 LAB
ECHO AO ROOT DIAM: 3.2 CM
ECHO AO ROOT INDEX: 1.8 CM/M2
ECHO AV AREA PEAK VELOCITY: 1.4 CM2
ECHO AV AREA VTI: 1.6 CM2
ECHO AV AREA/BSA PEAK VELOCITY: 0.8 CM2/M2
ECHO AV AREA/BSA VTI: 0.9 CM2/M2
ECHO AV MEAN GRADIENT: 1 MMHG
ECHO AV MEAN VELOCITY: 0.5 M/S
ECHO AV PEAK GRADIENT: 3 MMHG
ECHO AV PEAK VELOCITY: 0.8 M/S
ECHO AV VELOCITY RATIO: 0.63
ECHO AV VTI: 12.7 CM
ECHO BSA: 1.79 M2
ECHO LV E' LATERAL VELOCITY: 3.96 CM/S
ECHO LV E' SEPTAL VELOCITY: 3.48 CM/S
ECHO LV EDV A2C: 36 ML
ECHO LV EDV A4C: 27 ML
ECHO LV EDV BP: 31 ML (ref 67–155)
ECHO LV EDV INDEX A4C: 15 ML/M2
ECHO LV EDV INDEX BP: 17 ML/M2
ECHO LV EDV NDEX A2C: 20 ML/M2
ECHO LV EF PHYSICIAN: 50 %
ECHO LV EJECTION FRACTION A2C: 49 %
ECHO LV EJECTION FRACTION A4C: 59 %
ECHO LV ESV A2C: 18 ML
ECHO LV ESV A4C: 11 ML
ECHO LV ESV BP: 14 ML (ref 22–58)
ECHO LV ESV INDEX A2C: 10 ML/M2
ECHO LV ESV INDEX A4C: 6 ML/M2
ECHO LV ESV INDEX BP: 8 ML/M2
ECHO LV FRACTIONAL SHORTENING: 20 % (ref 28–44)
ECHO LV INTERNAL DIMENSION DIASTOLE INDEX: 1.4 CM/M2
ECHO LV INTERNAL DIMENSION DIASTOLIC: 2.5 CM (ref 4.2–5.9)
ECHO LV INTERNAL DIMENSION SYSTOLIC INDEX: 1.12 CM/M2
ECHO LV INTERNAL DIMENSION SYSTOLIC: 2 CM
ECHO LV IVSD: 1.1 CM (ref 0.6–1)
ECHO LV MASS 2D: 74 G (ref 88–224)
ECHO LV MASS INDEX 2D: 41.6 G/M2 (ref 49–115)
ECHO LV POSTERIOR WALL DIASTOLIC: 1.1 CM (ref 0.6–1)
ECHO LV RELATIVE WALL THICKNESS RATIO: 0.88
ECHO LVOT AREA: 2.3 CM2
ECHO LVOT AV VTI INDEX: 0.69
ECHO LVOT DIAM: 1.7 CM
ECHO LVOT MEAN GRADIENT: 1 MMHG
ECHO LVOT PEAK GRADIENT: 1 MMHG
ECHO LVOT PEAK VELOCITY: 0.5 M/S
ECHO LVOT STROKE VOLUME INDEX: 11.1 ML/M2
ECHO LVOT SV: 19.7 ML
ECHO LVOT VTI: 8.7 CM
ECHO MV A VELOCITY: 0.55 M/S
ECHO MV AREA VTI: 3.2 CM2
ECHO MV E DECELERATION TIME (DT): 93.3 MS
ECHO MV E VELOCITY: 0.33 M/S
ECHO MV E/A RATIO: 0.6
ECHO MV E/E' LATERAL: 8.33
ECHO MV E/E' RATIO (AVERAGED): 8.91
ECHO MV E/E' SEPTAL: 9.48
ECHO MV LVOT VTI INDEX: 0.71
ECHO MV MAX VELOCITY: 0.6 M/S
ECHO MV MEAN GRADIENT: 1 MMHG
ECHO MV MEAN VELOCITY: 0.4 M/S
ECHO MV PEAK GRADIENT: 1 MMHG
ECHO MV VTI: 6.2 CM
ECHO PV MAX VELOCITY: 0.5 M/S
ECHO PV PEAK GRADIENT: 1 MMHG
ECHO RV INTERNAL DIMENSION: 4.5 CM
ECHO RV TAPSE: 1.2 CM (ref 1.7–?)

## 2025-04-15 PROCEDURE — 93306 TTE W/DOPPLER COMPLETE: CPT

## 2025-04-17 ENCOUNTER — PATIENT MESSAGE (OUTPATIENT)
Age: 60
End: 2025-04-17

## 2025-04-21 NOTE — PROGRESS NOTES
ADVANCED HEART FAILURE CENTER  [facility] in [place], [place]  Heart Failure Outpatient Clinic Note    Patient name: Fuentes Simons  Patient : 1965  Patient MRN: [id number]  Date of service: 25    Primary care physician: Kennedy Bowling MD  Primary cardiologist: Petr Solis MD  Primary F cardiologist: Jefferson Herrera MD      CHIEF COMPLAINT:  Follow up for chronic diastolic heart failure  Chief Complaint   Patient presents with    Shortness of Breath     With exertion     Dizziness     More frequent        ASSESSMENT:  Fuentes Simons is a 59 y.o. male with a history of chronic diastolic and right heart failure, stage C. On optimal GDMT, limited by blood pressure. Has pathogenic TTN gene mutation. Annual ECG and Echo surveillance.   Complaining of cough and congestion. We discussed typical cough that can be associated with Entresto, though his cough by sound and description seems to be more post nasal/allergy related. Recommended ENT evaluation    PLAN:  Heart failure/Cardiomyopathy:  NYHA II  Continue current medical therapy for heart failure:  ACE/ARB/ARNi: Continue Entresto 24-26 mg BID  MRA: Continue Spironolactone 25 mg daily  SGLT2 inhibitor: Continue Jardiance 10 mg daily  Diuretic: Continue Lasix 20 mg daily  Continue Digoxin 0.25 mg daily for RV dysfunction  Echo 2025 reviewed  Reinforced low salt diet  Reinforced fluid restriction to 6 x 8oz glasses per day  Recommended 30 minutes of aerobic exercise 5 days weekly  Labs: CMP, proBNP, CBC, Digoxin level, Vitamin D today    HLD:  Continue Crestor 20 mg daily  Check lipid panel    Recommend flu vaccine yearly  Follow-up with primary cardiologist  Return to AHF Clinic in 1 year      HISTORY OF PRESENT ILLNESS:  I had the pleasure of seeing Fuentes Simons in Advanced Heart Failure Clinic at [facility].    Fuentes Simons is a 59 y.o. male with a history of Klippel-Feil syndrome (congenital fusion

## 2025-04-22 ENCOUNTER — TELEPHONE (OUTPATIENT)
Age: 60
End: 2025-04-22

## 2025-04-22 NOTE — TELEPHONE ENCOUNTER
Telephone Call RE:  Appointment reminder     Outcome:     [x] Patient confirmed appointment   [] Patient rescheduled appointment for    [] Unable to reach  [] Left message              [x] Other: Parking, Location, $40 Copay

## 2025-04-24 ENCOUNTER — OFFICE VISIT (OUTPATIENT)
Age: 60
End: 2025-04-24
Payer: COMMERCIAL

## 2025-04-24 VITALS
DIASTOLIC BLOOD PRESSURE: 70 MMHG | RESPIRATION RATE: 18 BRPM | HEART RATE: 84 BPM | BODY MASS INDEX: 23.49 KG/M2 | SYSTOLIC BLOOD PRESSURE: 102 MMHG | TEMPERATURE: 97.9 F | WEIGHT: 150 LBS | OXYGEN SATURATION: 93 %

## 2025-04-24 DIAGNOSIS — Z51.81 ENCOUNTER FOR MONITORING DIGOXIN THERAPY: ICD-10-CM

## 2025-04-24 DIAGNOSIS — Z79.899 ENCOUNTER FOR MONITORING DIGOXIN THERAPY: ICD-10-CM

## 2025-04-24 DIAGNOSIS — E78.2 MIXED HYPERLIPIDEMIA: ICD-10-CM

## 2025-04-24 DIAGNOSIS — I50.32 CHRONIC DIASTOLIC HEART FAILURE (HCC): Primary | ICD-10-CM

## 2025-04-24 PROCEDURE — 3074F SYST BP LT 130 MM HG: CPT | Performed by: INTERNAL MEDICINE

## 2025-04-24 PROCEDURE — 99213 OFFICE O/P EST LOW 20 MIN: CPT | Performed by: INTERNAL MEDICINE

## 2025-04-24 PROCEDURE — 3078F DIAST BP <80 MM HG: CPT | Performed by: INTERNAL MEDICINE

## 2025-04-24 NOTE — PATIENT INSTRUCTIONS
Medication changes:    none    Testing Ordered:    Labs in clinic today    Other Recommendations:      - Record blood pressure and heart rate daily before medication and two hours after medication  - Record weight daily upon waking/after using the bathroom.   - Keep a written records of your weights and blood pressure and bring to your next appointment.   - Call the clinic at if you have a weight gain of 3 or more pounds overnight OR 5 or more pounds in one week, new/worsened shortness of breath or swelling, or if your blood pressure begins to consistently run below 90/60 and/or you begin to experience dizziness or lightheadedness. Our office phone number [phone] option 2.  - Ensure you are drinking an adequate amount of water with a goal of 6-8 eight ounce glasses (1.5-2 liters) of fluid daily. Your urine should be clear and light yellow straw colored.       Follow up in 1 year with [facility]    Our monthly Heart Failure Support Group is held on the last Wednesday of every month from 5-6pm at [facility]. If you would like to attend, please RSVP to [email or web address]    Thank you for allowing us the privilege of being a part of your healthcare team! Please do not hesitate to contact our office at [phone] option 2 with any questions or concerns.

## 2025-04-25 ENCOUNTER — RESULTS FOLLOW-UP (OUTPATIENT)
Age: 60
End: 2025-04-25

## 2025-04-25 DIAGNOSIS — Z51.81 ENCOUNTER FOR MONITORING DIGOXIN THERAPY: ICD-10-CM

## 2025-04-25 DIAGNOSIS — I50.32 CHRONIC DIASTOLIC HEART FAILURE (HCC): Primary | ICD-10-CM

## 2025-04-25 DIAGNOSIS — Z79.899 ENCOUNTER FOR MONITORING DIGOXIN THERAPY: ICD-10-CM

## 2025-04-25 LAB
ALBUMIN SERPL-MCNC: 4.5 G/DL (ref 3.8–4.9)
ALP SERPL-CCNC: 81 IU/L (ref 44–121)
ALT SERPL-CCNC: 29 IU/L (ref 0–44)
AST SERPL-CCNC: 29 IU/L (ref 0–40)
BILIRUB SERPL-MCNC: 0.7 MG/DL (ref 0–1.2)
BUN SERPL-MCNC: 21 MG/DL (ref 6–24)
BUN/CREAT SERPL: 19 (ref 9–20)
CALCIUM SERPL-MCNC: 9.1 MG/DL (ref 8.7–10.2)
CHLORIDE SERPL-SCNC: 94 MMOL/L (ref 96–106)
CHOLEST SERPL-MCNC: 160 MG/DL (ref 100–199)
CO2 SERPL-SCNC: 23 MMOL/L (ref 20–29)
CREAT SERPL-MCNC: 1.12 MG/DL (ref 0.76–1.27)
DIGOXIN SERPL-MCNC: 1.1 NG/ML (ref 0.5–0.9)
EGFRCR SERPLBLD CKD-EPI 2021: 77 ML/MIN/1.73
ERYTHROCYTE [DISTWIDTH] IN BLOOD BY AUTOMATED COUNT: 13 % (ref 11.6–15.4)
GLOBULIN SER CALC-MCNC: 2.4 G/DL (ref 1.5–4.5)
GLUCOSE SERPL-MCNC: 109 MG/DL (ref 70–99)
HCT VFR BLD AUTO: 60.3 % (ref 37.5–51)
HDLC SERPL-MCNC: 40 MG/DL
HGB BLD-MCNC: 20.4 G/DL (ref 13–17.7)
LDLC SERPL CALC-MCNC: 94 MG/DL (ref 0–99)
MCH RBC QN AUTO: 31.8 PG (ref 26.6–33)
MCHC RBC AUTO-ENTMCNC: 33.8 G/DL (ref 31.5–35.7)
MCV RBC AUTO: 94 FL (ref 79–97)
NT-PROBNP SERPL-MCNC: 711 PG/ML (ref 0–210)
PLATELET # BLD AUTO: 235 X10E3/UL (ref 150–450)
POTASSIUM SERPL-SCNC: 5.1 MMOL/L (ref 3.5–5.2)
PROT SERPL-MCNC: 6.9 G/DL (ref 6–8.5)
RBC # BLD AUTO: 6.41 X10E6/UL (ref 4.14–5.8)
SODIUM SERPL-SCNC: 140 MMOL/L (ref 134–144)
TRIGL SERPL-MCNC: 145 MG/DL (ref 0–149)
VLDLC SERPL CALC-MCNC: 26 MG/DL (ref 5–40)
WBC # BLD AUTO: 7.5 X10E3/UL (ref 3.4–10.8)

## 2025-04-25 RX ORDER — DIGOXIN 250 MCG
125 TABLET ORAL DAILY
Qty: 90 TABLET | Refills: 0 | Status: SHIPPED | OUTPATIENT
Start: 2025-04-25

## 2025-04-25 NOTE — TELEPHONE ENCOUNTER
----- Message from Dr. Emma Herrera MD sent at 4/25/2025 11:13 AM EDT -----  Please let patient know his Digoxin level was a bit high at 1.1. His RV function is normal. I would like to reduce Digoxin to 0.125 mg daily. Repeat Digoxin level in 1 week. proBNP level was elevated from prior. I would like for him to increase Lasix to 40 mg x 3 days and then back to 20 mg daily. Renal function and electrolytes are normal. Cholesterol looks very good. His hemoglobin is 20.4. This may be a lab error. Lets first redraw the CBC with the Digoxin in 1 week. If still elevated, this may be related to his oxygen levels being low more chronically. Can you ask him over the next week to check his oxygen levels, especially after an activity.     @3290 - ZO message left for Mr. Simons requesting he call [facility] regarding his lab results.    Spoke with Mr. Simons who returned my call. Discussed with him the above lab results and the recommendations for medication changes, lab orders and checking O2 levels. He verbalized understanding. [facility] he returns from vacation, around 5/5. I also referred him the [employer] message sent with this information.

## 2025-05-05 DIAGNOSIS — I50.32 CHRONIC DIASTOLIC HEART FAILURE (HCC): ICD-10-CM

## 2025-05-05 DIAGNOSIS — Z51.81 ENCOUNTER FOR MONITORING DIGOXIN THERAPY: ICD-10-CM

## 2025-05-05 DIAGNOSIS — Z79.899 ENCOUNTER FOR MONITORING DIGOXIN THERAPY: ICD-10-CM

## 2025-05-12 RX ORDER — DIGOXIN 250 MCG
250 TABLET ORAL DAILY
Qty: 90 TABLET | Refills: 1 | Status: SHIPPED | OUTPATIENT
Start: 2025-05-12

## 2025-05-12 NOTE — TELEPHONE ENCOUNTER
Requested Prescriptions     Pending Prescriptions Disp Refills    digoxin (LANOXIN) 250 MCG tablet [Pharmacy Med Name: DIGOXIN 250 MCG TABLET] 90 tablet 1     Sig: TAKE 1 TABLET BY MOUTH EVERY DAY      Last appt 4/24  Next appt TBD 1 year 4/2026

## 2025-05-16 LAB
ERYTHROCYTE [DISTWIDTH] IN BLOOD BY AUTOMATED COUNT: 13.6 % (ref 11.6–15.4)
HCT VFR BLD AUTO: 62.7 % (ref 37.5–51)
HGB BLD-MCNC: 20.5 G/DL (ref 13–17.7)
MCH RBC QN AUTO: 31.1 PG (ref 26.6–33)
MCHC RBC AUTO-ENTMCNC: 32.7 G/DL (ref 31.5–35.7)
MCV RBC AUTO: 95 FL (ref 79–97)
PLATELET # BLD AUTO: 230 X10E3/UL (ref 150–450)
RBC # BLD AUTO: 6.6 X10E6/UL (ref 4.14–5.8)
WBC # BLD AUTO: 9.8 X10E3/UL (ref 3.4–10.8)

## 2025-05-17 LAB — DIGOXIN SERPL-MCNC: 1.2 NG/ML (ref 0.5–0.9)

## 2025-05-19 ENCOUNTER — RESULTS FOLLOW-UP (OUTPATIENT)
Age: 60
End: 2025-05-19

## 2025-05-19 NOTE — TELEPHONE ENCOUNTER
----- Message from Dr. Emma Herrera MD sent at 5/19/2025  8:48 AM EDT -----  Hemoglobin is elevated to 20.5 and I suspect this is related to chronic hypoxia. Please notify patient of results and ask him to follow up with pulmonary. He may need to be wearing oxygen more regularly. Digoxin level remains elevated. He has no indication for digoxin ongoing so lets have him discontinue Digoxin.   ---  @8781 - spoke with Fuentes Simons regarding the above lab results. He stated that he has seen a pulmonologist who is aware of his elevated hemoglobin. The only recommendation was to get a CXR. He could not recall the name of the pulmonologist he saw a few months ago.  He verbalized understanding to discontinue digoxen.  ---  5/21 @8603 - call received from [facility] to report critical value of Hgb: 20.5. As stated above, Dr. Herrera aware.

## 2025-06-02 RX ORDER — EMPAGLIFLOZIN 10 MG/1
10 TABLET, FILM COATED ORAL DAILY
Qty: 90 TABLET | Refills: 2 | Status: ON HOLD | OUTPATIENT
Start: 2025-06-02

## 2025-06-02 NOTE — TELEPHONE ENCOUNTER
Requested Prescriptions     Pending Prescriptions Disp Refills    empagliflozin (JARDIANCE) 10 MG tablet [Pharmacy Med Name: JARDIANCE 10 MG TABLET] 90 tablet 2     Sig: TAKE 1 TABLET BY MOUTH EVERY DAY      Last 4/24  Next appt TBD April 2026   Rituxan Pregnancy And Lactation Text: This medication is Pregnancy Category C and it isn't know if it is safe during pregnancy. It is unknown if this medication is excreted in breast milk but similar antibodies are known to be excreted.

## 2025-06-07 ENCOUNTER — APPOINTMENT (OUTPATIENT)
Facility: HOSPITAL | Age: 60
DRG: 189 | End: 2025-06-07
Payer: COMMERCIAL

## 2025-06-07 ENCOUNTER — HOSPITAL ENCOUNTER (INPATIENT)
Facility: HOSPITAL | Age: 60
LOS: 3 days | Discharge: HOME | DRG: 189 | End: 2025-06-10
Attending: EMERGENCY MEDICINE | Admitting: INTERNAL MEDICINE
Payer: COMMERCIAL

## 2025-06-07 DIAGNOSIS — J96.01 ACUTE RESPIRATORY FAILURE WITH HYPOXIA (HCC): ICD-10-CM

## 2025-06-07 DIAGNOSIS — R06.89 DYSPNEA AND RESPIRATORY ABNORMALITIES: Primary | ICD-10-CM

## 2025-06-07 DIAGNOSIS — R06.00 DYSPNEA AND RESPIRATORY ABNORMALITIES: Primary | ICD-10-CM

## 2025-06-07 DIAGNOSIS — R09.02 HYPOXEMIA: ICD-10-CM

## 2025-06-07 DIAGNOSIS — R79.89 ELEVATED BRAIN NATRIURETIC PEPTIDE (BNP) LEVEL: ICD-10-CM

## 2025-06-07 DIAGNOSIS — I50.9 ACUTE ON CHRONIC CONGESTIVE HEART FAILURE, UNSPECIFIED HEART FAILURE TYPE (HCC): ICD-10-CM

## 2025-06-07 PROBLEM — J96.02 ACUTE RESPIRATORY FAILURE WITH HYPOXIA AND HYPERCAPNIA (HCC): Status: ACTIVE | Noted: 2025-06-07

## 2025-06-07 PROBLEM — I50.810 RVF (RIGHT VENTRICULAR FAILURE) (HCC): Status: ACTIVE | Noted: 2025-06-07

## 2025-06-07 PROBLEM — I50.33 ACUTE ON CHRONIC HEART FAILURE WITH PRESERVED EJECTION FRACTION (HFPEF) (HCC): Status: ACTIVE | Noted: 2025-06-07

## 2025-06-07 LAB
ALBUMIN SERPL-MCNC: 3.1 G/DL (ref 3.5–5)
ALBUMIN/GLOB SERPL: 0.8 (ref 1.1–2.2)
ALP SERPL-CCNC: 79 U/L (ref 45–117)
ALT SERPL-CCNC: 33 U/L (ref 12–78)
ANION GAP SERPL CALC-SCNC: 1 MMOL/L (ref 2–12)
ARTERIAL PATENCY WRIST A: YES
ARTERIAL PATENCY WRIST A: YES
AST SERPL-CCNC: 22 U/L (ref 15–37)
B PERT DNA SPEC QL NAA+PROBE: NOT DETECTED
BASE EXCESS BLD CALC-SCNC: 13.5 MMOL/L
BASE EXCESS BLDA CALC-SCNC: 4.4 MMOL/L
BASE EXCESS BLDA CALC-SCNC: 7.8 MMOL/L
BASOPHILS # BLD: 0.06 K/UL (ref 0–0.1)
BASOPHILS NFR BLD: 0.7 % (ref 0–1)
BDY SITE: ABNORMAL
BILIRUB SERPL-MCNC: 0.5 MG/DL (ref 0.2–1)
BORDETELLA PARAPERTUSSIS BY PCR: NOT DETECTED
BUN SERPL-MCNC: 29 MG/DL (ref 6–20)
BUN/CREAT SERPL: 25 (ref 12–20)
C PNEUM DNA SPEC QL NAA+PROBE: NOT DETECTED
CALCIUM SERPL-MCNC: 8.1 MG/DL (ref 8.5–10.1)
CHLORIDE SERPL-SCNC: 99 MMOL/L (ref 97–108)
CK SERPL-CCNC: 346 U/L (ref 39–308)
CO2 SERPL-SCNC: 38 MMOL/L (ref 21–32)
COMMENT:: NORMAL
CREAT SERPL-MCNC: 1.15 MG/DL (ref 0.7–1.3)
DIFFERENTIAL METHOD BLD: ABNORMAL
EOSINOPHIL # BLD: 0.08 K/UL (ref 0–0.4)
EOSINOPHIL NFR BLD: 0.9 % (ref 0–7)
ERYTHROCYTE [DISTWIDTH] IN BLOOD BY AUTOMATED COUNT: 15.9 % (ref 11.5–14.5)
FIO2 ON VENT: 100 %
FIO2 ON VENT: 60 %
FLUAV SUBTYP SPEC NAA+PROBE: NOT DETECTED
FLUBV RNA SPEC QL NAA+PROBE: NOT DETECTED
GAS FLOW.O2 O2 DELIVERY SYS: ABNORMAL
GAS FLOW.O2 SETTING OXYMISER: 12
GAS FLOW.O2 SETTING OXYMISER: 16
GLOBULIN SER CALC-MCNC: 3.7 G/DL (ref 2–4)
GLUCOSE SERPL-MCNC: 140 MG/DL (ref 65–100)
HADV DNA SPEC QL NAA+PROBE: NOT DETECTED
HCO3 BLD-SCNC: 41.4 MMOL/L (ref 21–28)
HCO3 BLD-SCNC: 42.1 MMOL/L (ref 21–28)
HCO3 BLD-SCNC: 44 MMOL/L (ref 21–28)
HCO3 BLDA-SCNC: 34 MMOL/L (ref 22–26)
HCO3 BLDA-SCNC: 38 MMOL/L (ref 22–26)
HCOV 229E RNA SPEC QL NAA+PROBE: NOT DETECTED
HCOV HKU1 RNA SPEC QL NAA+PROBE: NOT DETECTED
HCOV NL63 RNA SPEC QL NAA+PROBE: NOT DETECTED
HCOV OC43 RNA SPEC QL NAA+PROBE: NOT DETECTED
HCT VFR BLD AUTO: 58.8 % (ref 36.6–50.3)
HGB BLD-MCNC: 19 G/DL (ref 12.1–17)
HMPV RNA SPEC QL NAA+PROBE: DETECTED
HPIV1 RNA SPEC QL NAA+PROBE: NOT DETECTED
HPIV2 RNA SPEC QL NAA+PROBE: NOT DETECTED
HPIV3 RNA SPEC QL NAA+PROBE: NOT DETECTED
HPIV4 RNA SPEC QL NAA+PROBE: NOT DETECTED
IMM GRANULOCYTES # BLD AUTO: 0.12 K/UL (ref 0–0.04)
IMM GRANULOCYTES NFR BLD AUTO: 1.3 % (ref 0–0.5)
IPAP/PIP: 23
LACTATE BLD-SCNC: 1.64 MMOL/L (ref 0.4–2)
LACTATE BLD-SCNC: 2.32 MMOL/L (ref 0.4–2)
LYMPHOCYTES # BLD: 1.2 K/UL (ref 0.8–3.5)
LYMPHOCYTES NFR BLD: 13 % (ref 12–49)
M PNEUMO DNA SPEC QL NAA+PROBE: NOT DETECTED
MCH RBC QN AUTO: 30.8 PG (ref 26–34)
MCHC RBC AUTO-ENTMCNC: 32.3 G/DL (ref 30–36.5)
MCV RBC AUTO: 95.5 FL (ref 80–99)
MONOCYTES # BLD: 0.68 K/UL (ref 0–1)
MONOCYTES NFR BLD: 7.4 % (ref 5–13)
NEUTS SEG # BLD: 7.06 K/UL (ref 1.8–8)
NEUTS SEG NFR BLD: 76.7 % (ref 32–75)
NRBC # BLD: 0.12 K/UL (ref 0–0.01)
NRBC BLD-RTO: 1.3 PER 100 WBC
NT PRO BNP: 5372 PG/ML (ref 0–125)
O2/TOTAL GAS SETTING VFR VENT: 60 %
PCO2 BLD: 71.1 MMHG (ref 35–48)
PCO2 BLD: 88.4 MMHG (ref 35–48)
PCO2 BLD: 92.4 MMHG (ref 35–48)
PCO2 BLDA: 73 MMHG (ref 35–45)
PCO2 BLDA: 82 MMHG (ref 35–45)
PEEP RESPIRATORY: 6
PEEP RESPIRATORY: 6 CMH2O
PEEP RESPIRATORY: 8
PH BLD: 7.26 (ref 7.35–7.45)
PH BLD: 7.29 (ref 7.35–7.45)
PH BLD: 7.4 (ref 7.35–7.45)
PH BLDA: 7.29 (ref 7.35–7.45)
PH BLDA: 7.29 (ref 7.35–7.45)
PLATELET # BLD AUTO: 276 K/UL (ref 150–400)
PMV BLD AUTO: 9.3 FL (ref 8.9–12.9)
PO2 BLD: 128 MMHG (ref 83–108)
PO2 BLD: 204 MMHG (ref 83–108)
PO2 BLD: 41 MMHG (ref 83–108)
PO2 BLDA: 100 MMHG (ref 80–100)
PO2 BLDA: 167 MMHG (ref 80–100)
POTASSIUM SERPL-SCNC: 4.2 MMOL/L (ref 3.5–5.1)
PROT SERPL-MCNC: 6.8 G/DL (ref 6.4–8.2)
RBC # BLD AUTO: 6.16 M/UL (ref 4.1–5.7)
RSV RNA SPEC QL NAA+PROBE: NOT DETECTED
RV+EV RNA SPEC QL NAA+PROBE: NOT DETECTED
SAO2 % BLD: 65.6 % (ref 92–97)
SAO2 % BLD: 97 % (ref 92–97)
SAO2 % BLD: 98 % (ref 92–97)
SAO2 % BLD: 99 % (ref 92–97)
SAO2 % BLD: 99.7 % (ref 92–97)
SAO2% DEVICE SAO2% SENSOR NAME: ABNORMAL
SAO2% DEVICE SAO2% SENSOR NAME: ABNORMAL
SARS-COV-2 RNA RESP QL NAA+PROBE: NOT DETECTED
SERVICE CMNT-IMP: ABNORMAL
SODIUM SERPL-SCNC: 138 MMOL/L (ref 136–145)
SPECIMEN HOLD: NORMAL
SPECIMEN SITE: ABNORMAL
SPECIMEN SITE: ABNORMAL
SPECIMEN TYPE: ABNORMAL
TROPONIN I SERPL HS-MCNC: 9 NG/L (ref 0–76)
VENTILATION MODE VENT: ABNORMAL
VENTILATION MODE VENT: ABNORMAL
VT SETTING VENT: 400
WBC # BLD AUTO: 9.2 K/UL (ref 4.1–11.1)

## 2025-06-07 PROCEDURE — 2000000000 HC ICU R&B

## 2025-06-07 PROCEDURE — 6360000004 HC RX CONTRAST MEDICATION: Performed by: RADIOLOGY

## 2025-06-07 PROCEDURE — 94660 CPAP INITIATION&MGMT: CPT

## 2025-06-07 PROCEDURE — 6360000002 HC RX W HCPCS: Performed by: INTERNAL MEDICINE

## 2025-06-07 PROCEDURE — 2500000003 HC RX 250 WO HCPCS: Performed by: EMERGENCY MEDICINE

## 2025-06-07 PROCEDURE — 96365 THER/PROPH/DIAG IV INF INIT: CPT

## 2025-06-07 PROCEDURE — 83880 ASSAY OF NATRIURETIC PEPTIDE: CPT

## 2025-06-07 PROCEDURE — 94640 AIRWAY INHALATION TREATMENT: CPT

## 2025-06-07 PROCEDURE — 71045 X-RAY EXAM CHEST 1 VIEW: CPT

## 2025-06-07 PROCEDURE — 2500000003 HC RX 250 WO HCPCS: Performed by: NURSE PRACTITIONER

## 2025-06-07 PROCEDURE — 80053 COMPREHEN METABOLIC PANEL: CPT

## 2025-06-07 PROCEDURE — 6370000000 HC RX 637 (ALT 250 FOR IP): Performed by: INTERNAL MEDICINE

## 2025-06-07 PROCEDURE — 94761 N-INVAS EAR/PLS OXIMETRY MLT: CPT

## 2025-06-07 PROCEDURE — 87040 BLOOD CULTURE FOR BACTERIA: CPT

## 2025-06-07 PROCEDURE — 6370000000 HC RX 637 (ALT 250 FOR IP): Performed by: EMERGENCY MEDICINE

## 2025-06-07 PROCEDURE — 2580000003 HC RX 258: Performed by: EMERGENCY MEDICINE

## 2025-06-07 PROCEDURE — 2500000003 HC RX 250 WO HCPCS: Performed by: INTERNAL MEDICINE

## 2025-06-07 PROCEDURE — 6360000002 HC RX W HCPCS: Performed by: EMERGENCY MEDICINE

## 2025-06-07 PROCEDURE — 84484 ASSAY OF TROPONIN QUANT: CPT

## 2025-06-07 PROCEDURE — 82550 ASSAY OF CK (CPK): CPT

## 2025-06-07 PROCEDURE — 2700000000 HC OXYGEN THERAPY PER DAY

## 2025-06-07 PROCEDURE — 96374 THER/PROPH/DIAG INJ IV PUSH: CPT

## 2025-06-07 PROCEDURE — 99223 1ST HOSP IP/OBS HIGH 75: CPT | Performed by: INTERNAL MEDICINE

## 2025-06-07 PROCEDURE — 82803 BLOOD GASES ANY COMBINATION: CPT

## 2025-06-07 PROCEDURE — 71275 CT ANGIOGRAPHY CHEST: CPT

## 2025-06-07 PROCEDURE — 36415 COLL VENOUS BLD VENIPUNCTURE: CPT

## 2025-06-07 PROCEDURE — 83605 ASSAY OF LACTIC ACID: CPT

## 2025-06-07 PROCEDURE — 0202U NFCT DS 22 TRGT SARS-COV-2: CPT

## 2025-06-07 PROCEDURE — 5A09357 ASSISTANCE WITH RESPIRATORY VENTILATION, LESS THAN 24 CONSECUTIVE HOURS, CONTINUOUS POSITIVE AIRWAY PRESSURE: ICD-10-PCS | Performed by: STUDENT IN AN ORGANIZED HEALTH CARE EDUCATION/TRAINING PROGRAM

## 2025-06-07 PROCEDURE — 36600 WITHDRAWAL OF ARTERIAL BLOOD: CPT

## 2025-06-07 PROCEDURE — 85025 COMPLETE CBC W/AUTO DIFF WBC: CPT

## 2025-06-07 PROCEDURE — 93005 ELECTROCARDIOGRAM TRACING: CPT | Performed by: EMERGENCY MEDICINE

## 2025-06-07 PROCEDURE — 96372 THER/PROPH/DIAG INJ SC/IM: CPT

## 2025-06-07 PROCEDURE — 6360000002 HC RX W HCPCS: Performed by: NURSE PRACTITIONER

## 2025-06-07 PROCEDURE — 99285 EMERGENCY DEPT VISIT HI MDM: CPT

## 2025-06-07 RX ORDER — POLYETHYLENE GLYCOL 3350 17 G/17G
17 POWDER, FOR SOLUTION ORAL DAILY PRN
Status: DISCONTINUED | OUTPATIENT
Start: 2025-06-07 | End: 2025-06-10 | Stop reason: HOSPADM

## 2025-06-07 RX ORDER — ENOXAPARIN SODIUM 100 MG/ML
1 INJECTION SUBCUTANEOUS
Status: COMPLETED | OUTPATIENT
Start: 2025-06-07 | End: 2025-06-07

## 2025-06-07 RX ORDER — POTASSIUM CHLORIDE 7.45 MG/ML
10 INJECTION INTRAVENOUS PRN
Status: DISCONTINUED | OUTPATIENT
Start: 2025-06-07 | End: 2025-06-10 | Stop reason: HOSPADM

## 2025-06-07 RX ORDER — ALBUTEROL SULFATE 0.83 MG/ML
2.5 SOLUTION RESPIRATORY (INHALATION)
Status: DISCONTINUED | OUTPATIENT
Start: 2025-06-07 | End: 2025-06-08

## 2025-06-07 RX ORDER — IOPAMIDOL 755 MG/ML
100 INJECTION, SOLUTION INTRAVASCULAR
Status: CANCELLED | OUTPATIENT
Start: 2025-06-07

## 2025-06-07 RX ORDER — SODIUM CHLORIDE 0.9 % (FLUSH) 0.9 %
5-40 SYRINGE (ML) INJECTION EVERY 12 HOURS SCHEDULED
Status: DISCONTINUED | OUTPATIENT
Start: 2025-06-07 | End: 2025-06-10 | Stop reason: HOSPADM

## 2025-06-07 RX ORDER — MAGNESIUM SULFATE IN WATER 40 MG/ML
2000 INJECTION, SOLUTION INTRAVENOUS PRN
Status: DISCONTINUED | OUTPATIENT
Start: 2025-06-07 | End: 2025-06-10 | Stop reason: HOSPADM

## 2025-06-07 RX ORDER — IOPAMIDOL 755 MG/ML
100 INJECTION, SOLUTION INTRAVASCULAR
Status: COMPLETED | OUTPATIENT
Start: 2025-06-07 | End: 2025-06-07

## 2025-06-07 RX ORDER — ALBUTEROL SULFATE 0.83 MG/ML
2.5 SOLUTION RESPIRATORY (INHALATION) EVERY 6 HOURS
Status: DISCONTINUED | OUTPATIENT
Start: 2025-06-07 | End: 2025-06-07

## 2025-06-07 RX ORDER — CARVEDILOL 6.25 MG/1
6.25 TABLET ORAL 2 TIMES DAILY WITH MEALS
Status: DISCONTINUED | OUTPATIENT
Start: 2025-06-07 | End: 2025-06-10 | Stop reason: HOSPADM

## 2025-06-07 RX ORDER — FUROSEMIDE 10 MG/ML
20 INJECTION INTRAMUSCULAR; INTRAVENOUS DAILY
Status: DISCONTINUED | OUTPATIENT
Start: 2025-06-07 | End: 2025-06-08

## 2025-06-07 RX ORDER — AZITHROMYCIN 250 MG/1
500 TABLET, FILM COATED ORAL
Status: DISCONTINUED | OUTPATIENT
Start: 2025-06-07 | End: 2025-06-07

## 2025-06-07 RX ORDER — ACETAMINOPHEN 325 MG/1
650 TABLET ORAL EVERY 6 HOURS PRN
Status: DISCONTINUED | OUTPATIENT
Start: 2025-06-07 | End: 2025-06-10 | Stop reason: HOSPADM

## 2025-06-07 RX ORDER — SODIUM CHLORIDE 9 MG/ML
INJECTION, SOLUTION INTRAVENOUS PRN
Status: DISCONTINUED | OUTPATIENT
Start: 2025-06-07 | End: 2025-06-10 | Stop reason: HOSPADM

## 2025-06-07 RX ORDER — IPRATROPIUM BROMIDE AND ALBUTEROL SULFATE 2.5; .5 MG/3ML; MG/3ML
1 SOLUTION RESPIRATORY (INHALATION)
Status: COMPLETED | OUTPATIENT
Start: 2025-06-07 | End: 2025-06-07

## 2025-06-07 RX ORDER — ONDANSETRON 2 MG/ML
4 INJECTION INTRAMUSCULAR; INTRAVENOUS EVERY 6 HOURS PRN
Status: DISCONTINUED | OUTPATIENT
Start: 2025-06-07 | End: 2025-06-10 | Stop reason: HOSPADM

## 2025-06-07 RX ORDER — ACETAMINOPHEN 650 MG/1
650 SUPPOSITORY RECTAL EVERY 6 HOURS PRN
Status: DISCONTINUED | OUTPATIENT
Start: 2025-06-07 | End: 2025-06-10 | Stop reason: HOSPADM

## 2025-06-07 RX ORDER — SODIUM CHLORIDE 0.9 % (FLUSH) 0.9 %
5-40 SYRINGE (ML) INJECTION PRN
Status: DISCONTINUED | OUTPATIENT
Start: 2025-06-07 | End: 2025-06-10 | Stop reason: HOSPADM

## 2025-06-07 RX ORDER — POTASSIUM CHLORIDE 29.8 MG/ML
20 INJECTION INTRAVENOUS PRN
Status: DISCONTINUED | OUTPATIENT
Start: 2025-06-07 | End: 2025-06-10 | Stop reason: HOSPADM

## 2025-06-07 RX ORDER — ONDANSETRON 4 MG/1
4 TABLET, ORALLY DISINTEGRATING ORAL EVERY 8 HOURS PRN
Status: DISCONTINUED | OUTPATIENT
Start: 2025-06-07 | End: 2025-06-10 | Stop reason: HOSPADM

## 2025-06-07 RX ORDER — ENOXAPARIN SODIUM 100 MG/ML
40 INJECTION SUBCUTANEOUS DAILY
Status: DISCONTINUED | OUTPATIENT
Start: 2025-06-08 | End: 2025-06-10 | Stop reason: HOSPADM

## 2025-06-07 RX ORDER — FUROSEMIDE 10 MG/ML
40 INJECTION INTRAMUSCULAR; INTRAVENOUS ONCE
Status: COMPLETED | OUTPATIENT
Start: 2025-06-07 | End: 2025-06-07

## 2025-06-07 RX ADMIN — CEFTRIAXONE 1000 MG: 1 INJECTION, POWDER, FOR SOLUTION INTRAMUSCULAR; INTRAVENOUS at 09:57

## 2025-06-07 RX ADMIN — WATER 40 MG: 1 INJECTION INTRAMUSCULAR; INTRAVENOUS; SUBCUTANEOUS at 21:36

## 2025-06-07 RX ADMIN — ENOXAPARIN SODIUM 70 MG: 80 INJECTION SUBCUTANEOUS at 09:44

## 2025-06-07 RX ADMIN — ALBUTEROL SULFATE 2.5 MG: 2.5 SOLUTION RESPIRATORY (INHALATION) at 20:46

## 2025-06-07 RX ADMIN — IPRATROPIUM BROMIDE AND ALBUTEROL SULFATE 1 DOSE: .5; 3 SOLUTION RESPIRATORY (INHALATION) at 10:08

## 2025-06-07 RX ADMIN — FUROSEMIDE 20 MG: 10 INJECTION, SOLUTION INTRAMUSCULAR; INTRAVENOUS at 17:36

## 2025-06-07 RX ADMIN — CARVEDILOL 6.25 MG: 6.25 TABLET, FILM COATED ORAL at 17:36

## 2025-06-07 RX ADMIN — IOPAMIDOL 82 ML: 755 INJECTION, SOLUTION INTRAVENOUS at 12:03

## 2025-06-07 RX ADMIN — FUROSEMIDE 40 MG: 10 INJECTION, SOLUTION INTRAMUSCULAR; INTRAVENOUS at 09:45

## 2025-06-07 RX ADMIN — AZITHROMYCIN MONOHYDRATE 500 MG: 500 INJECTION, POWDER, LYOPHILIZED, FOR SOLUTION INTRAVENOUS at 10:21

## 2025-06-07 RX ADMIN — SODIUM CHLORIDE, PRESERVATIVE FREE 10 ML: 5 INJECTION INTRAVENOUS at 19:31

## 2025-06-07 ASSESSMENT — PAIN SCALES - GENERAL: PAINLEVEL_OUTOF10: 0

## 2025-06-07 ASSESSMENT — PAIN - FUNCTIONAL ASSESSMENT: PAIN_FUNCTIONAL_ASSESSMENT: NONE - DENIES PAIN

## 2025-06-07 NOTE — ED NOTES
TRANSFER - OUT REPORT:    Verbal report given to VANESA Gamez on Fuentes Simons  being transferred to [facility] ED for routine progression of patient care       Report consisted of patient's Situation, Background, Assessment and   Recommendations(SBAR).     Information from the following report(s) Nurse Handoff Report, Index, ED Encounter Summary, ED SBAR, Intake/Output, MAR, Med Rec Status, Cardiac Rhythm  , Neuro Assessment, and Event Log was reviewed with the receiving nurse.    [place] Fall Assessment:                         Reassessment at this time unchanged. Pt stable for transport as ordered by Dr. Vasquez. Pt on Bipap 15/5 @100%FIO2 94%o2sat.   Lines:   Peripheral IV 06/07/25 Right Antecubital (Active)       Peripheral IV 06/07/25 Distal;Left;Anterior Forearm (Active)       Peripheral IV 06/07/25 Posterior;Right Hand (Active)        Opportunity for questions and clarification was provided.      Patient transported with:  Monitor  Bipap 15/5 @100%FIO2 94%o2sat.   Azithromycin 250ml/hr  [facility] Critical Care truck

## 2025-06-07 NOTE — ED NOTES
TRANSFER - OUT REPORT:    Verbal report given to Anthony Paramedic on Fuentes Simons  being transferred to [facility] ED  for routine progression of patient care       Report consisted of patient's Situation, Background, Assessment and   Recommendations(SBAR).     Information from the following report(s) Nurse Handoff Report, ED Encounter Summary, ED SBAR, Adult Overview, Intake/Output, MAR, Recent Results, Med Rec Status, Cardiac Rhythm  , Neuro Assessment, and Event Log was reviewed with the receiving nurse.    [place] Fall Assessment:                         Reassessment at this time, Pt ABG POC pCO2 92.4. Pt stable for transport as ordered by Dr. Vasquez  Lines:   Peripheral IV 06/07/25 Right Antecubital (Active)       Peripheral IV 06/07/25 Distal;Left;Anterior Forearm (Active)       Peripheral IV 06/07/25 Posterior;Right Hand (Active)        Opportunity for questions and clarification was provided.      Patient transported with:  Monitor  Bipap 23/8 @85%Fio2 99%O2sats   [facility] critical care truck

## 2025-06-07 NOTE — CONSULTS
[facility] CARDIOLOGY                    Cardiology Care Note     [x]Initial Encounter     []Follow-up    Patient Name: Fuentes Simons - :1965 - MRN:[id number]  Primary Cardiologist:   Consulting Cardiologist: Liu Barrios MD     Reason for encounter: Dyspnea    HPI:       Fuentes Simons is a 59 y.o. male with PMH significant for severe restrictive lung disease/Klippel Feil syndrome/pulmonary hypertension/RV dysfunction/chronic dyspnea who was admitted for hide acute hypoxic respiratory failure and is currently on BiPAP.  History of cough/dyspnea.    Subjective:      Fuentes Simons reports dyspnea.     Assessment and Plan     Hypoxic respiratory failure  History of Klippel-Feil syndrome/restrictive lung disease/severe pulmonary hypertension  Acute on chronic HFpEF( Echo 3/2025 - EF 50%)/RV dysfunction/ Hx of being positive for pathogenic TTN gene  Hypertension  History of PFO     Plan:  On BiPAP  Patient followed by advanced heart failure clinic and was last seen on 2025  PTA-on Entresto/Aldactone/Jardiance/Lasix 20 mg daily/digoxin 0.425 mg daily  Check dig level  Continue GDMT as tolerated.  IV Lasix 20 mg twice daily                ____________________________________________________________    Cardiac testing  04/15/25    ECHO (TTE) COMPLETE (PRN CONTRAST/BUBBLE/STRAIN/3D) 04/15/2025 12:45 PM (Final)    Interpretation Summary    Left Ventricle: Low normal left ventricular systolic function. EF by visual approximation is 50%. Left ventricle size is normal. Normal wall thickness. Normal wall motion. Normal diastolic function.    Right Ventricle: Right ventricle size is normal. Normal systolic function.    Image quality is adequate. Procedure performed with the patient in a supine position.    Signed by: Mickey Dietz MD on 4/15/2025 12:45 PM    22    NM STRESS TEST WITH MYOCARDIAL PERFUSION 2016 12:00 AM (Final)  Signed by:

## 2025-06-07 NOTE — ED NOTES
The MD has educated the patient on the purpose and advantages of NIV/Bipap. I have educated the patient on NPO status, vital sign frequency and monitoring requirements of NIV. I have also educated the patient to immediately inform this RN of any nausea, chest discomfort, sudden increase in SOB or severe headache. The patient verbalizes understanding. All questions answered. Stretcher remains locked and lowered, call bell within reach.

## 2025-06-07 NOTE — ED NOTES
Pt on BiPap 12/5 Respiratory rate 15 at 100%FiO2 spo2 96% on these settings per Dr. Vasquez, pt to continue on these settings. Pt tolerating well. Pt respiratory rate unlabored,pt in no obvious distress at this time. Alert and oriented x 4.

## 2025-06-07 NOTE — ED NOTES
Pt unlabored on Bipap 23/8 @ 85% Fio2 99%O2 sats per RT M[place]a and Dr. Vasquez . Pt tolerating well.

## 2025-06-07 NOTE — ED NOTES
TRANSFER - OUT REPORT:    Verbal report given to Armand ALEXIS on Fuentes Simons  being transferred to ICU for routine progression of patient care       Report consisted of patient's Situation, Background, Assessment and   Recommendations(SBAR).     Information from the following report(s) Nurse Handoff Report, Index, ED SBAR, Adult Overview, MAR, and Recent Results was reviewed with the receiving nurse.    [place] Fall Assessment:    Presents to emergency department  because of falls (Syncope, seizure, or loss of consciousness): No  Age > 70: No  Altered Mental Status, Intoxication with alcohol or substance confusion (Disorientation, impaired judgment, poor safety awaremess, or inability to follow instructions): No  Impaired Mobility: Ambulates or transfers with assistive devices or assistance; Unable to ambulate or transer.: No  Nursing Judgement: Yes          Lines:   Peripheral IV 06/07/25 Right Antecubital (Active)       Peripheral IV 06/07/25 Distal;Left;Anterior Forearm (Active)       Peripheral IV 06/07/25 Posterior;Right Hand (Active)       Peripheral IV 06/07/25 Left Antecubital (Active)   Site Assessment Clean, dry & intact 06/07/25 1331   Line Status Blood return noted 06/07/25 1331   Phlebitis Assessment No symptoms 06/07/25 1331   Infiltration Assessment 0 06/07/25 1331   Dressing Status Clean, dry & intact 06/07/25 1331   Dressing Type Transparent 06/07/25 1331        Opportunity for questions and clarification was provided.      Patient transported with:  Monitor and Registered NurseChandler

## 2025-06-07 NOTE — ED TRIAGE NOTES
Pt to room 5 via wheelchair. Was seen at PCP this week for URI and placed on Zpak. Pt has not had much improvement.  Here today with hypoxia and some lethargy with initial RA sats of 63%. Pt placed on 6LNC with sats reaching 82%the patient placed on O2 mask at 10 liters. Provider at bedside.

## 2025-06-07 NOTE — PLAN OF CARE
Problem: Respiratory - Adult  Goal: Achieves optimal ventilation and oxygenation  6/7/2025 1802 by Claire Valdes RN  Outcome: Progressing  6/7/2025 1135 by Emili Pizano RCP  Outcome: Progressing     Problem: Chronic Conditions and Co-morbidities  Goal: Patient's chronic conditions and co-morbidity symptoms are monitored and maintained or improved  Outcome: Progressing     Problem: Discharge Planning  Goal: Discharge to home or other facility with appropriate resources  Outcome: Progressing     Problem: Safety - Adult  Goal: Free from fall injury  Outcome: Progressing

## 2025-06-07 NOTE — ED NOTES
Pt arrives via critical care team. RT at bedside in process of transferring BiPAP.     Pt alert, able to answer questions appropriately. Pt on BiPAP, tolerating well.     Dr. Eller at bedside assessing pt.

## 2025-06-07 NOTE — ED PROVIDER NOTES
Care assumed from Dr. Vasquez in transfer from [facility] ED.  RT with critical care transport states that he made several changes to his BiPAP settings while in route.  No acute events while en route and on arrival patient is GCS 15 on BiPAP in no distress stating that he is feeling better without chest pain.    CTA chest was ordered to further evaluate and returned showing no PE but trace right pleural effusion.    ABG was repeated and appears to be improving with improving hypercapnia.    Intensivist was consulted, Dr. Greenwood who saw the patient at the bedside and will admit for further care.     David Eller, DO  06/07/25 1254    
chest x-ray lab work.  Consider CHF versus pneumonia versus PE.  No history of DVT or PE.  Patient is not on any blood thinners. Recently his cardiologist discontinued digoxin    Amount and/or Complexity of Data Reviewed  Labs: ordered.  Radiology: ordered.  ECG/medicine tests: ordered.    Risk  Prescription drug management.            REASSESSMENT     ED Course as of 06/07/25 0939   Sat Jun 07, 2025   0935 EKG: Sinus rhythm marked sinus arrhythmia, biatrial enlargement, right axis deviation, nonspecific ST changes.  peaked P waves, rate 95, , QRS 82, QTc of 409 noted.  Also consider possible S1Q3T3   [KP]      ED Course User Index  [KP] Brigida Vasquez DO           CONSULTS:  None    PROCEDURES:  Unless otherwise noted below, none     Procedures  9:42 AM  Noted possible S1Q3T3 and in light of patient's hypoxia, will give dose of Lovenox.  Awaiting labs but we do not have CT scan at [facility].  On arterial blood gases oxygen was extremely low as well as having a bicarb of 40.  Will attempt DuoNeb although I think most likely this is more of a congestive heart failure and hypoxic issue.  Consider increased infiltrate on right on chest x-ray so we will also give course of antibiotics.      .9:48 AM  Spoke with Dr. Eller in ED      9:59 AM  Spoke with Dr. Greenwood in ICU, requests patient go to ED       10:35 AM  Continued increased CO2, will increase rate    FINAL IMPRESSION    No diagnosis found.      DISPOSITION/PLAN   DISPOSITION        PATIENT REFERRED TO:  No follow-up provider specified.    DISCHARGE MEDICATIONS:  New Prescriptions    No medications on file         (Please note that portions of this note were completed with a voice recognition program.  Efforts were made to edit the dictations but occasionally words are mis-transcribed.)    Brigida Vasquez DO (electronically signed)  Emergency Attending Physician / Physician Assistant / Nurse Practitioner

## 2025-06-08 LAB
ANION GAP SERPL CALC-SCNC: 4 MMOL/L (ref 2–12)
ARTERIAL PATENCY WRIST A: ABNORMAL
BASE EXCESS BLD CALC-SCNC: 11.7 MMOL/L
BASOPHILS # BLD: 0.03 K/UL (ref 0–0.1)
BASOPHILS NFR BLD: 0.3 % (ref 0–1)
BDY SITE: ABNORMAL
BUN SERPL-MCNC: 28 MG/DL (ref 6–20)
BUN/CREAT SERPL: 28 (ref 12–20)
CALCIUM SERPL-MCNC: 8.3 MG/DL (ref 8.5–10.1)
CHLORIDE SERPL-SCNC: 97 MMOL/L (ref 97–108)
CO2 SERPL-SCNC: 38 MMOL/L (ref 21–32)
CREAT SERPL-MCNC: 1.01 MG/DL (ref 0.7–1.3)
DIFFERENTIAL METHOD BLD: ABNORMAL
EKG ATRIAL RATE: 95 BPM
EKG DIAGNOSIS: NORMAL
EKG P AXIS: 53 DEGREES
EKG P-R INTERVAL: 208 MS
EKG Q-T INTERVAL: 326 MS
EKG QRS DURATION: 82 MS
EKG QTC CALCULATION (BAZETT): 409 MS
EKG R AXIS: 144 DEGREES
EKG T AXIS: 71 DEGREES
EKG VENTRICULAR RATE: 95 BPM
EOSINOPHIL # BLD: 0.02 K/UL (ref 0–0.4)
EOSINOPHIL NFR BLD: 0.2 % (ref 0–7)
ERYTHROCYTE [DISTWIDTH] IN BLOOD BY AUTOMATED COUNT: 15 % (ref 11.5–14.5)
GAS FLOW.O2 O2 DELIVERY SYS: ABNORMAL
GAS FLOW.O2 SETTING OXYMISER: 20 BPM
GLUCOSE SERPL-MCNC: 123 MG/DL (ref 65–100)
HCO3 BLD-SCNC: 40.3 MMOL/L (ref 21–28)
HCT VFR BLD AUTO: 55.1 % (ref 36.6–50.3)
HGB BLD-MCNC: 17.9 G/DL (ref 12.1–17)
IMM GRANULOCYTES # BLD AUTO: 0.07 K/UL (ref 0–0.04)
IMM GRANULOCYTES NFR BLD AUTO: 0.8 % (ref 0–0.5)
LYMPHOCYTES # BLD: 0.52 K/UL (ref 0.8–3.5)
LYMPHOCYTES NFR BLD: 6 % (ref 12–49)
MAGNESIUM SERPL-MCNC: 1.9 MG/DL (ref 1.6–2.4)
MCH RBC QN AUTO: 31.1 PG (ref 26–34)
MCHC RBC AUTO-ENTMCNC: 32.5 G/DL (ref 30–36.5)
MCV RBC AUTO: 95.7 FL (ref 80–99)
MONOCYTES # BLD: 0.1 K/UL (ref 0–1)
MONOCYTES NFR BLD: 1.2 % (ref 5–13)
NEUTS SEG # BLD: 7.86 K/UL (ref 1.8–8)
NEUTS SEG NFR BLD: 91.5 % (ref 32–75)
NRBC # BLD: 0.02 K/UL (ref 0–0.01)
NRBC BLD-RTO: 0.2 PER 100 WBC
O2/TOTAL GAS SETTING VFR VENT: 50 %
PCO2 BLD: 61.7 MMHG (ref 35–48)
PEEP RESPIRATORY: 6 CMH2O
PH BLD: 7.42 (ref 7.35–7.45)
PHOSPHATE SERPL-MCNC: 5.3 MG/DL (ref 2.6–4.7)
PLATELET # BLD AUTO: 223 K/UL (ref 150–400)
PMV BLD AUTO: 9.3 FL (ref 8.9–12.9)
PO2 BLD: 174 MMHG (ref 83–108)
POTASSIUM SERPL-SCNC: 4.5 MMOL/L (ref 3.5–5.1)
RBC # BLD AUTO: 5.76 M/UL (ref 4.1–5.7)
RBC MORPH BLD: ABNORMAL
SAO2 % BLD: 99.5 % (ref 92–97)
SODIUM SERPL-SCNC: 139 MMOL/L (ref 136–145)
SPECIMEN TYPE: ABNORMAL
VT SETTING VENT: 450 ML
WBC # BLD AUTO: 8.6 K/UL (ref 4.1–11.1)

## 2025-06-08 PROCEDURE — 2500000003 HC RX 250 WO HCPCS: Performed by: INTERNAL MEDICINE

## 2025-06-08 PROCEDURE — 6360000002 HC RX W HCPCS: Performed by: NURSE PRACTITIONER

## 2025-06-08 PROCEDURE — 36415 COLL VENOUS BLD VENIPUNCTURE: CPT

## 2025-06-08 PROCEDURE — 85025 COMPLETE CBC W/AUTO DIFF WBC: CPT

## 2025-06-08 PROCEDURE — 99232 SBSQ HOSP IP/OBS MODERATE 35: CPT | Performed by: INTERNAL MEDICINE

## 2025-06-08 PROCEDURE — 2500000003 HC RX 250 WO HCPCS: Performed by: NURSE PRACTITIONER

## 2025-06-08 PROCEDURE — 83735 ASSAY OF MAGNESIUM: CPT

## 2025-06-08 PROCEDURE — 94761 N-INVAS EAR/PLS OXIMETRY MLT: CPT

## 2025-06-08 PROCEDURE — 2700000000 HC OXYGEN THERAPY PER DAY

## 2025-06-08 PROCEDURE — 93010 ELECTROCARDIOGRAM REPORT: CPT | Performed by: SPECIALIST

## 2025-06-08 PROCEDURE — 84100 ASSAY OF PHOSPHORUS: CPT

## 2025-06-08 PROCEDURE — 80048 BASIC METABOLIC PNL TOTAL CA: CPT

## 2025-06-08 PROCEDURE — 94660 CPAP INITIATION&MGMT: CPT

## 2025-06-08 PROCEDURE — 6360000002 HC RX W HCPCS: Performed by: INTERNAL MEDICINE

## 2025-06-08 PROCEDURE — 82803 BLOOD GASES ANY COMBINATION: CPT

## 2025-06-08 PROCEDURE — 2000000000 HC ICU R&B

## 2025-06-08 PROCEDURE — 94640 AIRWAY INHALATION TREATMENT: CPT

## 2025-06-08 PROCEDURE — 6370000000 HC RX 637 (ALT 250 FOR IP): Performed by: INTERNAL MEDICINE

## 2025-06-08 PROCEDURE — 36600 WITHDRAWAL OF ARTERIAL BLOOD: CPT

## 2025-06-08 RX ORDER — ALBUTEROL SULFATE 0.83 MG/ML
2.5 SOLUTION RESPIRATORY (INHALATION) EVERY 6 HOURS PRN
Status: DISCONTINUED | OUTPATIENT
Start: 2025-06-08 | End: 2025-06-10 | Stop reason: HOSPADM

## 2025-06-08 RX ORDER — FUROSEMIDE 20 MG/1
20 TABLET ORAL 2 TIMES DAILY
Status: DISCONTINUED | OUTPATIENT
Start: 2025-06-09 | End: 2025-06-10 | Stop reason: HOSPADM

## 2025-06-08 RX ADMIN — WATER 40 MG: 1 INJECTION INTRAMUSCULAR; INTRAVENOUS; SUBCUTANEOUS at 20:33

## 2025-06-08 RX ADMIN — FUROSEMIDE 20 MG: 10 INJECTION, SOLUTION INTRAMUSCULAR; INTRAVENOUS at 07:53

## 2025-06-08 RX ADMIN — CARVEDILOL 6.25 MG: 6.25 TABLET, FILM COATED ORAL at 07:52

## 2025-06-08 RX ADMIN — WATER 40 MG: 1 INJECTION INTRAMUSCULAR; INTRAVENOUS; SUBCUTANEOUS at 07:53

## 2025-06-08 RX ADMIN — SODIUM CHLORIDE, PRESERVATIVE FREE 10 ML: 5 INJECTION INTRAVENOUS at 07:56

## 2025-06-08 RX ADMIN — SODIUM CHLORIDE, PRESERVATIVE FREE 10 ML: 5 INJECTION INTRAVENOUS at 20:33

## 2025-06-08 RX ADMIN — ALBUTEROL SULFATE 2.5 MG: 2.5 SOLUTION RESPIRATORY (INHALATION) at 02:36

## 2025-06-08 RX ADMIN — ENOXAPARIN SODIUM 40 MG: 100 INJECTION SUBCUTANEOUS at 07:57

## 2025-06-08 RX ADMIN — ALBUTEROL SULFATE 2.5 MG: 2.5 SOLUTION RESPIRATORY (INHALATION) at 07:11

## 2025-06-08 RX ADMIN — CARVEDILOL 6.25 MG: 6.25 TABLET, FILM COATED ORAL at 16:19

## 2025-06-08 ASSESSMENT — PAIN SCALES - GENERAL: PAINLEVEL_OUTOF10: 0

## 2025-06-08 NOTE — PLAN OF CARE
Problem: Respiratory - Adult  Goal: Achieves optimal ventilation and oxygenation  6/8/2025 0939 by Claire Valdes RN  Outcome: Progressing  6/8/2025 0842 by Danielle Valentine RT  Outcome: Progressing  6/8/2025 0353 by Poncho Powers RN  Outcome: Progressing  6/8/2025 0045 by Watson Bain RCP  Outcome: Progressing     Problem: Chronic Conditions and Co-morbidities  Goal: Patient's chronic conditions and co-morbidity symptoms are monitored and maintained or improved  6/8/2025 0939 by Claire Valdes RN  Outcome: Progressing  6/8/2025 0353 by Poncho Powers RN  Outcome: Progressing     Problem: Discharge Planning  Goal: Discharge to home or other facility with appropriate resources  6/8/2025 0939 by Claire Valdes RN  Outcome: Progressing  6/8/2025 0353 by Poncho Powers RN  Outcome: Progressing     Problem: Safety - Adult  Goal: Free from fall injury  Outcome: Progressing

## 2025-06-08 NOTE — PLAN OF CARE
Problem: Respiratory - Adult  Goal: Achieves optimal ventilation and oxygenation  6/8/2025 0353 by Poncho Powers RN  Outcome: Progressing  6/8/2025 0045 by Watson Bain RCP  Outcome: Progressing  6/7/2025 1802 by Claire Valdes RN  Outcome: Progressing     Problem: Chronic Conditions and Co-morbidities  Goal: Patient's chronic conditions and co-morbidity symptoms are monitored and maintained or improved  6/8/2025 0353 by Poncho Powers RN  Outcome: Progressing  6/7/2025 1802 by Claire Valdes RN  Outcome: Progressing     Problem: Discharge Planning  Goal: Discharge to home or other facility with appropriate resources  6/8/2025 0353 by Poncho Powers RN  Outcome: Progressing  6/7/2025 1802 by Claire Valdes RN  Outcome: Progressing     Problem: Safety - Adult  Goal: Free from fall injury  6/7/2025 1802 by Claire Valdes RN  Outcome: Progressing

## 2025-06-09 LAB
ANION GAP SERPL CALC-SCNC: 1 MMOL/L (ref 2–12)
BASOPHILS # BLD: 0 K/UL (ref 0–0.1)
BASOPHILS NFR BLD: 0 % (ref 0–1)
BUN SERPL-MCNC: 30 MG/DL (ref 6–20)
BUN/CREAT SERPL: 33 (ref 12–20)
CALCIUM SERPL-MCNC: 8 MG/DL (ref 8.5–10.1)
CHLORIDE SERPL-SCNC: 98 MMOL/L (ref 97–108)
CO2 SERPL-SCNC: 37 MMOL/L (ref 21–32)
CREAT SERPL-MCNC: 0.92 MG/DL (ref 0.7–1.3)
DIFFERENTIAL METHOD BLD: ABNORMAL
EOSINOPHIL # BLD: 0 K/UL (ref 0–0.4)
EOSINOPHIL NFR BLD: 0 % (ref 0–7)
ERYTHROCYTE [DISTWIDTH] IN BLOOD BY AUTOMATED COUNT: 15.1 % (ref 11.5–14.5)
GLUCOSE SERPL-MCNC: 163 MG/DL (ref 65–100)
HCT VFR BLD AUTO: 52.3 % (ref 36.6–50.3)
HGB BLD-MCNC: 16.6 G/DL (ref 12.1–17)
IMM GRANULOCYTES # BLD AUTO: 0.06 K/UL (ref 0–0.04)
IMM GRANULOCYTES NFR BLD AUTO: 0.9 % (ref 0–0.5)
LYMPHOCYTES # BLD: 0.49 K/UL (ref 0.8–3.5)
LYMPHOCYTES NFR BLD: 7.6 % (ref 12–49)
MAGNESIUM SERPL-MCNC: 2 MG/DL (ref 1.6–2.4)
MCH RBC QN AUTO: 30.1 PG (ref 26–34)
MCHC RBC AUTO-ENTMCNC: 31.7 G/DL (ref 30–36.5)
MCV RBC AUTO: 94.9 FL (ref 80–99)
MONOCYTES # BLD: 0.13 K/UL (ref 0–1)
MONOCYTES NFR BLD: 2 % (ref 5–13)
NEUTS SEG # BLD: 5.73 K/UL (ref 1.8–8)
NEUTS SEG NFR BLD: 89.5 % (ref 32–75)
NRBC # BLD: 0 K/UL (ref 0–0.01)
NRBC BLD-RTO: 0 PER 100 WBC
PHOSPHATE SERPL-MCNC: 3.9 MG/DL (ref 2.6–4.7)
PLATELET # BLD AUTO: 210 K/UL (ref 150–400)
PMV BLD AUTO: 9.3 FL (ref 8.9–12.9)
POTASSIUM SERPL-SCNC: 4.2 MMOL/L (ref 3.5–5.1)
RBC # BLD AUTO: 5.51 M/UL (ref 4.1–5.7)
SODIUM SERPL-SCNC: 136 MMOL/L (ref 136–145)
WBC # BLD AUTO: 6.4 K/UL (ref 4.1–11.1)

## 2025-06-09 PROCEDURE — 97535 SELF CARE MNGMENT TRAINING: CPT

## 2025-06-09 PROCEDURE — 2500000003 HC RX 250 WO HCPCS: Performed by: NURSE PRACTITIONER

## 2025-06-09 PROCEDURE — 83735 ASSAY OF MAGNESIUM: CPT

## 2025-06-09 PROCEDURE — 1100000000 HC RM PRIVATE

## 2025-06-09 PROCEDURE — 6370000000 HC RX 637 (ALT 250 FOR IP): Performed by: INTERNAL MEDICINE

## 2025-06-09 PROCEDURE — 6370000000 HC RX 637 (ALT 250 FOR IP): Performed by: STUDENT IN AN ORGANIZED HEALTH CARE EDUCATION/TRAINING PROGRAM

## 2025-06-09 PROCEDURE — 97161 PT EVAL LOW COMPLEX 20 MIN: CPT

## 2025-06-09 PROCEDURE — 97116 GAIT TRAINING THERAPY: CPT

## 2025-06-09 PROCEDURE — 2500000003 HC RX 250 WO HCPCS: Performed by: INTERNAL MEDICINE

## 2025-06-09 PROCEDURE — 85025 COMPLETE CBC W/AUTO DIFF WBC: CPT

## 2025-06-09 PROCEDURE — 36415 COLL VENOUS BLD VENIPUNCTURE: CPT

## 2025-06-09 PROCEDURE — 80048 BASIC METABOLIC PNL TOTAL CA: CPT

## 2025-06-09 PROCEDURE — 6360000002 HC RX W HCPCS: Performed by: INTERNAL MEDICINE

## 2025-06-09 PROCEDURE — 97165 OT EVAL LOW COMPLEX 30 MIN: CPT

## 2025-06-09 PROCEDURE — 94660 CPAP INITIATION&MGMT: CPT

## 2025-06-09 PROCEDURE — 84100 ASSAY OF PHOSPHORUS: CPT

## 2025-06-09 PROCEDURE — 6360000002 HC RX W HCPCS: Performed by: NURSE PRACTITIONER

## 2025-06-09 RX ORDER — SPIRONOLACTONE 25 MG/1
25 TABLET ORAL DAILY
Status: DISCONTINUED | OUTPATIENT
Start: 2025-06-09 | End: 2025-06-10 | Stop reason: HOSPADM

## 2025-06-09 RX ORDER — ROSUVASTATIN CALCIUM 10 MG/1
20 TABLET, COATED ORAL
Status: DISCONTINUED | OUTPATIENT
Start: 2025-06-09 | End: 2025-06-10 | Stop reason: HOSPADM

## 2025-06-09 RX ADMIN — SODIUM CHLORIDE, PRESERVATIVE FREE 10 ML: 5 INJECTION INTRAVENOUS at 21:49

## 2025-06-09 RX ADMIN — SACUBITRIL AND VALSARTAN 1 TABLET: 24; 26 TABLET, FILM COATED ORAL at 21:49

## 2025-06-09 RX ADMIN — EMPAGLIFLOZIN 10 MG: 10 TABLET, FILM COATED ORAL at 11:11

## 2025-06-09 RX ADMIN — WATER 40 MG: 1 INJECTION INTRAMUSCULAR; INTRAVENOUS; SUBCUTANEOUS at 08:30

## 2025-06-09 RX ADMIN — CARVEDILOL 6.25 MG: 6.25 TABLET, FILM COATED ORAL at 18:04

## 2025-06-09 RX ADMIN — SPIRONOLACTONE 25 MG: 25 TABLET ORAL at 11:17

## 2025-06-09 RX ADMIN — SODIUM CHLORIDE, PRESERVATIVE FREE 10 ML: 5 INJECTION INTRAVENOUS at 08:32

## 2025-06-09 RX ADMIN — SACUBITRIL AND VALSARTAN 1 TABLET: 24; 26 TABLET, FILM COATED ORAL at 11:11

## 2025-06-09 RX ADMIN — CARVEDILOL 6.25 MG: 6.25 TABLET, FILM COATED ORAL at 08:30

## 2025-06-09 RX ADMIN — ENOXAPARIN SODIUM 40 MG: 100 INJECTION SUBCUTANEOUS at 08:30

## 2025-06-09 RX ADMIN — FUROSEMIDE 20 MG: 40 TABLET ORAL at 18:05

## 2025-06-09 RX ADMIN — ROSUVASTATIN CALCIUM 20 MG: 10 TABLET, FILM COATED ORAL at 21:49

## 2025-06-09 RX ADMIN — FUROSEMIDE 20 MG: 40 TABLET ORAL at 08:30

## 2025-06-09 ASSESSMENT — PAIN SCALES - GENERAL
PAINLEVEL_OUTOF10: 0
PAINLEVEL_OUTOF10: 0

## 2025-06-09 NOTE — CARE COORDINATION
Care Management Initial Assessment  6/9/2025 8:18 AM  If patient is discharged prior to next notation, then this note serves as note for discharge by case management.    Reason for Admission:   Hypoxemia [R09.02]  Dyspnea and respiratory abnormalities [R06.00, R06.89]  Elevated brain natriuretic peptide (BNP) level [R79.89]  Acute respiratory failure with hypoxia (HCC) [J96.01]  Acute respiratory failure with hypoxia and hypercapnia (HCC) [J96.01, J96.02]  Acute on chronic congestive heart failure, unspecified heart failure type (HCC) [I50.9]         Patient Admission Status: Inpatient  Date Admitted to INP: 6/7/25  RUR: Readmission Risk Score: 9.7    Hospitalization in the last 30 days (Readmission):  no        Advance Care Planning:  Code Status: Full Code  Primary Healthcare Decision Maker:  wife-Thom lBanchard   Advance Directive: has NO advanced directive      __________________________________________________________________________  Assessment:   On droplet for metapneumovirus  Is now on 2 liters oxygen ]has restrictive airway disease   Pt is also followed by the advanced heart failure team      Comments: history of Klippel-Feil syndrome    Discharge Concerns: []Yes [x]No []Unknown   Describe:    Financial concerns/barriers: []Yes, explain: [x]No []Unknown/Not discussed  __________________________________________________________________________    Insurer:   Active Insurance as of 6/7/2025       Primary Coverage       Payor Plan Insurance Group Employer/Plan Group    [place] [employer] [id number]       Payor Plan Address Payor Plan Phone Number Payor Plan Fax Number Effective Dates    [address] [address]   11/20/2024 - None Entered    [facility] [id number]         Subscriber Name Subscriber Birth Date Member ID       EDIN BLANCHARD 1965 [id number]                     PCP: Kennedy Bowling MD   Address: [address] [place] [address]   Phone number: [phone]    Pharmacy:   [facility]/pharmacy #[id number] - [place],

## 2025-06-09 NOTE — CARE COORDINATION
Update:    When I met with pt about home trilogy/Bipap ,pt expressed feeling frustrated regarding being asked \"again and again\" about this issue.  Pt states that he was worked up for home device in the past but insurance \"denied it three times.\"  Pt states he became so frustrated that \"I turned in my home oxygen in 2022.\"  I asked pt that uif he qualifies for home trilogy would he use it.He first said \"No\"   I explained to pt that once he is downgraded and pulmonology becomes involved,I will discuss with pulmonology whether he needs home device or not.  Pt states he sees pulmonologist,Dr Estevez with [facility] .    Kristina Sen RN       Per PT,pt has no identified PT needs.  If walking oxymetry is ordered eventually,RT is to be consulted for home oxygen testing.

## 2025-06-09 NOTE — H&P
Hospitalist Admission Note    NAME:  Fuentes Simons   :  1965   MRN:  [id number]     Date/Time:  2025 10:05 AM    Patient PCP: Kennedy Bowling MD  ________________________________________________________________________    Given the patient's current clinical presentation, I have a high level of concern for decompensation if discharged from the emergency department.  Complex decision making was performed, which includes reviewing the patient's available past medical records, laboratory results, and x-ray films.       My assessment of this patient's clinical condition and my plan of care is as follows.    Assessment / Plan:    Fuentes is a 59 y.o.  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  He presented to ER with shortness of breath, hypoxia.      Acute hypoxic, hypercapnic respiratory failure: Likely exacerbated by recent URI.  Viral panel positive for metapneumovirus.  He has underlying restrictive lung disease due to orthopedic surgeries, pulmonary hypertension.  Was initially requiring BiPAP.  Now on nasal cannula.  Continue O2 supplementation, keep sats above 92%.  Wean steroids.  Hopefully discharge home tomorrow.    Acute on chronic HFpEF: Cardiology following.  Patient received IV diuresis. Patient followed by advanced heart failure clinic and was last seen on 2025. Recent echo 4/15/2025, EF 50%.  Resume Entresto, Aldactone.  Continue Coreg.  Diuretics per cardiology.  Monitor volume status closely.    Acute metabolic encephalopathy: Due to hypercapnia.  Resolved.  Continue to monitor mental status.    HTN: Continue Entresto, Coreg, Aldactone.  Monitor vitals.    HLD: Continue statin.      #BMI (Calculated): 24.82      I have personally reviewed the radiographs, laboratory data in Epic and 
        Allergies/Social/Family History:     No Known Allergies   Social History     Tobacco Use    Smoking status: Never    Smokeless tobacco: Never   Substance Use Topics    Alcohol use: Yes     Comment: occassionaly (once a month)      Family History   Problem Relation Age of Onset    Heart Disease Brother          LABS AND  DATA:   Reviewed      Peak airway pressure:      Minute ventilation:        CRITICAL CARE CONSULTANT NOTE  I had a face to face encounter with the patient, reviewed and interpreted patient data including clinical events, labs, images, vital signs, I/O's, and examined patient.  I have discussed the case and the plan and management of the patient's care with the consulting services, the bedside nurses and the respiratory therapist.      NOTE OF PERSONAL INVOLVEMENT IN CARE   This patient has a high probability of imminent, clinically significant deterioration, which requires the highest level of preparedness to intervene urgently. I participated in the decision-making and personally managed or directed the management of the following life and organ supporting interventions that required my frequent assessment to treat or prevent imminent deterioration.      Kira Greenwood MD   Pulmonary/[facility]  [phone]  6/7/2025

## 2025-06-10 VITALS
DIASTOLIC BLOOD PRESSURE: 71 MMHG | TEMPERATURE: 98.1 F | BODY MASS INDEX: 24.6 KG/M2 | HEIGHT: 67 IN | RESPIRATION RATE: 16 BRPM | SYSTOLIC BLOOD PRESSURE: 98 MMHG | WEIGHT: 156.75 LBS | HEART RATE: 78 BPM | OXYGEN SATURATION: 93 %

## 2025-06-10 LAB
ANION GAP SERPL CALC-SCNC: 3 MMOL/L (ref 2–12)
BASOPHILS # BLD: 0.01 K/UL (ref 0–0.1)
BASOPHILS NFR BLD: 0.1 % (ref 0–1)
BUN SERPL-MCNC: 22 MG/DL (ref 6–20)
BUN/CREAT SERPL: 23 (ref 12–20)
CALCIUM SERPL-MCNC: 8.2 MG/DL (ref 8.5–10.1)
CHLORIDE SERPL-SCNC: 96 MMOL/L (ref 97–108)
CO2 SERPL-SCNC: 37 MMOL/L (ref 21–32)
COMMENT:: NORMAL
CREAT SERPL-MCNC: 0.96 MG/DL (ref 0.7–1.3)
DIFFERENTIAL METHOD BLD: ABNORMAL
EOSINOPHIL # BLD: 0 K/UL (ref 0–0.4)
EOSINOPHIL NFR BLD: 0 % (ref 0–7)
ERYTHROCYTE [DISTWIDTH] IN BLOOD BY AUTOMATED COUNT: 15.9 % (ref 11.5–14.5)
GLUCOSE SERPL-MCNC: 119 MG/DL (ref 65–100)
HCT VFR BLD AUTO: 56.3 % (ref 36.6–50.3)
HGB BLD-MCNC: 18.2 G/DL (ref 12.1–17)
IMM GRANULOCYTES # BLD AUTO: 0.07 K/UL (ref 0–0.04)
IMM GRANULOCYTES NFR BLD AUTO: 0.7 % (ref 0–0.5)
LYMPHOCYTES # BLD: 0.54 K/UL (ref 0.8–3.5)
LYMPHOCYTES NFR BLD: 5.6 % (ref 12–49)
MAGNESIUM SERPL-MCNC: 1.9 MG/DL (ref 1.6–2.4)
MCH RBC QN AUTO: 31.3 PG (ref 26–34)
MCHC RBC AUTO-ENTMCNC: 32.3 G/DL (ref 30–36.5)
MCV RBC AUTO: 96.7 FL (ref 80–99)
MONOCYTES # BLD: 0.64 K/UL (ref 0–1)
MONOCYTES NFR BLD: 6.6 % (ref 5–13)
NEUTS SEG # BLD: 8.44 K/UL (ref 1.8–8)
NEUTS SEG NFR BLD: 87 % (ref 32–75)
NRBC # BLD: 0 K/UL (ref 0–0.01)
NRBC BLD-RTO: 0 PER 100 WBC
PHOSPHATE SERPL-MCNC: 4.4 MG/DL (ref 2.6–4.7)
PLATELET # BLD AUTO: 212 K/UL (ref 150–400)
PMV BLD AUTO: 9.3 FL (ref 8.9–12.9)
POTASSIUM SERPL-SCNC: 4.1 MMOL/L (ref 3.5–5.1)
RBC # BLD AUTO: 5.82 M/UL (ref 4.1–5.7)
RBC MORPH BLD: ABNORMAL
SODIUM SERPL-SCNC: 136 MMOL/L (ref 136–145)
SPECIMEN HOLD: NORMAL
WBC # BLD AUTO: 9.7 K/UL (ref 4.1–11.1)

## 2025-06-10 PROCEDURE — 6370000000 HC RX 637 (ALT 250 FOR IP): Performed by: INTERNAL MEDICINE

## 2025-06-10 PROCEDURE — 85025 COMPLETE CBC W/AUTO DIFF WBC: CPT

## 2025-06-10 PROCEDURE — 2500000003 HC RX 250 WO HCPCS: Performed by: INTERNAL MEDICINE

## 2025-06-10 PROCEDURE — 2500000003 HC RX 250 WO HCPCS: Performed by: STUDENT IN AN ORGANIZED HEALTH CARE EDUCATION/TRAINING PROGRAM

## 2025-06-10 PROCEDURE — 84100 ASSAY OF PHOSPHORUS: CPT

## 2025-06-10 PROCEDURE — 80048 BASIC METABOLIC PNL TOTAL CA: CPT

## 2025-06-10 PROCEDURE — 94618 PULMONARY STRESS TESTING: CPT

## 2025-06-10 PROCEDURE — 83735 ASSAY OF MAGNESIUM: CPT

## 2025-06-10 PROCEDURE — 6360000002 HC RX W HCPCS: Performed by: STUDENT IN AN ORGANIZED HEALTH CARE EDUCATION/TRAINING PROGRAM

## 2025-06-10 PROCEDURE — 6370000000 HC RX 637 (ALT 250 FOR IP): Performed by: STUDENT IN AN ORGANIZED HEALTH CARE EDUCATION/TRAINING PROGRAM

## 2025-06-10 RX ADMIN — CARVEDILOL 6.25 MG: 6.25 TABLET, FILM COATED ORAL at 09:04

## 2025-06-10 RX ADMIN — SPIRONOLACTONE 25 MG: 25 TABLET ORAL at 09:04

## 2025-06-10 RX ADMIN — SACUBITRIL AND VALSARTAN 1 TABLET: 24; 26 TABLET, FILM COATED ORAL at 09:04

## 2025-06-10 RX ADMIN — FUROSEMIDE 20 MG: 40 TABLET ORAL at 09:04

## 2025-06-10 RX ADMIN — SODIUM CHLORIDE, PRESERVATIVE FREE 10 ML: 5 INJECTION INTRAVENOUS at 09:15

## 2025-06-10 RX ADMIN — EMPAGLIFLOZIN 10 MG: 10 TABLET, FILM COATED ORAL at 09:04

## 2025-06-10 RX ADMIN — WATER 40 MG: 1 INJECTION INTRAMUSCULAR; INTRAVENOUS; SUBCUTANEOUS at 09:04

## 2025-06-10 NOTE — PLAN OF CARE
Problem: Respiratory - Adult  Goal: Achieves optimal ventilation and oxygenation  Outcome: Progressing  Flowsheets (Taken 6/9/2025 2145)  Achieves optimal ventilation and oxygenation: Assess for changes in respiratory status     Problem: Chronic Conditions and Co-morbidities  Goal: Patient's chronic conditions and co-morbidity symptoms are monitored and maintained or improved  Outcome: Progressing  Flowsheets (Taken 6/9/2025 2145)  Care Plan - Patient's Chronic Conditions and Co-Morbidity Symptoms are Monitored and Maintained or Improved:   Monitor and assess patient's chronic conditions and comorbid symptoms for stability, deterioration, or improvement   Collaborate with multidisciplinary team to address chronic and comorbid conditions and prevent exacerbation or deterioration   Update acute care plan with appropriate goals if chronic or comorbid symptoms are exacerbated and prevent overall improvement and discharge     Problem: Discharge Planning  Goal: Discharge to home or other facility with appropriate resources  Outcome: Progressing  Flowsheets (Taken 6/9/2025 2145)  Discharge to home or other facility with appropriate resources:   Identify barriers to discharge with patient and caregiver   Arrange for needed discharge resources and transportation as appropriate   Identify discharge learning needs (meds, wound care, etc)   Refer to discharge planning if patient needs post-hospital services based on physician order or complex needs related to functional status, cognitive ability or social support system     Problem: Safety - Adult  Goal: Free from fall injury  Outcome: Progressing

## 2025-06-10 NOTE — DISCHARGE SUMMARY
good  Neuro:  Cranial nerves are grossly intact, no focal motor weakness, follows commands appropriately  Psych:  Good insight, oriented to person, place and time, alert          Disposition: home    Patient Instructions:      Medication List        CONTINUE taking these medications      carvedilol 12.5 MG tablet  Commonly known as: COREG     Entresto 24-26 MG per tablet  Generic drug: sacubitril-valsartan     furosemide 20 MG tablet  Commonly known as: LASIX     Jardiance 10 MG tablet  Generic drug: empagliflozin  TAKE 1 TABLET BY MOUTH EVERY DAY     rosuvastatin 20 MG tablet  Commonly known as: CRESTOR     spironolactone 25 MG tablet  Commonly known as: ALDACTONE  TAKE 1 TABLET BY MOUTH EVERY DAY            Current Discharge Medication List        CONTINUE these medications which have NOT CHANGED    Details   empagliflozin (JARDIANCE) 10 MG tablet TAKE 1 TABLET BY MOUTH EVERY DAY  Qty: 90 tablet, Refills: 2      spironolactone (ALDACTONE) 25 MG tablet TAKE 1 TABLET BY MOUTH EVERY DAY  Qty: 90 tablet, Refills: 3      rosuvastatin (CRESTOR) 20 MG tablet Take 1 tablet by mouth daily      carvedilol (COREG) 12.5 MG tablet Take 1.5 tablets by mouth 2 times daily (with meals)      furosemide (LASIX) 20 MG tablet Take 1 tablet by mouth daily      sacubitril-valsartan (ENTRESTO) 24-26 MG per tablet Take 1 tablet by mouth 2 times daily              Activity: activity as tolerated  Diet: cardiac diet  Wound Care: as directed    Follow-up with Kennedy Bowling MD in 2 weeks.  Follow-up tests/labs : None    Approximate time spent in patient care on day of discharge: 35 minutes    Signed:  Ruben Westfall MD  6/10/2025  9:36 AM

## 2025-06-10 NOTE — PROGRESS NOTES
[facility] CARDIOLOGY                    Cardiology Care Note     [x]Initial Encounter     [x]Follow-up    Patient Name: Fuentes Simons - :1965 - MRN:[id number]  Primary Cardiologist:   Consulting Cardiologist: Liu Barrios MD     Reason for encounter: Dyspnea    HPI:       Fuentes Simons is a 59 y.o. male with PMH significant for severe restrictive lung disease/Klippel Feil syndrome/pulmonary hypertension/RV dysfunction/chronic dyspnea who was admitted for hide acute hypoxic respiratory failure and is currently on BiPAP.  History of cough/dyspnea.    Subjective:      Dyspnea improved; Sitting in chair; ON NC     Assessment and Plan     Hypoxic respiratory failure  History of Klippel-Feil syndrome/restrictive lung disease/severe pulmonary hypertension  Acute on chronic HFpEF( Echo 3/2025 - EF 50%)/RV dysfunction/ Hx of being positive for pathogenic TTN gene  Hypertension  History of PFO     Plan:  On NC now  Patient followed by advanced heart failure clinic and was last seen on 2025  PTA-on Entresto/Aldactone/coreg/Jardiance/Lasix 20 mg bid  On Coreg . As BP improves - restart entresto/aldactone  Transition lasix to PO   Fup [facility] as OP             ____________________________________________________________    Cardiac testing  04/15/25    ECHO (TTE) COMPLETE (PRN CONTRAST/BUBBLE/STRAIN/3D) 04/15/2025 12:45 PM (Final)    Interpretation Summary    Left Ventricle: Low normal left ventricular systolic function. EF by visual approximation is 50%. Left ventricle size is normal. Normal wall thickness. Normal wall motion. Normal diastolic function.    Right Ventricle: Right ventricle size is normal. Normal systolic function.    Image quality is adequate. Procedure performed with the patient in a supine position.    Signed by: Mickey Dietz MD on 4/15/2025 12:45 PM    22    NM STRESS TEST WITH MYOCARDIAL PERFUSION 2016 12:00 AM 
   06/10/25 1022   Resting (Room Air)   SpO2 90      Resting (On O2)   SpO2 95      O2 Device Nasal cannula   O2 Flow Rate (l/min) 2 l/min   During Walk (Room Air)   SpO2 87      Rate of Dyspnea 0   During Walk (On O2)   SpO2 90      O2 Device Nasal cannula   O2 Flow Rate (l/min) 2 l/min   Rate of Dyspnea 0   After Walk   SpO2 90      Rate of Dyspnea 0   Does the Patient Qualify for Home O2 Yes   Liter Flow at Rest 0   Liter Flow on Exertion 2     Patient states he has qualified for home oxygen before.  States he refused to wear it and returned to [employer].  RT explained that patient qualifies for home oxygen but patient expressed he will not wear it.  RT left room with patient taking off nasal cannula.    
7939 patient stated \"I do not understand why I need to have a heart monitor when nothing is wrong with heart, and would prefer to not have a box\". Nurse made MD aware.   
Attempted to give scheduled aerosol treatment.  Patient refused tx stating he did not need treatment.  Patient in no respiratory distress sitting up in chair on nasal cannula @ 2l/m.  Spoke with MD who is in agreement to make aerosol treatments PRN.  
OCCUPATIONAL THERAPY EVALUATION/DISCHARGE  Patient: Fuentes Simons (59 y.o. male)  Date: 6/9/2025  Primary Diagnosis: Hypoxemia [R09.02]  Dyspnea and respiratory abnormalities [R06.00, R06.89]  Elevated brain natriuretic peptide (BNP) level [R79.89]  Acute respiratory failure with hypoxia (HCC) [J96.01]  Acute respiratory failure with hypoxia and hypercapnia (HCC) [J96.01, J96.02]  Acute on chronic congestive heart failure, unspecified heart failure type (HCC) [I50.9]         Precautions: Fall Risk (Droplet, Contact)                  ASSESSMENT :  Patient presents for OT evaluation A&Ox4 and at independent functional baseline except for impaired respiratory tolerance.  He demonstrated WFL AROM/ strength/ coordination for ADLs and ambulated to/ from bathroom without AD for toileting and grooming- all independently.   He was received on 2L/min O2 NC and SPO2 desaturated to 83% with this level of activity, then recovered >90% within ~1 min seated rest.  He denied any respiratory distress but was educated on breathing techniques/ energy conservation techniques if needed.  Patient's wife present and supportive. Patient agreed he does not need any further OT.  Recommend return home at d/c.    Functional Outcome Measure:  The patient scored 0% on the AM-PAC ADL outcome measure which is indicative of no ADL impairment.      Further skilled acute occupational therapy is not indicated at this time.     PLAN :    Recommendation for discharge: (in order for the patient to meet his/her long term goals):   No skilled occupational therapy       SUBJECTIVE:   Patient stated, “I'm fine.”    OBJECTIVE DATA SUMMARY:     Past Medical History:   Diagnosis Date    CHF (congestive heart failure) (HCC)     Heart failure (HCC)     Hypertension     Klippel-Feil syndrome     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    Pulmonary hypertension (HCC)     Restrictive lung disease     Right ventricular dysfunction     Scoliosis  
PHYSICAL THERAPY EVALUATION/DISCHARGE    Patient: Fuentes Simons (59 y.o. male)  Date: 6/9/2025  Primary Diagnosis: Hypoxemia [R09.02]  Dyspnea and respiratory abnormalities [R06.00, R06.89]  Elevated brain natriuretic peptide (BNP) level [R79.89]  Acute respiratory failure with hypoxia (HCC) [J96.01]  Acute respiratory failure with hypoxia and hypercapnia (HCC) [J96.01, J96.02]  Acute on chronic congestive heart failure, unspecified heart failure type (HCC) [I50.9]       Precautions: Restrictions/Precautions  Restrictions/Precautions: Fall Risk (Droplet, Contact)            ASSESSMENT AND RECOMMENDATIONS:  Based on the objective data below, the patient was admitted on 6/7 with hypoxia and lethargy. Pt initially placed on BiPAP and transitioned to 2L. Pt typically does not wear home oxygen. Pt's mobility and activity tolerance assessed on room air. Pt mobilized at independent level for functional transfers and gait training without AD with no LOB or instability noted. SpO2 desaturated to 87% on room air with good pleath reading. Re-applied 2L of oxygen and spO2: 92-93%. Pt educated on importance of remaining OOB and up with RN staff to prevent further deconditioning during remaining hospital stay.    Patient will be discharged from PT services. Pt is currently mobilizing with independence using no assistive device. If a 6MWT test/oxygen challenge test is indicated, please follow up with nursing and/or RT.     Functional Outcome Measure:  The patient scored 24/24 on the [facility] mobility outcome measure which is indicative of not needing additional therapy.      Further skilled acute physical therapy is not indicated at this time.       PLAN :  Recommendation for discharge: (in order for the patient to meet his/her long term goals):   No skilled physical therapy    Other factors to consider for discharge: no additional factors    IF patient discharges home will need the following DME: none       SUBJECTIVE:   Patient 
Pts BP was 98/72 this morning. Dr. Miranda was contacted about holding medication due to BP being low. The PT stated that they take spironolactone, Furosamide and carvidol at home and that it was fine, he wanted to take the medication.  said that we should hold due to BP but PT insisted on receiving medication. Medication was given to PT.   
TRANSFER - OUT REPORT:    Verbal report given to Ashley ALEXIS on Fuentes Simons  being transferred to [facility] for routine progression of patient care       Report consisted of patient's Situation, Background, Assessment and   Recommendations(SBAR).     Information from the following report(s) Nurse Handoff Report, Index, Adult Overview, Surgery Report, Intake/Output, MAR, Recent Results, Med Rec Status, Cardiac Rhythm SR, Alarm Parameters, Quality Measures, Neuro Assessment, and Event Log was reviewed with the receiving nurse.    Lines:   Peripheral IV 06/07/25 Distal;Left;Anterior Forearm (Active)   Site Assessment Clean, dry & intact 06/09/25 1110   Line Status Flushed;Capped;Brisk blood return 06/09/25 1110   Line Care Connections checked and tightened 06/09/25 1110   Phlebitis Assessment No symptoms 06/09/25 1110   Infiltration Assessment 0 06/09/25 1110   Alcohol Cap Used Yes 06/09/25 1110   Dressing Status Clean, dry & intact 06/09/25 1110   Dressing Type Transparent 06/09/25 1110       Peripheral IV 06/07/25 Left Antecubital (Active)   Site Assessment Clean, dry & intact 06/09/25 1110   Line Status Flushed;Capped;No blood return 06/09/25 1110   Line Care Connections checked and tightened 06/09/25 1110   Phlebitis Assessment No symptoms 06/09/25 1110   Infiltration Assessment 0 06/09/25 1110   Alcohol Cap Used Yes 06/09/25 1110   Dressing Status Clean, dry & intact 06/09/25 1110   Dressing Type Transparent 06/09/25 1110        Opportunity for questions and clarification was provided.      Patient transported with:  O2 @ 2lpm and Registered Nurse  
(36.6 °C), Min:96.2 °F (35.7 °C), Max:98.9 °F (37.2 °C)           Intake/Output:     Intake/Output Summary (Last 24 hours) at 6/8/2025 0845  Last data filed at 6/7/2025 2210  Gross per 24 hour   Intake 250 ml   Output 1400 ml   Net -1150 ml         Physical Exam  Gen: Not in distress  HEENT: NC/AT, supple  Cardiac: Regular rate and rhythm, no murmurs  Pulm: Clear to auscultation bilaterally  ABD: Soft, non distended, non tender, normal bowel sounds  Extremities: no significant edema  Neuro: A/O X 3, no focal deficits     LABS AND  DATA:   Reviewed      Peak airway pressure:      Minute ventilation:        CRITICAL CARE CONSULTANT NOTE  I had a face to face encounter with the patient, reviewed and interpreted patient data including clinical events, labs, images, vital signs, I/O's, and examined patient.  I have discussed the case and the plan and management of the patient's care with the consulting services, the bedside nurses and the respiratory therapist.      NOTE OF PERSONAL INVOLVEMENT IN CARE   This patient has a high probability of imminent, clinically significant deterioration, which requires the highest level of preparedness to intervene urgently. I participated in the decision-making and personally managed or directed the management of the following life and organ supporting interventions that required my frequent assessment to treat or prevent imminent deterioration.      Brittany Fernandez MD   Critical Care Medicine  [facility]      
at 6/9/2025 0914  Last data filed at 6/9/2025 0630  Gross per 24 hour   Intake --   Output 1700 ml   Net -1700 ml         Physical Exam  Gen: Not in distress  HEENT: NC/AT, supple  Cardiac: Regular rate and rhythm, no murmurs  Pulm: Clear to auscultation bilaterally  ABD: Soft, non distended, non tender, normal bowel sounds  Extremities: no significant edema  Neuro: A/O X 3, no focal deficits     LABS AND  DATA:   Reviewed      Peak airway pressure:      Minute ventilation:        CRITICAL CARE CONSULTANT NOTE  I had a face to face encounter with the patient, reviewed and interpreted patient data including clinical events, labs, images, vital signs, I/O's, and examined patient.  I have discussed the case and the plan and management of the patient's care with the consulting services, the bedside nurses and the respiratory therapist.      NOTE OF PERSONAL INVOLVEMENT IN CARE   This patient has a high probability of imminent, clinically significant deterioration, which requires the highest level of preparedness to intervene urgently. I participated in the decision-making and personally managed or directed the management of the following life and organ supporting interventions that required my frequent assessment to treat or prevent imminent deterioration.      Maddison Howe  Critical Care Medicine  [facility]

## 2025-06-10 NOTE — CARE COORDINATION
6/10/2025  11:23 AM  Care Management Progress Note    Reason for Admission:   Hypoxemia [R09.02]  Dyspnea and respiratory abnormalities [R06.00, R06.89]  Elevated brain natriuretic peptide (BNP) level [R79.89]  Acute respiratory failure with hypoxia (HCC) [J96.01]  Acute respiratory failure with hypoxia and hypercapnia (HCC) [J96.01, J96.02]  Acute on chronic congestive heart failure, unspecified heart failure type (HCC) [I50.9]         Patient Admission Status: Inpatient  RUR: 10%   Hospitalization in the last 30 days (Readmission):  No        Transition of care plan:  Pt discussed in IDR, medically stable for DC, awaiting oximetry to eval for Home O2  RT performed oximetry pt dropped to 87% on RA, qualifies for Home O2 @ 2 LPM, per RT note, pt will not wear Home O2; nursing has notified MD Westfall   Discharge plan communicated with patient and/or discharge caregiver: No    Outpatient follow-up.  Transport at discharge: Self   RONNIE Gonzalez

## 2025-06-12 LAB
BACTERIA SPEC CULT: NORMAL
BACTERIA SPEC CULT: NORMAL
SERVICE CMNT-IMP: NORMAL
SERVICE CMNT-IMP: NORMAL

## 2025-07-02 RX ORDER — SPIRONOLACTONE 25 MG/1
25 TABLET ORAL DAILY
Qty: 90 TABLET | Refills: 2 | Status: SHIPPED | OUTPATIENT
Start: 2025-07-02

## 2025-07-02 NOTE — TELEPHONE ENCOUNTER
Requested Prescriptions     Pending Prescriptions Disp Refills    spironolactone (ALDACTONE) 25 MG tablet [Pharmacy Med Name: SPIRONOLACTONE 25 MG TABLET] 90 tablet 2     Sig: TAKE 1 TABLET BY MOUTH EVERY DAY      Last appt 4/24/25  Next appt TBD April 2026 (1 year)

## 2025-07-17 ENCOUNTER — TELEPHONE (OUTPATIENT)
Age: 60
End: 2025-07-17

## 2025-07-17 NOTE — TELEPHONE ENCOUNTER
Pt reached out through [employer] requesting an appt b/c he's experiencing weight gain & fluid retention.  I wrote him back through [employer] that I was routing a note to his nurses so they could evaluate his symptoms.  I don't have any availability currently.

## 2025-07-31 ENCOUNTER — PATIENT MESSAGE (OUTPATIENT)
Age: 60
End: 2025-07-31

## 2025-07-31 DIAGNOSIS — I50.32 CHRONIC DIASTOLIC HEART FAILURE (HCC): Primary | ICD-10-CM

## 2025-07-31 DIAGNOSIS — Z79.899 ENCOUNTER FOR MONITORING DIURETIC THERAPY: ICD-10-CM

## 2025-07-31 DIAGNOSIS — Z51.81 ENCOUNTER FOR MONITORING DIURETIC THERAPY: ICD-10-CM

## 2025-08-01 DIAGNOSIS — Z79.899 ENCOUNTER FOR MONITORING DIURETIC THERAPY: ICD-10-CM

## 2025-08-01 DIAGNOSIS — I50.32 CHRONIC DIASTOLIC HEART FAILURE (HCC): ICD-10-CM

## 2025-08-01 DIAGNOSIS — Z51.81 ENCOUNTER FOR MONITORING DIURETIC THERAPY: ICD-10-CM

## 2025-08-02 LAB
ALBUMIN SERPL-MCNC: 4.5 G/DL (ref 3.8–4.9)
ALP SERPL-CCNC: 84 IU/L (ref 44–121)
ALT SERPL-CCNC: 25 IU/L (ref 0–44)
AST SERPL-CCNC: 22 IU/L (ref 0–40)
BILIRUB SERPL-MCNC: 0.5 MG/DL (ref 0–1.2)
BUN SERPL-MCNC: 18 MG/DL (ref 6–24)
BUN/CREAT SERPL: 18 (ref 9–20)
CALCIUM SERPL-MCNC: 9.3 MG/DL (ref 8.7–10.2)
CHLORIDE SERPL-SCNC: 94 MMOL/L (ref 96–106)
CO2 SERPL-SCNC: 25 MMOL/L (ref 20–29)
CREAT SERPL-MCNC: 1.02 MG/DL (ref 0.76–1.27)
EGFRCR SERPLBLD CKD-EPI 2021: 85 ML/MIN/1.73
GLOBULIN SER CALC-MCNC: 2.5 G/DL (ref 1.5–4.5)
GLUCOSE SERPL-MCNC: 121 MG/DL (ref 70–99)
NT-PROBNP SERPL-MCNC: 1296 PG/ML (ref 0–210)
POTASSIUM SERPL-SCNC: 4 MMOL/L (ref 3.5–5.2)
PROT SERPL-MCNC: 7 G/DL (ref 6–8.5)
SODIUM SERPL-SCNC: 145 MMOL/L (ref 134–144)

## 2025-08-06 ENCOUNTER — OFFICE VISIT (OUTPATIENT)
Age: 60
End: 2025-08-06
Payer: COMMERCIAL

## 2025-08-06 VITALS
HEART RATE: 87 BPM | SYSTOLIC BLOOD PRESSURE: 96 MMHG | DIASTOLIC BLOOD PRESSURE: 72 MMHG | WEIGHT: 154 LBS | OXYGEN SATURATION: 93 % | RESPIRATION RATE: 18 BRPM | BODY MASS INDEX: 24.17 KG/M2 | HEIGHT: 67 IN

## 2025-08-06 DIAGNOSIS — I50.812 CHRONIC RIGHT HEART FAILURE (HCC): ICD-10-CM

## 2025-08-06 DIAGNOSIS — I50.32 CHRONIC DIASTOLIC HEART FAILURE (HCC): Primary | ICD-10-CM

## 2025-08-06 PROCEDURE — 96374 THER/PROPH/DIAG INJ IV PUSH: CPT | Performed by: NURSE PRACTITIONER

## 2025-08-06 PROCEDURE — 3078F DIAST BP <80 MM HG: CPT | Performed by: NURSE PRACTITIONER

## 2025-08-06 PROCEDURE — 99215 OFFICE O/P EST HI 40 MIN: CPT | Performed by: NURSE PRACTITIONER

## 2025-08-06 PROCEDURE — 3074F SYST BP LT 130 MM HG: CPT | Performed by: NURSE PRACTITIONER

## 2025-08-06 RX ORDER — SODIUM CHLORIDE 0.9 % (FLUSH) 0.9 %
5-40 SYRINGE (ML) INJECTION ONCE
Status: COMPLETED | OUTPATIENT
Start: 2025-08-06 | End: 2025-08-06

## 2025-08-06 RX ORDER — FUROSEMIDE 40 MG/1
40 TABLET ORAL 2 TIMES DAILY
Qty: 60 TABLET | Refills: 3 | Status: SHIPPED | OUTPATIENT
Start: 2025-08-06

## 2025-08-06 RX ORDER — POTASSIUM CHLORIDE 1500 MG/1
20 TABLET, EXTENDED RELEASE ORAL ONCE
Status: COMPLETED | OUTPATIENT
Start: 2025-08-06 | End: 2025-08-06

## 2025-08-06 RX ORDER — POTASSIUM CHLORIDE 1500 MG/1
20 TABLET, EXTENDED RELEASE ORAL ONCE
Status: SHIPPED | OUTPATIENT
Start: 2025-08-06

## 2025-08-06 RX ORDER — EMPAGLIFLOZIN 25 MG/1
25 TABLET, FILM COATED ORAL DAILY
COMMUNITY
Start: 2025-07-01 | End: 2025-08-06 | Stop reason: SDUPTHER

## 2025-08-06 RX ORDER — BUMETANIDE 0.25 MG/ML
1 INJECTION, SOLUTION INTRAMUSCULAR; INTRAVENOUS ONCE
Status: COMPLETED | OUTPATIENT
Start: 2025-08-06 | End: 2025-08-06

## 2025-08-06 RX ADMIN — POTASSIUM CHLORIDE 20 MEQ: 1500 TABLET, EXTENDED RELEASE ORAL at 14:15

## 2025-08-06 RX ADMIN — BUMETANIDE 1 MG: 0.25 INJECTION, SOLUTION INTRAMUSCULAR; INTRAVENOUS at 14:11

## 2025-08-06 RX ADMIN — Medication 10 ML: at 14:16

## 2025-08-06 ASSESSMENT — ENCOUNTER SYMPTOMS
SORE THROAT: 0
COUGH: 0
EYE PAIN: 0
ABDOMINAL DISTENTION: 1
ABDOMINAL PAIN: 0
SHORTNESS OF BREATH: 1
CHEST TIGHTNESS: 1
BLOOD IN STOOL: 0
CONSTIPATION: 0

## 2025-08-06 ASSESSMENT — PATIENT HEALTH QUESTIONNAIRE - PHQ9
1. LITTLE INTEREST OR PLEASURE IN DOING THINGS: NOT AT ALL
SUM OF ALL RESPONSES TO PHQ QUESTIONS 1-9: 0
2. FEELING DOWN, DEPRESSED OR HOPELESS: NOT AT ALL
SUM OF ALL RESPONSES TO PHQ QUESTIONS 1-9: 0

## 2025-08-11 RX ORDER — METOLAZONE 2.5 MG/1
2.5 TABLET ORAL PRN
Qty: 3 TABLET | Refills: 0 | Status: SHIPPED | OUTPATIENT
Start: 2025-08-11

## 2025-08-11 RX ORDER — POTASSIUM CHLORIDE 1500 MG/1
40 TABLET, EXTENDED RELEASE ORAL PRN
Qty: 120 TABLET | Refills: 0 | Status: SHIPPED | OUTPATIENT
Start: 2025-08-11

## (undated) DEVICE — MICROPUNCTURE INTRODUCER SET SILHOUETTE TRANSITIONLESS WITH STAINLESS STEEL WIRE GUIDE: Brand: MICROPUNCTURE

## (undated) DEVICE — INTRODUCER SHTH 5 FRX30 CM 7 CM W/ NIT WIRE REG MICRO-STICK

## (undated) DEVICE — TUBING PRSS MON L12IN PVC RIG NONEXPANDING M TO FEM CONN

## (undated) DEVICE — HEART CATH-SFMC: Brand: MEDLINE INDUSTRIES, INC.

## (undated) DEVICE — DRESSING HEMOSTATIC INTVENT W/O SLT QUIKCLOT

## (undated) DEVICE — PINNACLE R/O II INTRODUCER SHEATH WITH RADIOPAQUE MARKER: Brand: PINNACLE

## (undated) DEVICE — DRESSING HEMOSTATIC SFT INTVENT W/O SLT DBL WRP QUIKCLOT LF

## (undated) DEVICE — SWAN-GANZ HI-SHORE TRUE SIZE THERMODILUTION CATHETER: Brand: SWAN-GANZ HI-SHORE TRUE SIZE

## (undated) DEVICE — PINNACLE INTRODUCER SHEATH: Brand: PINNACLE